# Patient Record
Sex: FEMALE | Race: WHITE | NOT HISPANIC OR LATINO | Employment: FULL TIME | URBAN - METROPOLITAN AREA
[De-identification: names, ages, dates, MRNs, and addresses within clinical notes are randomized per-mention and may not be internally consistent; named-entity substitution may affect disease eponyms.]

---

## 2023-09-20 ENCOUNTER — OFFICE VISIT (OUTPATIENT)
Dept: PRIMARY CARE | Facility: CLINIC | Age: 38
End: 2023-09-20
Payer: COMMERCIAL

## 2023-09-20 VITALS
WEIGHT: 113 LBS | TEMPERATURE: 97.8 F | SYSTOLIC BLOOD PRESSURE: 129 MMHG | HEART RATE: 91 BPM | HEIGHT: 60 IN | DIASTOLIC BLOOD PRESSURE: 79 MMHG | BODY MASS INDEX: 22.19 KG/M2 | OXYGEN SATURATION: 97 %

## 2023-09-20 DIAGNOSIS — F41.9 ANXIETY: ICD-10-CM

## 2023-09-20 DIAGNOSIS — M25.551 PAIN OF RIGHT HIP: ICD-10-CM

## 2023-09-20 DIAGNOSIS — R61 NIGHT SWEATS: ICD-10-CM

## 2023-09-20 DIAGNOSIS — Z00.00 HEALTHCARE MAINTENANCE: Primary | ICD-10-CM

## 2023-09-20 PROBLEM — M81.8 OSTEOPOROSIS, IDIOPATHIC: Status: ACTIVE | Noted: 2023-09-20

## 2023-09-20 PROBLEM — R79.89 ABNORMAL LH LEVEL: Status: RESOLVED | Noted: 2023-09-20 | Resolved: 2023-09-20

## 2023-09-20 PROBLEM — M62.40 MUSCLE CONTRACTURE: Status: RESOLVED | Noted: 2023-09-20 | Resolved: 2023-09-20

## 2023-09-20 PROBLEM — M84.352A: Status: RESOLVED | Noted: 2023-09-20 | Resolved: 2023-09-20

## 2023-09-20 PROBLEM — R79.89 ABNORMAL FSH LEVEL: Status: RESOLVED | Noted: 2023-09-20 | Resolved: 2023-09-20

## 2023-09-20 PROCEDURE — 99395 PREV VISIT EST AGE 18-39: CPT | Performed by: STUDENT IN AN ORGANIZED HEALTH CARE EDUCATION/TRAINING PROGRAM

## 2023-09-20 PROCEDURE — 90471 IMMUNIZATION ADMIN: CPT | Performed by: STUDENT IN AN ORGANIZED HEALTH CARE EDUCATION/TRAINING PROGRAM

## 2023-09-20 PROCEDURE — 90686 IIV4 VACC NO PRSV 0.5 ML IM: CPT | Performed by: STUDENT IN AN ORGANIZED HEALTH CARE EDUCATION/TRAINING PROGRAM

## 2023-09-20 PROCEDURE — 1036F TOBACCO NON-USER: CPT | Performed by: STUDENT IN AN ORGANIZED HEALTH CARE EDUCATION/TRAINING PROGRAM

## 2023-09-20 RX ORDER — PSYLLIUM HUSK 0.4 G
CAPSULE ORAL
COMMUNITY

## 2023-09-20 RX ORDER — CHOLECALCIFEROL (VITAMIN D3) 50 MCG
1 TABLET ORAL DAILY
COMMUNITY
Start: 2022-08-02

## 2023-09-20 RX ORDER — ABALOPARATIDE 2000 UG/ML
INJECTION, SOLUTION SUBCUTANEOUS
COMMUNITY
Start: 2022-06-24 | End: 2023-11-27

## 2023-09-20 NOTE — PROGRESS NOTES
Subjective   Patient ID: Maribel Amador is a 37 y.o. female who presents for No chief complaint on file..    HPI   PMhx CVA (some residual R sided weakness) presenting for CPE.     Re: Osteoporosis - see rheum notes. On Tymlos now for her history of fractures. Doing well on this medication. Having some pain in her surgically repaired R hip. More stiff than usual. No trauma recently in this area, but she wants to make sure the hardware is OK because despite coservative mgmt (PT, NSAIDs PRN) she's still having discomfort in the area.    Re: Anxiety / Night sweats - not sleeping very well lately. Having some sweats and a bit of anxiety that keeps her up and prevents her from sleeping well. She wants to go to a holistic approach and would prefer to avoid SSRI/SNRI or MAOi to help. Amenable to [facility] referral.    Re: HM - due for flu shot. Lab work due. CRS and Mammogram not yet due.     Review of Systems    Objective   /79   Pulse 91   Temp 36.6 °C (97.8 °F)   Ht 1.524 m (5')   Wt 51.3 kg (113 lb)   SpO2 97%   BMI 22.07 kg/m²     Physical Exam  Gen: well developed in NAD. AAO x3.  HEENT: NC/AT. Anicteric sclera, symmetric pupils. MMM no thrush.  Neck: Soft, supple. No LAD. No goiter.   CV: RRR nl s1s2 no m/r/g  Pulm: CTAB no w/r/r, good air exchange  GI: soft NTND BS+ no hsm  Ext: WWP no edema  Neuro: II-XII grossly intact, brisker reflexes R side compared to L.   MSK: 5/5 LLE. 4/5 RLE, no tenderness of hip on palpation. Well healed surgical scars bilaterally  Gait: normal    Lab Results   Component Value Date    WBC 6.3 05/16/2022    HGB 14.2 05/16/2022    HCT 44.4 05/16/2022     05/16/2022    CHOL 156 05/16/2022    TRIG 72 05/16/2022    HDL 65.0 05/16/2022    ALT 14 08/25/2022    AST 16 08/25/2022     12/31/2022    K 4.7 12/31/2022     12/31/2022    CREATININE 0.93 12/31/2022    BUN 17 12/31/2022    CO2 26 12/31/2022    TSH 1.12 05/16/2022     par    XR PELVIS 1-2 VIEWS  Narrative:  Interpreted By:  TANK STAFFORD MD  MRN: [id number]  Patient Name: ANNA AGUILA     STUDY:  Pelvis, single AP view.  Bilateral femora, two views each.     INDICATION:  standing  M76.899: Hip flexor tendinitis; Stress fracture of neck of  left femur; Stress fracture of neck of right femur  M84.351A: Stress  fracture of neck of right femur M84.352A: Stress fracture of neck of  left femur.     COMPARISON:  07/06/2022.     ACCESSION NUMBER(S):  [id number]; [id number]     ORDERING CLINICIAN:  SAMI CLAIRE     FINDINGS:  No acute fracture or malalignment.  Intramedullary nail fixation changes are noted of the bilateral  femora for atypical fractures.  Hardware is intact without perihardware fractures or lucencies.  Bilateral hip and knee joints are unremarkable with well preserved  joint spaces.  Soft tissues are within normal limits.     Impression: Intramedullary nail fixation of bilateral femora without hardware  complication.     XR FEMUR 2 VW BILATERAL  Narrative: Interpreted By:  TANK STAFFORD MD  MRN: [id number]  Patient Name: ANNA AGUILA     STUDY:  Pelvis, single AP view.  Bilateral femora, two views each.     INDICATION:  standing  M76.899: Hip flexor tendinitis; Stress fracture of neck of  left femur; Stress fracture of neck of right femur  M84.351A: Stress  fracture of neck of right femur M84.352A: Stress fracture of neck of  left femur.     COMPARISON:  07/06/2022.     ACCESSION NUMBER(S):  [id number]; [id number]     ORDERING CLINICIAN:  SAMI CLAIRE     FINDINGS:  No acute fracture or malalignment.  Intramedullary nail fixation changes are noted of the bilateral  femora for atypical fractures.  Hardware is intact without perihardware fractures or lucencies.  Bilateral hip and knee joints are unremarkable with well preserved  joint spaces.  Soft tissues are within normal limits.     Impression: Intramedullary nail fixation of bilateral femora without hardware  complication.            Assessment/Plan    # h/o CVA: monitor     # Osteoporosis  - continue Tymlos  - follow up rheum    # Hip pain  - x-rays reasonable given her hardware and increased pain despite conservative tx    # Anxiety, Sleep issues  - [facility] referral      # Health Maintenance  - routine blood work  - Colon Cancer Screening: Not yet indicated   - Mammogram: Not yet indicated   - DEXA: current  - Immunizations: flu shot

## 2023-09-20 NOTE — PATIENT INSTRUCTIONS
Please stop at the lab ([address]) to complete your blood and/or urine work that I've ordered for you.    I will contact you with the results at my soonest convenience. I strongly urge you to use [employer] as this is the quickest and easiest way to access your results and recieve my correspondences.     Stop by the radiology department on the first floor of the building for your x-rays    I have referred you to the [facility] for your Wellness referral. Please call them at [phone] to schedule your appointment.     You received your flu shot today!     Further recommendations will be made based on test results and how you fare clinically.

## 2023-09-27 ENCOUNTER — LAB (OUTPATIENT)
Dept: LAB | Facility: LAB | Age: 38
End: 2023-09-27
Payer: COMMERCIAL

## 2023-09-27 DIAGNOSIS — Z00.00 HEALTHCARE MAINTENANCE: ICD-10-CM

## 2023-09-27 LAB
ALANINE AMINOTRANSFERASE (SGPT) (U/L) IN SER/PLAS: 14 U/L (ref 7–45)
ALBUMIN (G/DL) IN SER/PLAS: 4.5 G/DL (ref 3.4–5)
ALKALINE PHOSPHATASE (U/L) IN SER/PLAS: 77 U/L (ref 33–110)
ANION GAP IN SER/PLAS: 17 MMOL/L (ref 10–20)
ASPARTATE AMINOTRANSFERASE (SGOT) (U/L) IN SER/PLAS: 16 U/L (ref 9–39)
BASOPHILS (10*3/UL) IN BLOOD BY AUTOMATED COUNT: 0.02 X10E9/L (ref 0–0.1)
BASOPHILS/100 LEUKOCYTES IN BLOOD BY AUTOMATED COUNT: 0.3 % (ref 0–2)
BILIRUBIN TOTAL (MG/DL) IN SER/PLAS: 0.6 MG/DL (ref 0–1.2)
CALCIDIOL (25 OH VITAMIN D3) (NG/ML) IN SER/PLAS: 35 NG/ML
CALCIUM (MG/DL) IN SER/PLAS: 10.2 MG/DL (ref 8.6–10.6)
CARBON DIOXIDE, TOTAL (MMOL/L) IN SER/PLAS: 25 MMOL/L (ref 21–32)
CHLORIDE (MMOL/L) IN SER/PLAS: 102 MMOL/L (ref 98–107)
CHOLESTEROL (MG/DL) IN SER/PLAS: 200 MG/DL (ref 0–199)
CHOLESTEROL IN HDL (MG/DL) IN SER/PLAS: 60.4 MG/DL
CHOLESTEROL/HDL RATIO: 3.3
CREATININE (MG/DL) IN SER/PLAS: 0.79 MG/DL (ref 0.5–1.05)
EOSINOPHILS (10*3/UL) IN BLOOD BY AUTOMATED COUNT: 0.2 X10E9/L (ref 0–0.7)
EOSINOPHILS/100 LEUKOCYTES IN BLOOD BY AUTOMATED COUNT: 2.8 % (ref 0–6)
ERYTHROCYTE DISTRIBUTION WIDTH (RATIO) BY AUTOMATED COUNT: 12.4 % (ref 11.5–14.5)
ERYTHROCYTE MEAN CORPUSCULAR HEMOGLOBIN CONCENTRATION (G/DL) BY AUTOMATED: 32.3 G/DL (ref 32–36)
ERYTHROCYTE MEAN CORPUSCULAR VOLUME (FL) BY AUTOMATED COUNT: 91 FL (ref 80–100)
ERYTHROCYTES (10*6/UL) IN BLOOD BY AUTOMATED COUNT: 5.19 X10E12/L (ref 4–5.2)
GFR FEMALE: >90 ML/MIN/1.73M2
GLUCOSE (MG/DL) IN SER/PLAS: 86 MG/DL (ref 74–99)
HEMATOCRIT (%) IN BLOOD BY AUTOMATED COUNT: 47.4 % (ref 36–46)
HEMOGLOBIN (G/DL) IN BLOOD: 15.3 G/DL (ref 12–16)
IMMATURE GRANULOCYTES/100 LEUKOCYTES IN BLOOD BY AUTOMATED COUNT: 0.3 % (ref 0–0.9)
LDL: 114 MG/DL (ref 0–99)
LEUKOCYTES (10*3/UL) IN BLOOD BY AUTOMATED COUNT: 7.1 X10E9/L (ref 4.4–11.3)
LYMPHOCYTES (10*3/UL) IN BLOOD BY AUTOMATED COUNT: 1.98 X10E9/L (ref 1.2–4.8)
LYMPHOCYTES/100 LEUKOCYTES IN BLOOD BY AUTOMATED COUNT: 28 % (ref 13–44)
MONOCYTES (10*3/UL) IN BLOOD BY AUTOMATED COUNT: 0.4 X10E9/L (ref 0.1–1)
MONOCYTES/100 LEUKOCYTES IN BLOOD BY AUTOMATED COUNT: 5.7 % (ref 2–10)
NEUTROPHILS (10*3/UL) IN BLOOD BY AUTOMATED COUNT: 4.44 X10E9/L (ref 1.2–7.7)
NEUTROPHILS/100 LEUKOCYTES IN BLOOD BY AUTOMATED COUNT: 62.9 % (ref 40–80)
NRBC (PER 100 WBCS) BY AUTOMATED COUNT: 0 /100 WBC (ref 0–0)
PLATELETS (10*3/UL) IN BLOOD AUTOMATED COUNT: 270 X10E9/L (ref 150–450)
POTASSIUM (MMOL/L) IN SER/PLAS: 4.1 MMOL/L (ref 3.5–5.3)
PROTEIN TOTAL: 7.5 G/DL (ref 6.4–8.2)
SODIUM (MMOL/L) IN SER/PLAS: 140 MMOL/L (ref 136–145)
TRIGLYCERIDE (MG/DL) IN SER/PLAS: 127 MG/DL (ref 0–149)
UREA NITROGEN (MG/DL) IN SER/PLAS: 15 MG/DL (ref 6–23)
VLDL: 25 MG/DL (ref 0–40)

## 2023-09-27 PROCEDURE — 36415 COLL VENOUS BLD VENIPUNCTURE: CPT

## 2023-09-27 PROCEDURE — 82306 VITAMIN D 25 HYDROXY: CPT

## 2023-09-27 PROCEDURE — 80061 LIPID PANEL: CPT

## 2023-09-27 PROCEDURE — 85025 COMPLETE CBC W/AUTO DIFF WBC: CPT

## 2023-09-27 PROCEDURE — 80053 COMPREHEN METABOLIC PANEL: CPT

## 2023-10-03 ENCOUNTER — SPECIALTY PHARMACY (OUTPATIENT)
Dept: PHARMACY | Facility: CLINIC | Age: 38
End: 2023-10-03

## 2023-10-13 ENCOUNTER — HOSPITAL ENCOUNTER (OUTPATIENT)
Dept: CARDIOLOGY | Facility: HOSPITAL | Age: 38
Discharge: HOME | End: 2023-10-13
Payer: COMMERCIAL

## 2023-10-13 DIAGNOSIS — D68.59 OTHER PRIMARY THROMBOPHILIA (MULTI): ICD-10-CM

## 2023-10-13 DIAGNOSIS — Z01.89 ENCOUNTER FOR OTHER SPECIFIED SPECIAL EXAMINATIONS: ICD-10-CM

## 2023-10-13 LAB — EJECTION FRACTION APICAL 4 CHAMBER: 58.2

## 2023-10-13 PROCEDURE — 93306 TTE W/DOPPLER COMPLETE: CPT

## 2023-10-13 PROCEDURE — 93306 TTE W/DOPPLER COMPLETE: CPT | Performed by: INTERNAL MEDICINE

## 2023-10-20 ENCOUNTER — SPECIALTY PHARMACY (OUTPATIENT)
Dept: PHARMACY | Facility: CLINIC | Age: 38
End: 2023-10-20

## 2023-10-20 DIAGNOSIS — M80.00XD OSTEOPOROSIS WITH CURRENT PATHOLOGICAL FRACTURE WITH ROUTINE HEALING, UNSPECIFIED OSTEOPOROSIS TYPE, SUBSEQUENT ENCOUNTER: Primary | ICD-10-CM

## 2023-10-23 RX ORDER — ABALOPARATIDE 2000 UG/ML
INJECTION, SOLUTION SUBCUTANEOUS
Qty: 1.56 ML | Refills: 0 | Status: SHIPPED | OUTPATIENT
Start: 2023-10-23 | End: 2023-10-30 | Stop reason: SDUPTHER

## 2023-10-25 ENCOUNTER — PHARMACY VISIT (OUTPATIENT)
Dept: PHARMACY | Facility: CLINIC | Age: 38
End: 2023-10-25
Payer: COMMERCIAL

## 2023-10-25 PROCEDURE — RXMED WILLOW AMBULATORY MEDICATION CHARGE

## 2023-10-30 ENCOUNTER — PHARMACY VISIT (OUTPATIENT)
Dept: PHARMACY | Facility: CLINIC | Age: 38
End: 2023-10-30
Payer: COMMERCIAL

## 2023-10-30 ENCOUNTER — SPECIALTY PHARMACY (OUTPATIENT)
Dept: PHARMACY | Facility: CLINIC | Age: 38
End: 2023-10-30

## 2023-10-30 DIAGNOSIS — M80.00XD OSTEOPOROSIS WITH CURRENT PATHOLOGICAL FRACTURE WITH ROUTINE HEALING, UNSPECIFIED OSTEOPOROSIS TYPE, SUBSEQUENT ENCOUNTER: ICD-10-CM

## 2023-10-30 PROCEDURE — RXMED WILLOW AMBULATORY MEDICATION CHARGE

## 2023-10-30 RX ORDER — ABALOPARATIDE 2000 UG/ML
INJECTION, SOLUTION SUBCUTANEOUS
Qty: 1.56 ML | Refills: 0 | Status: CANCELLED | OUTPATIENT
Start: 2023-10-30 | End: 2024-10-29

## 2023-10-30 RX ORDER — ABALOPARATIDE 2000 UG/ML
INJECTION, SOLUTION SUBCUTANEOUS
Qty: 1.56 ML | Refills: 0 | Status: SHIPPED | OUTPATIENT
Start: 2023-10-30 | End: 2023-11-24 | Stop reason: SDUPTHER

## 2023-10-30 NOTE — PROGRESS NOTES
[facility] Clinical Note    Maribel Amador is a 37 y.o. female, who is on the specialty pharmacy service for management of: Osteoporosis Core with status of: (Enrolled)     Maribel was contacted on 10/30/2023.    Refer to the encounter summary report for documentation details about patient counseling and education.      Medication Adherence  The importance of adherence was discussed with the patient and they were advised to take the medication as prescribed by their provider. Maribel was encouraged to call her physician's office if they have a question regarding a missed dose.        Patient advised to contact the pharmacy if there are any changes to her medication list, including prescriptions, OTC medications, herbal products, or supplements. Patient was advised of [facility]’s dispensing process, refill timeline, contact information ([phone]), and patient management follow up. Patient confirmed understanding of education conducted during assessment. All patient questions and concerns were addressed to the best of my ability. Patient was encouraged to contact the specialty pharmacy with any questions or concerns.    Confirmed follow-up outreaches are properly scheduled. Reviewed goals of therapy in the program targets.    Karthik Enriquez, DeclanD

## 2023-11-21 ENCOUNTER — SPECIALTY PHARMACY (OUTPATIENT)
Dept: PHARMACY | Facility: CLINIC | Age: 38
End: 2023-11-21

## 2023-11-21 DIAGNOSIS — M80.00XD OSTEOPOROSIS WITH CURRENT PATHOLOGICAL FRACTURE WITH ROUTINE HEALING, UNSPECIFIED OSTEOPOROSIS TYPE, SUBSEQUENT ENCOUNTER: ICD-10-CM

## 2023-11-21 PROBLEM — I69.90 HISTORY OF CEREBROVASCULAR ACCIDENT WITH RESIDUAL EFFECTS: Status: ACTIVE | Noted: 2023-11-21

## 2023-11-21 PROBLEM — D68.59 PROTEIN C DEFICIENCY (MULTI): Status: ACTIVE | Noted: 2023-11-21

## 2023-11-21 RX ORDER — ABALOPARATIDE 2000 UG/ML
INJECTION, SOLUTION SUBCUTANEOUS
Qty: 1.56 ML | Refills: 0 | Status: CANCELLED | OUTPATIENT
Start: 2023-11-21 | End: 2024-11-20

## 2023-11-24 DIAGNOSIS — M80.00XD OSTEOPOROSIS WITH CURRENT PATHOLOGICAL FRACTURE WITH ROUTINE HEALING, UNSPECIFIED OSTEOPOROSIS TYPE, SUBSEQUENT ENCOUNTER: Primary | ICD-10-CM

## 2023-11-24 RX ORDER — ABALOPARATIDE 2000 UG/ML
INJECTION, SOLUTION SUBCUTANEOUS
Qty: 1.56 ML | Refills: 0 | Status: CANCELLED | OUTPATIENT
Start: 2023-11-21 | End: 2024-11-20

## 2023-11-24 RX ORDER — ABALOPARATIDE 2000 UG/ML
80 INJECTION, SOLUTION SUBCUTANEOUS DAILY
Qty: 1.56 ML | Refills: 5 | Status: SHIPPED
Start: 2023-11-24 | End: 2023-11-27

## 2023-11-27 ENCOUNTER — SPECIALTY PHARMACY (OUTPATIENT)
Dept: PHARMACY | Facility: CLINIC | Age: 38
End: 2023-11-27

## 2023-11-27 ENCOUNTER — PHARMACY VISIT (OUTPATIENT)
Dept: PHARMACY | Facility: CLINIC | Age: 38
End: 2023-11-27
Payer: COMMERCIAL

## 2023-11-27 ENCOUNTER — TELEPHONE (OUTPATIENT)
Dept: RHEUMATOLOGY | Facility: CLINIC | Age: 38
End: 2023-11-27
Payer: COMMERCIAL

## 2023-11-27 DIAGNOSIS — M80.00XD OSTEOPOROSIS WITH CURRENT PATHOLOGICAL FRACTURE WITH ROUTINE HEALING, UNSPECIFIED OSTEOPOROSIS TYPE, SUBSEQUENT ENCOUNTER: ICD-10-CM

## 2023-11-27 PROCEDURE — RXMED WILLOW AMBULATORY MEDICATION CHARGE

## 2023-11-27 RX ORDER — ABALOPARATIDE 2000 UG/ML
80 INJECTION, SOLUTION SUBCUTANEOUS DAILY
Qty: 1.56 ML | Refills: 5 | OUTPATIENT
Start: 2023-11-24 | End: 2023-11-27

## 2023-11-27 RX ORDER — ABALOPARATIDE 2000 UG/ML
80 INJECTION, SOLUTION SUBCUTANEOUS DAILY
Qty: 1.56 ML | Refills: 5 | Status: SHIPPED | OUTPATIENT
Start: 2023-11-27 | End: 2024-05-10 | Stop reason: SDUPTHER

## 2023-12-06 ENCOUNTER — APPOINTMENT (OUTPATIENT)
Dept: HEMATOLOGY/ONCOLOGY | Facility: CLINIC | Age: 38
End: 2023-12-06
Payer: COMMERCIAL

## 2023-12-13 ENCOUNTER — OFFICE VISIT (OUTPATIENT)
Dept: HEMATOLOGY/ONCOLOGY | Facility: CLINIC | Age: 38
End: 2023-12-13
Payer: COMMERCIAL

## 2023-12-13 VITALS
OXYGEN SATURATION: 98 % | TEMPERATURE: 98.8 F | BODY MASS INDEX: 22.65 KG/M2 | HEART RATE: 83 BPM | RESPIRATION RATE: 18 BRPM | SYSTOLIC BLOOD PRESSURE: 114 MMHG | WEIGHT: 115.96 LBS | DIASTOLIC BLOOD PRESSURE: 78 MMHG

## 2023-12-13 DIAGNOSIS — I69.90 HISTORY OF CEREBROVASCULAR ACCIDENT WITH RESIDUAL EFFECTS: Primary | ICD-10-CM

## 2023-12-13 PROCEDURE — 1036F TOBACCO NON-USER: CPT | Performed by: STUDENT IN AN ORGANIZED HEALTH CARE EDUCATION/TRAINING PROGRAM

## 2023-12-13 PROCEDURE — 99215 OFFICE O/P EST HI 40 MIN: CPT | Performed by: STUDENT IN AN ORGANIZED HEALTH CARE EDUCATION/TRAINING PROGRAM

## 2023-12-13 ASSESSMENT — ENCOUNTER SYMPTOMS
OCCASIONAL FEELINGS OF UNSTEADINESS: 0
DEPRESSION: 0
LOSS OF SENSATION IN FEET: 0

## 2023-12-13 ASSESSMENT — PAIN SCALES - GENERAL: PAINLEVEL: 0-NO PAIN

## 2023-12-13 NOTE — PROGRESS NOTES
Patient ID: Maribel Amador is a 38 y.o. female.  Referring Physician: No referring provider defined for this encounter.  Primary Care Provider: Jerry Ramsay MD  Visit Type: Follow Up  Diagnosis: hypercoagulability, h/o stroke as a teenager       Subjective    HPI  38 y.o. female with a PMH significant for CVA with residual right sided numbness and expressive aphasia, protein C deficiency (level ~50%)  who is referred for eval of hypercoag state.   Pt referred by rheumatologist. Had seen  last in 2015, was apparently discharged from hematologist's care.     At age 14yrs, noticed right sided numbness and speech abnormalities but was diagnosed with a CVA event 6 months later. Had not sought care at the time of the actual event. Had ?delayed (timeline unclear as this was more than 20yrs back and pt was a teenager) work up which showed, protein C antigen of 52%, decreased protein C activity at 51%, normal protein S activity, normal blood counts and differential, negative anticardiolipin antibody, factor V Leiden is absent, per testing on file in [employer].     Currently seeing rheumatologist for osteoporosis, and is told that the heparin she received during pregnancy may have caused osteoporosis. Was last pregnant in 2012.     Had a termination of pregnancy 2/2 using an unstudied drug while she was pregnant.   Age appropriate cancer screening: up to date at time of stroke.   FMH: negative for VTE/ATE  Social history: never smoker, no excessive ETOH     Review of Systems - Oncology  10 point review of systems negative except as stated in HPI    Objective     No past surgical history on file.    Oncology History    No history exists.       Physical Exam  Vitals reviewed.   Constitutional:       General: She is not in acute distress.     Appearance: Normal appearance. She is not toxic-appearing.   HENT:      Head: Normocephalic and atraumatic.   Pulmonary:      Effort: Pulmonary effort is normal.  "  Musculoskeletal:      Comments: No pedal edema    Neurological:      General: No focal deficit present.      Mental Status: She is oriented to person, place, and time.   Psychiatric:         Mood and Affect: Mood normal.       BSA: 1.49 meters squared  /78 (BP Location: Left arm, Patient Position: Sitting, BP Cuff Size: Adult)   Pulse 83   Temp 37.1 °C (98.8 °F) (Core)   Resp 18   Wt 52.6 kg (115 lb 15.4 oz)   SpO2 98%   BMI 22.65 kg/m²     Labs:  Lab Results   Component Value Date    WBC 8.5 09/27/2023    NEUTROABS 6.13 09/27/2023    LYMPHSABS 1.83 09/27/2023    MONOSABS 0.42 09/27/2023    EOSABS 0.10 09/27/2023    BASOSABS 0.01 09/27/2023    RBC 5.16 09/27/2023    MCV 89 09/27/2023    MCHC 33.2 09/27/2023    HGB 15.3 09/27/2023    HCT 46.1 (H) 09/27/2023     09/27/2023     No results found for: \"RETICCTPCT\"   Lab Results   Component Value Date    CREATININE 0.79 09/27/2023    BUN 15 09/27/2023     09/27/2023    K 4.1 09/27/2023     09/27/2023    CO2 25 09/27/2023      Lab Results   Component Value Date    ALT 14 09/27/2023    AST 16 09/27/2023    ALKPHOS 77 09/27/2023    BILITOT 0.6 09/27/2023      Lab Results   Component Value Date    TSH 1.12 05/16/2022     Lab Results   Component Value Date    TSH 1.12 05/16/2022     No results found for: \"IRON\", \"TIBC\", \"FERRITIN\"   No results found for: \"NYBSANDQ53\"   No results found for: \"FOLATE\"  Lab Results   Component Value Date    CONRAD NEGATIVE 05/11/2021    RF <10 05/11/2021    SEDRATE 15 05/11/2021      Lab Results   Component Value Date    CRP <0.10 05/11/2021      No results found for: \"FLORIN\"  No results found for: \"LDH\"  No results found for: \"HAPTOGLOBIN\"  Lab Results   Component Value Date    SPEP NORMAL 06/23/2022     No results found for: \"IGG\", \"IGM\", \"IGA\"     No components found for: \"PT\"  aPTT   Date/Time Value Ref Range Status   09/27/2023 09:59 AM 26 (L) 27 - 38 sec Final     Comment:     Note new reference range as of " 6/20/2023 at 10:00am.       Assessment/Plan    38 y.o. female with a PMH significant for  CVA with residual right sided numbness and expressive aphasia, protein C deficiency (level ~50%) who is referred for eval of hypercoag state.     Had a CVA event with residual numbness right sided and speech deficit. Noted to have decreased protein C activity and Ag years later, had not been prescribed anticoagulation, nor restested, in fact had been discharged from hematology as she was asymptomatic and did not have recurrence of ATE. Has not had a VTE event ever but did have an ATE event as noted above. No pregnancy related issues. But has had FMH/o thrombosis in her mother in childhood. Was on ASA now, which she stopped, not on anything for thrombosis prevention at the present time. History is limited by recollection as there is no primary data on her mother's thrombosis event nor on the pt herself.   Her h/o could be consistent with mild protein C deficiency. Advised to hold off on AC until we complete testing and then discuss final recs. Testing revealed normal protein C/S Ag and activity, APS testing (including LA, aCL and B2GP) negative, PT gene mutation negative, JAK2 negative, AT Ag and activity WNL. Normal LFTs and RFTs, last lipid panel note elevation of LDL. ECHO did not show evidence of PFO nor valvular anomalies.   Her workup does not point to a risk factor for thrombosis. Negative PFO study suggests against a VTE event with clot transmitted to the arterial system. There is also no recent h/o thrombosis nor known diagnosis of thrombophilia in the family. As to the previous decreased protein C levels, falsely low values maybe seen with elevated FVIII (notably pt has a shortened PTT), she is notably APS Ab negative and FVL negative which can also falsely lower protein C levels.      Plan:  - in the current state of testing, AC would not be indicated, however ASA 81mg daily could offer some benefit with her h/o  stroke.   - statin would be indicated as well and her LDL is elevated at last check.   - will clarify with lab about nature of the protein C level activity assay as variability in testing is a possibility. Will get back to pt on this question.   - referral to neuro stroke team, to assess for vascular anomalies.     Carlos Huff MD

## 2023-12-20 ENCOUNTER — PHARMACY VISIT (OUTPATIENT)
Dept: PHARMACY | Facility: CLINIC | Age: 38
End: 2023-12-20
Payer: COMMERCIAL

## 2023-12-20 PROCEDURE — RXMED WILLOW AMBULATORY MEDICATION CHARGE

## 2023-12-28 ENCOUNTER — TELEMEDICINE CLINICAL SUPPORT (OUTPATIENT)
Dept: PHARMACY | Facility: HOSPITAL | Age: 38
End: 2023-12-28
Payer: COMMERCIAL

## 2023-12-28 NOTE — PROGRESS NOTES
[facility] Clinical Note    Maribel Amador is a 38 y.o. female, who is on the specialty pharmacy service for management of: Osteoporosis Core with status of: (Enrolled)     Maribel was contacted on 12/28/2023 at 2:48 PM for a virtual pharmacy visit with encounter number [id number] from the [id number]  [id number]  [facility] [address]  [facility] [address]  Dept: [phone]  Dept Fax: [phone]  Loc: [phone]    Maribel was offered a Telemedicine Video visit or Telephone visit.  Maribel chose a telephone only visit, which was performed.    Maribel is taking: Tymlos 80mcg daily. This patient's care will be continued with the referring prescriber Dr Martins.      General Assessment       Impression/Plan  IMPRESSION/PLAN:  Is patient high risk (potential patients:  pregnancy, geriatric, pediatric)?  no  Is laboratory follow-up needed? no  Is a clinical intervention needed? no  Next reassessment date? 3/28/24  Additional comments:     Refer to the encounter summary report for documentation details about patient counseling and education.      Medication Adherence  The importance of adherence was discussed with the patient and they were advised to take the medication as prescribed by their provider. Maribel was encouraged to call her physician's office if they have a question regarding a missed dose.     Conclusion  Rate your quality of life on scale of 1-10: 10 - Completely satisfied  Rate your satisfaction with  Specialty Pharmacy on scale of 1-10: 9      Patient advised to contact the pharmacy if there are any changes to her medication list, including prescriptions, OTC medications, herbal products, or supplements. Patient was advised of [facility]’s dispensing process, refill timeline, contact information ([phone]), and patient management follow up. Patient confirmed understanding of  education conducted during assessment. All patient questions and concerns were addressed to the best of my ability. Patient was encouraged to contact the specialty pharmacy with any questions or concerns.    Confirmed follow-up outreaches are properly scheduled. Reviewed goals of therapy in the program targets.    Otilio Singh, PharmD

## 2024-01-17 ENCOUNTER — OFFICE VISIT (OUTPATIENT)
Dept: OBSTETRICS AND GYNECOLOGY | Facility: CLINIC | Age: 39
End: 2024-01-17
Payer: COMMERCIAL

## 2024-01-17 ENCOUNTER — LAB (OUTPATIENT)
Dept: LAB | Facility: LAB | Age: 39
End: 2024-01-17
Payer: COMMERCIAL

## 2024-01-17 VITALS
SYSTOLIC BLOOD PRESSURE: 110 MMHG | BODY MASS INDEX: 22.58 KG/M2 | DIASTOLIC BLOOD PRESSURE: 60 MMHG | HEIGHT: 60 IN | WEIGHT: 115 LBS

## 2024-01-17 DIAGNOSIS — Z30.09 STERILIZATION CONSULT: ICD-10-CM

## 2024-01-17 DIAGNOSIS — R23.2 HOT FLASHES: ICD-10-CM

## 2024-01-17 DIAGNOSIS — Z01.419 WELL WOMAN EXAM WITH ROUTINE GYNECOLOGICAL EXAM: Primary | ICD-10-CM

## 2024-01-17 DIAGNOSIS — F41.9 ANXIETY: ICD-10-CM

## 2024-01-17 DIAGNOSIS — M81.8 OSTEOPOROSIS, IDIOPATHIC: ICD-10-CM

## 2024-01-17 PROBLEM — S72.90XA FRACTURE OF FEMUR (MULTI): Status: ACTIVE | Noted: 2024-01-17

## 2024-01-17 PROBLEM — M84.353A STRESS FRACTURE OF NECK OF FEMUR: Status: ACTIVE | Noted: 2023-02-15

## 2024-01-17 LAB
PROGEST SERPL-MCNC: 6 NG/ML
TSH SERPL-ACNC: 0.99 MIU/L (ref 0.44–3.98)

## 2024-01-17 PROCEDURE — RXMED WILLOW AMBULATORY MEDICATION CHARGE

## 2024-01-17 PROCEDURE — 99214 OFFICE O/P EST MOD 30 MIN: CPT | Performed by: OBSTETRICS & GYNECOLOGY

## 2024-01-17 PROCEDURE — 87800 DETECT AGNT MULT DNA DIREC: CPT

## 2024-01-17 PROCEDURE — 84443 ASSAY THYROID STIM HORMONE: CPT

## 2024-01-17 PROCEDURE — 1036F TOBACCO NON-USER: CPT | Performed by: OBSTETRICS & GYNECOLOGY

## 2024-01-17 PROCEDURE — 36415 COLL VENOUS BLD VENIPUNCTURE: CPT

## 2024-01-17 PROCEDURE — 87624 HPV HI-RISK TYP POOLED RSLT: CPT

## 2024-01-17 PROCEDURE — 99395 PREV VISIT EST AGE 18-39: CPT | Performed by: OBSTETRICS & GYNECOLOGY

## 2024-01-17 PROCEDURE — 88175 CYTOPATH C/V AUTO FLUID REDO: CPT

## 2024-01-17 PROCEDURE — 84144 ASSAY OF PROGESTERONE: CPT

## 2024-01-17 RX ORDER — ZINC GLUCONATE 100 MG
100 TABLET ORAL DAILY
COMMUNITY

## 2024-01-17 RX ORDER — HYDROXYZINE HYDROCHLORIDE 10 MG/1
10 TABLET, FILM COATED ORAL EVERY 6 HOURS PRN
Qty: 30 TABLET | Refills: 2 | Status: SHIPPED | OUTPATIENT
Start: 2024-01-17 | End: 2024-04-24

## 2024-01-17 ASSESSMENT — PAIN SCALES - GENERAL: PAINLEVEL: 0-NO PAIN

## 2024-01-17 NOTE — PROGRESS NOTES
Maribel Amador is a 38 y.o. female who is here for a routine exam. PCP = Jerry Ramsay MD    HPI:  She would like to get tubes tied.  She want to do it over the summer.  She is also experiencing a couple of problems. Every single night she is getting night sweats.  Started about at 16 or 17 years old.  Over the last couple of years- almost every single night. She used to have irregular periods when she was a teen, and they have normalized as an adult. Migraines at ovulation and prior to period.     Gyn hx:all normal  Menstrual hx: q28-32, lasting one heavy day and spotting after  Contraception: none  Sexual concerns: condoms  STI: none  Social hx: , x-ray tech  Seatbelt: always  Exercise: spinning, some weight   Sexual, physical, mental abuse:feels safe     Past med hx and past surg hx reviewed and notable for: CVA AT AGE 14 with residual right sided numbness and expressive aphasia.  Dx with osteoporosis 36- rheum, 1st fx of femurs 2021 second was 2022.  Migraine H/A- it happens a day prior to her period. Anxiety    Social History     Socioeconomic History    Marital status:      Spouse name: Not on file    Number of children: Not on file    Years of education: Not on file    Highest education level: Not on file   Occupational History    Not on file   Tobacco Use    Smoking status: Never    Smokeless tobacco: Never   Vaping Use    Vaping Use: Never used   Substance and Sexual Activity    Alcohol use: Never    Drug use: Never    Sexual activity: Yes     Birth control/protection: Condom Male   Other Topics Concern    Not on file   Social History Narrative    Not on file     Social Determinants of Health     Financial Resource Strain: Not on file   Food Insecurity: Not on file   Transportation Needs: Not on file   Physical Activity: Not on file   Stress: Not on file   Social Connections: Not on file   Intimate Partner Violence: Not on file   Housing Stability: Not on file       Objective   /60    Ht 1.524 m (5')   Wt 52.2 kg (115 lb)   LMP 12/27/2023   BMI 22.46 kg/m²      General:   Alert and oriented, in no acute distress   Neck: Supple. No visible thyromegaly.    Breast/Axilla: Normal to palpation bilaterally without masses, skin changes, or nipple discharge.    Abdomen: Soft, non-tender, without masses or organomegaly   Vulva: Normal architecture without erythema, masses, or lesions.    Vagina: Normal mucosa without lesions, masses, or atrophy. No abnormal vaginal discharge.    Cervix: Normal without masses, lesions, or signs of cervicitis.    Uterus: Normal mobile, non-enlarged uterus    Adnexa: Normal without masses or lesions   Pelvic Floor No POP noted. No high tone pelvic floor    Psych Normal affect. Normal mood.        Annual  PAP and HPV today  Mammo at 39 due to mom's breast cancer     -DEXA:  === 05/17/22 === repeating with rheum this year.    - Impression -  The BMD measured at AP Spine L1-L4 is 0.877 g/cm2 with a T-score of  -2.5.  This patient is considered osteoporotic according to World  Health Organization (WHO) criteria.  Pharmacological treatment, if  not already prescribed, should be started.  A follow up bone density  test is recommended in one year to monitor response to therapy.    With a Z-score of -2.3, this patient has very low BMD for their age  and sex.  Causes of secondary bone loss should be investigated.  .  Bone mineral density in the lumbar spine may be falsely elevated  secondary to discogenic degenerative disease and degenerative facet  sclerosis.    Follow-up DEXA and treatment is recommended as clinically indicated.  .  All images and detailed analysis are available on the  Radiology  PACS.*    *For patients with comparison exams obtained from [employer] DEXA prior  to December 2006, measurements presented in this report have been  modified to reflect more accurate trend analysis when compared to  current data obtained on the [employer] DEXA.     Orders Placed This  Encounter   Procedures    BI mammo bilateral screening tomosynthesis     Standing Status:   Future     Standing Expiration Date:   3/17/2025     Order Specific Question:   Perform a breast ultrasound if clinically indicated by Radiologist?     Answer:   Yes     Order Specific Question:   Is the patient pregnant?     Answer:   No     Order Specific Question:   Previous Mamm performed at  location?     Answer:   Yes     Order Specific Question:   Reason for exam:     Answer:   Screening     Order Specific Question:   Radiologist to Determine Optimal Study     Answer:   Yes     Order Specific Question:   Release result to MyChart     Answer:   Immediate [1]     Order Specific Question:   Is this exam part of a Research Study? If Yes, link this order to the research study     Answer:   No    Progesterone     Standing Status:   Future     Number of Occurrences:   1     Standing Expiration Date:   1/17/2025     Order Specific Question:   Release result to MyChart     Answer:   Immediate [1]    TSH with reflex to Free T4 if abnormal     Standing Status:   Future     Number of Occurrences:   1     Standing Expiration Date:   1/17/2025     Order Specific Question:   Release result to MyChart     Answer:   Immediate [1]    Follicle Stimulating Hormone     Standing Status:   Future     Standing Expiration Date:   1/17/2025     Order Specific Question:   Release result to MyChart     Answer:   Immediate [1]    Estrogens, Total     Standing Status:   Future     Standing Expiration Date:   1/17/2025     Order Specific Question:   Release result to MyChart     Answer:   Immediate [1]       Problem List Items Addressed This Visit          Ob-Gyn Problems    Sterilization consult    Overview     Discussed that salpingectomy is the recommended method of sterilization and will schedule in the spring for the summer.         Well woman exam with routine gynecological exam - Primary    Relevant Orders    THINPREP PAP TEST (>30)    BI  mammo bilateral screening tomosynthesis       Other    Anxiety    Overview     Discussed options for anxiety- we discussed SSRI vs taking a PRN medication like hydroxyzine.  Will see back in a month and see if she notices that hydroxyzine is helping and consider referral for either therapy or other options.         Relevant Medications    hydrOXYzine HCL (Atarax) 10 mg tablet    Hot flashes    Overview     Will check progestin and TSH today and will do a Day 3 FSH and Estradiol to see if ovulation is occurring.  With her history of osteoporosis and now with hot flashes need to understand if perimenopausal or menopausal.         Idiopathic osteoporosis        Thank you for coming to your annual exam.

## 2024-01-18 ENCOUNTER — PHARMACY VISIT (OUTPATIENT)
Dept: PHARMACY | Facility: CLINIC | Age: 39
End: 2024-01-18
Payer: COMMERCIAL

## 2024-01-18 LAB
C TRACH RRNA SPEC QL NAA+PROBE: NEGATIVE
N GONORRHOEA DNA SPEC QL PROBE+SIG AMP: NEGATIVE

## 2024-01-19 ENCOUNTER — SPECIALTY PHARMACY (OUTPATIENT)
Dept: PHARMACY | Facility: CLINIC | Age: 39
End: 2024-01-19

## 2024-01-19 ENCOUNTER — PHARMACY VISIT (OUTPATIENT)
Dept: PHARMACY | Facility: CLINIC | Age: 39
End: 2024-01-19
Payer: COMMERCIAL

## 2024-01-19 PROCEDURE — RXMED WILLOW AMBULATORY MEDICATION CHARGE

## 2024-01-24 ENCOUNTER — LAB (OUTPATIENT)
Dept: LAB | Facility: LAB | Age: 39
End: 2024-01-24
Payer: COMMERCIAL

## 2024-01-24 DIAGNOSIS — M81.8 OSTEOPOROSIS, IDIOPATHIC: ICD-10-CM

## 2024-01-24 LAB — FSH SERPL-ACNC: 6.5 IU/L

## 2024-01-24 PROCEDURE — 83001 ASSAY OF GONADOTROPIN (FSH): CPT

## 2024-01-24 PROCEDURE — 82672 ASSAY OF ESTROGEN: CPT

## 2024-01-24 PROCEDURE — 36415 COLL VENOUS BLD VENIPUNCTURE: CPT

## 2024-01-28 LAB — ESTROGEN SERPL-MCNC: 180 PG/ML

## 2024-01-29 LAB
CYTOLOGY CMNT CVX/VAG CYTO-IMP: NORMAL
HPV HR 12 DNA GENITAL QL NAA+PROBE: NEGATIVE
HPV HR GENOTYPES PNL CVX NAA+PROBE: NEGATIVE
HPV16 DNA SPEC QL NAA+PROBE: NEGATIVE
HPV18 DNA SPEC QL NAA+PROBE: NEGATIVE
LAB AP HPV GENOTYPE QUESTION: YES
LAB AP HPV HR: NORMAL
LAB AP PAP ADDITIONAL TESTS: NORMAL
LABORATORY COMMENT REPORT: NORMAL
LMP START DATE: NORMAL
PATH REPORT.TOTAL CANCER: NORMAL

## 2024-02-02 ENCOUNTER — OFFICE VISIT (OUTPATIENT)
Dept: NEUROLOGY | Facility: CLINIC | Age: 39
End: 2024-02-02
Payer: COMMERCIAL

## 2024-02-02 VITALS
RESPIRATION RATE: 18 BRPM | WEIGHT: 118 LBS | DIASTOLIC BLOOD PRESSURE: 70 MMHG | BODY MASS INDEX: 23.05 KG/M2 | SYSTOLIC BLOOD PRESSURE: 117 MMHG | HEART RATE: 71 BPM

## 2024-02-02 DIAGNOSIS — I69.90 HISTORY OF CEREBROVASCULAR ACCIDENT WITH RESIDUAL EFFECTS: Primary | ICD-10-CM

## 2024-02-02 PROCEDURE — 99215 OFFICE O/P EST HI 40 MIN: CPT | Mod: GC | Performed by: PSYCHIATRY & NEUROLOGY

## 2024-02-02 PROCEDURE — 1036F TOBACCO NON-USER: CPT | Performed by: PSYCHIATRY & NEUROLOGY

## 2024-02-02 PROCEDURE — 99205 OFFICE O/P NEW HI 60 MIN: CPT | Performed by: PSYCHIATRY & NEUROLOGY

## 2024-02-02 RX ORDER — ASPIRIN 81 MG/1
81 TABLET ORAL DAILY
Qty: 30 TABLET | Refills: 2 | Status: SHIPPED | OUTPATIENT
Start: 2024-02-02 | End: 2025-02-01

## 2024-02-02 ASSESSMENT — ACTIVITIES OF DAILY LIVING (ADL)
GROOMING: INDEPENDENT FACE/HAIR/TEETH.SHAVING (IMPLEMENTS PROVIDED)
BED_TO_CHAIR_AND_BACK: INDEPENDENT
TOILET_USE: INDEPENDENT (ON AND OFF, DRESSING, WIPING)
FEEDING: INDEPENDENT
TOTAL_SCORE: 100
MOBILITY_LEVEL_SURFACES: INDEPENDENT (BUT MAY USE ANY AID FOR EXAMPLE, STICK) > 50 YARDS
DRESSING: INDEPENDENT (INCLUDING BUTTONS, ZIPS, LACES,ETC.)
STAIRS: INDEPENDENT
BLADDER: CONTINENT
BOWELS: INDEPENDENT (INCLUDING BUTTONS, ZIPS, LACES, ETC.)
BATHING: INDEPENDENT (OR IN SHOWER)

## 2024-02-02 ASSESSMENT — PAIN SCALES - GENERAL: PAINLEVEL: 0-NO PAIN

## 2024-02-02 NOTE — PROGRESS NOTES
"   [facility]   Maribel Amador is a 38 y.o. year old female presenting for neurologic evaluation.   Referred by GONZÁLEZ Larkin MD  PCP Jerry Ramsay MD    2/2/24- Referral for stroke.    2000- at age 14- history of stroke diagnosed evaluating non-acute R sensory and speech deficits. No trauma at the time, varicella, headache or other illnesses. It felt like \"her heart stopped\" when going to sleep at night, then shortly after symptoms started. Had tingling on right arm, trouble with words. No weakness, no problems with vision. It took about 6 months before she went in to the doctor.     Dx with protein C deficiency, treated with lovenox then aspirin, then stopped therapy. Stopped ASA about 10 years ago. Recent hematology evaluation extensive testing negative for a prothrombotic state.     Does remember having an ultrasound of her neck, but does not remember having CTA or MRA. Not on a statin or ASA 81mg. No family history of stroke. Mother had blood clots in her legs when she was young, no known cause.     Extensive review of notes in EMR, labs, tests, Interpretation of neuroimaging  2010 MRI (headache, migraine)- DWI negative.  Remote infarct in anterior LMCA and mesial occipital LPCA territory.  9/2023- TTE EF 60-65%, normal LA negative bubble study.  See 12/2023 note re: extensive coagulation testing, normal/ negative.     , 76 year before    Relevant ROS, Problem list, Past Medical/ Surgical/ Family/ Social history- reviewed and pertinent details noted in history.     Objective     Visit Vitals  LMP 12/27/2023   OB Status Having periods   Smoking Status Never       Vitals:    02/02/24 1224   BP: 117/70   Pulse: 71   Resp: 18        Current Outpatient Medications on File Prior to Visit   Medication Sig Dispense Refill    abaloparatide (Tymlos) 80 mcg (3,120 mcg/1.56 mL) pen injector Inject 80 mg under the skin once daily. 1.56 mL 5    calcium carbonate-vitamin D3 " 1,000 mg-20 mcg (800 unit) tablet Take by mouth.      cholecalciferol (Vitamin D-3) 50 MCG (2000 UT) tablet Take 1 tablet (2,000 Units) by mouth once daily.      hydrOXYzine HCL (Atarax) 10 mg tablet Take 1 tablet (10 mg) by mouth every 6 hours if needed for anxiety. 30 tablet 2    phytonadione, vit K1, (vitamin k) 100 mcg tablet Take 1 tablet (100 mcg) by mouth once daily.      TURMERIC ORAL Take by mouth.       No current facility-administered medications on file prior to visit.      Neuro Scores/ Scales:    Constitutional: General appearance: no acute distress   Auscultation of Heart: Regular rate and rhythm, no murmurs, normal S1 and S2.   Carotid Arteries: Intact without any bruits Normal speech and language   Cranial nerve II: Visual fields full to confrontation.   Cranial nerves III, IV, and VI: Pupils round, equally reactive to light; no ptosis. EOMs intact. No nystagmus.   Cranial Nerve V: Facial sensation intact bilaterally.   Cranial nerve VII: Normal and symmetric facial strength.   Motor: Muscle bulk was normal in both upper and lower extremities. Muscle tone was normal in both upper and lower extremities. Slightly slower thumb to finger dexterity in right hand  Gait: Gait is normal without spasticity, ataxia or bradykinesia. Stance is stable with a negative Romberg.        NIHSS     NIH Stroke Scale: 1    Scores and Scales  Feeding: no difficulty feeding oneself   Dressing: independent with dressing, can use devices   Grooming: independent with grooming   Bathing: independent with bathing alone   Transfers: independent with transfers   Mobility: able to walk > 50 yards independently   Stairs: able to independently manage 1 flight of stairs   Toilet Use: able to toilet independently   Bladder: independent with bladder management    Bowel: independent with bowel management  Barthel Index Score: 100     Assessment/Plan   No diagnosis found.  Cerebrovascular diagnosis: Ischemic Stroke: Cryptogenic   "    PLAN:  38 y.o. year old female presenting for neurologic evaluation s/p CVA with unknown cause. Hematologic workup negative.     Ordered CTA to explore vascular causes  Restart ASA 81mg  Follow up after CTA to review images together    Goals for STROKE PREVENTION- recommendations to reduce the risk of vascular events and promote brain health include monitoring and management of vascular risk factors and lifestyle choices to goal values.     Cardiovascular disease- Goal is monitoring and management of conditions that increase stroke risk.   Antithrombotic \"blood thinner\" medication- Antithrombic Therapy/Blood Thinners : Recommended to prevent a stroke or other vascular event- ASA 81mg  Blood pressure- Normal BP is <120/80 mmHg.  Blood Pressure : At goal, continue current plan  Cholesterol- Ideal lipid profile is an LDL-cholesterol <70 mg/dl, total cholesterol <200 mg/dl, fasting triglycerides < 150 mg/dl and HDL-cholesterol >55 mg/dl. LDL currently 114, previous year 77. Would like to try lifestyle choices before starting statin.  Blood sugar- Blood Sugar : At goal, continue current plan  Healthy physical activity- Goal is a moderate level of exercise at least 30 minutes/day, most days of the week.   Healthy weight- Goal is an ideal weight with a waistline <40\" for men or <35\" for women, and BMI of 18.5-25.   Healthy diet- is rich in vegetables, fruits, whole grains, legumes and fish, low in salt, and avoids red meats and processed/ refined foods.   Healthy sleep- is restorative, ~7 hours/night, with identification and treatment of obstructive/ central sleep apnea that increases the risk of stroke and heart disease.   Smoking- Goal is to stop smoking any tobacco product and avoid second-hand smoke.   Alcohol- Goal is moderation; no more than 2 servings for men and 1 serving for non-pregnant women.   Drug use- Goal is avoidance of illicit drugs that can cause blood pressure spikes and damage to blood vessels. "   Stroke Warning Signs- know the symptoms of stroke and the importance of calling 911/EMS to access the quickest treatment.     I saw and evaluated the patient. I personally obtained the key and critical portions of the history and physical exam or was physically present for key and critical portions performed by the resident/fellow. I reviewed the resident/fellow's documentation and discussed the patient with the resident/fellow. I agree with the resident/fellow's medical decision making as documented in the note with the exception/addition of the following:    RUE mild sensory deficit but also has slowing of John R hand, facial asymmetry but her L NLF is flatter vs R is hypercontracted.     Extensive review of notes in EMR, labs, tests, Interpretation of neuroimaging  MRI in 2010 shows a chronic LMCA anterior division infarct and chronic LPCA mesial occipital infarct.     Assessment/Plan   1. History of cerebrovascular accident with residual effects      PLAN   Extensive search in old EMRs cannot locate the neurological notes from that time and she doesn't recall what specific information was shared with her.  Reviewing the event, awoke with some symptoms, but no clues to etiology- was not ill, febrile, no trauma, migraine or neck pain.  Attributed to protein C deficiency, which she does not have now.   No recurrence > 15yr.     Recommended to restart aspirin while we try to better explain why she had a stroke and what preventive strategy is ideal.  Get CTA to evaluate vasculature.     Radha Perry MD                          No orders of the defined types were placed in this encounter.

## 2024-02-05 PROCEDURE — RXMED WILLOW AMBULATORY MEDICATION CHARGE

## 2024-02-16 ENCOUNTER — HOSPITAL ENCOUNTER (OUTPATIENT)
Dept: RADIOLOGY | Facility: CLINIC | Age: 39
Discharge: HOME | End: 2024-02-16
Payer: COMMERCIAL

## 2024-02-16 ENCOUNTER — SPECIALTY PHARMACY (OUTPATIENT)
Dept: PHARMACY | Facility: CLINIC | Age: 39
End: 2024-02-16

## 2024-02-16 DIAGNOSIS — I69.90 HISTORY OF CEREBROVASCULAR ACCIDENT WITH RESIDUAL EFFECTS: ICD-10-CM

## 2024-02-16 PROCEDURE — 2550000001 HC RX 255 CONTRASTS

## 2024-02-16 PROCEDURE — 70498 CT ANGIOGRAPHY NECK: CPT | Performed by: RADIOLOGY

## 2024-02-16 PROCEDURE — 70498 CT ANGIOGRAPHY NECK: CPT

## 2024-02-16 PROCEDURE — 70496 CT ANGIOGRAPHY HEAD: CPT | Performed by: RADIOLOGY

## 2024-02-16 RX ADMIN — IOHEXOL 75 ML: 350 INJECTION, SOLUTION INTRAVENOUS at 13:07

## 2024-02-19 ENCOUNTER — SPECIALTY PHARMACY (OUTPATIENT)
Dept: PHARMACY | Facility: CLINIC | Age: 39
End: 2024-02-19

## 2024-02-19 PROCEDURE — RXMED WILLOW AMBULATORY MEDICATION CHARGE

## 2024-02-20 ENCOUNTER — PHARMACY VISIT (OUTPATIENT)
Dept: PHARMACY | Facility: CLINIC | Age: 39
End: 2024-02-20
Payer: COMMERCIAL

## 2024-03-04 ENCOUNTER — APPOINTMENT (OUTPATIENT)
Dept: NEUROLOGY | Facility: CLINIC | Age: 39
End: 2024-03-04
Payer: COMMERCIAL

## 2024-03-18 PROCEDURE — RXMED WILLOW AMBULATORY MEDICATION CHARGE

## 2024-03-22 ENCOUNTER — SPECIALTY PHARMACY (OUTPATIENT)
Dept: PHARMACY | Facility: CLINIC | Age: 39
End: 2024-03-22

## 2024-03-26 ENCOUNTER — PHARMACY VISIT (OUTPATIENT)
Dept: PHARMACY | Facility: CLINIC | Age: 39
End: 2024-03-26
Payer: COMMERCIAL

## 2024-04-02 ENCOUNTER — SPECIALTY PHARMACY (OUTPATIENT)
Dept: PHARMACY | Facility: CLINIC | Age: 39
End: 2024-04-02

## 2024-04-22 ENCOUNTER — OFFICE VISIT (OUTPATIENT)
Dept: NEUROLOGY | Facility: CLINIC | Age: 39
End: 2024-04-22
Payer: COMMERCIAL

## 2024-04-22 VITALS
SYSTOLIC BLOOD PRESSURE: 104 MMHG | WEIGHT: 117 LBS | BODY MASS INDEX: 22.85 KG/M2 | RESPIRATION RATE: 18 BRPM | DIASTOLIC BLOOD PRESSURE: 69 MMHG | HEART RATE: 64 BPM

## 2024-04-22 DIAGNOSIS — I69.90 HISTORY OF CEREBROVASCULAR ACCIDENT WITH RESIDUAL EFFECTS: Primary | ICD-10-CM

## 2024-04-22 PROCEDURE — RXMED WILLOW AMBULATORY MEDICATION CHARGE

## 2024-04-22 PROCEDURE — 99212 OFFICE O/P EST SF 10 MIN: CPT | Performed by: PSYCHIATRY & NEUROLOGY

## 2024-04-22 PROCEDURE — 1036F TOBACCO NON-USER: CPT | Performed by: PSYCHIATRY & NEUROLOGY

## 2024-04-22 PROCEDURE — 99212 OFFICE O/P EST SF 10 MIN: CPT | Mod: GC | Performed by: PSYCHIATRY & NEUROLOGY

## 2024-04-22 ASSESSMENT — ACTIVITIES OF DAILY LIVING (ADL)
BLADDER: CONTINENT
DRESSING: INDEPENDENT (INCLUDING BUTTONS, ZIPS, LACES,ETC.)
STAIRS: INDEPENDENT
FEEDING: INDEPENDENT
GROOMING: INDEPENDENT FACE/HAIR/TEETH.SHAVING (IMPLEMENTS PROVIDED)
BOWELS: INDEPENDENT (INCLUDING BUTTONS, ZIPS, LACES, ETC.)
BATHING: INDEPENDENT (OR IN SHOWER)
BED_TO_CHAIR_AND_BACK: INDEPENDENT
TOILET_USE: INDEPENDENT (ON AND OFF, DRESSING, WIPING)
TOTAL_SCORE: 100
MOBILITY_LEVEL_SURFACES: INDEPENDENT (BUT MAY USE ANY AID FOR EXAMPLE, STICK) > 50 YARDS

## 2024-04-22 ASSESSMENT — PAIN SCALES - GENERAL: PAINLEVEL: 0-NO PAIN

## 2024-04-22 NOTE — PROGRESS NOTES
"   [facility]   Maribel Amador is a 38 y.o. year old female presenting for neurologic evaluation.   Referred by GONZÁLEZ Larkin MD  PCP Jerry Ramsay MD    2/2/24- Referral for stroke.    2000- at age 14- history of stroke diagnosed evaluating non-acute R sensory and speech deficits. No trauma at the time, varicella, headache or other illnesses. It felt like \"her heart stopped\" when going to sleep at night, then shortly after symptoms started. Had tingling on right arm, trouble with words. No weakness, no problems with vision. It took about 6 months before she went in to the doctor.     Dx with protein C deficiency, treated with lovenox then aspirin, then stopped therapy. Stopped ASA about 10 years ago. Recent hematology evaluation extensive testing negative for a prothrombotic state.     Does remember having an ultrasound of her neck, but does not remember having CTA or MRA. Not on a statin or ASA 81mg. No family history of stroke. Mother had blood clots in her legs when she was young, no known cause.     4/22/2024: rCTA was done 2/16/2024 and was unremarkable. Discussed imaging with patient and at this point the etiology of stroke is cryptogenic.     Extensive review of notes in EMR, labs, tests, Interpretation of neuroimaging  2010 MRI (headache, migraine)- DWI negative.  Remote infarct in anterior LMCA and mesial occipital LPCA territory.  9/2023- TTE EF 60-65%, normal LA negative bubble study.  See 12/2023 note re: extensive coagulation testing, normal/ negative.     , 76 year before    Relevant ROS, Problem list, Past Medical/ Surgical/ Family/ Social history- reviewed and pertinent details noted in history.     Objective     Visit Vitals  OB Status Having periods   Smoking Status Never       There were no vitals filed for this visit.       Current Outpatient Medications on File Prior to Visit   Medication Sig Dispense Refill    abaloparatide (Tymlos) 80 mcg (3,120 " mcg/1.56 mL) pen injector Inject 80 mg under the skin once daily. 1.56 mL 5    aspirin 81 mg EC tablet Take 1 tablet (81 mg) by mouth once daily. 30 tablet 2    calcium carbonate-vitamin D3 1,000 mg-20 mcg (800 unit) tablet Take by mouth.      cholecalciferol (Vitamin D-3) 50 MCG (2000 UT) tablet Take 1 tablet (2,000 Units) by mouth once daily.      hydrOXYzine HCL (Atarax) 10 mg tablet Take 1 tablet (10 mg) by mouth every 6 hours if needed for anxiety. 30 tablet 2    phytonadione, vit K1, (vitamin k) 100 mcg tablet Take 1 tablet (100 mcg) by mouth once daily.      TURMERIC ORAL Take by mouth.       No current facility-administered medications on file prior to visit.      Neuro Scores/ Scales:    Constitutional: General appearance: no acute distress   Auscultation of Heart: Regular rate and rhythm, no murmurs, normal S1 and S2.   Carotid Arteries: Intact without any bruits Normal speech and language   Cranial nerve II: Visual fields full to confrontation.   Cranial nerves III, IV, and VI: Pupils round, equally reactive to light; no ptosis. EOMs intact. No nystagmus.   Cranial Nerve V: Facial sensation intact bilaterally.   Cranial nerve VII: Normal and symmetric facial strength.   Motor: Muscle bulk was normal in both upper and lower extremities. Muscle tone was normal in both upper and lower extremities. Slightly slower thumb to finger dexterity in right hand  Gait: Gait is normal without spasticity, ataxia or bradykinesia. Stance is stable with a negative Romberg.        NIHSS          Scores and Scales  Feeding: no difficulty feeding oneself   Dressing: independent with dressing, can use devices   Grooming: independent with grooming   Bathing: independent with bathing alone   Transfers: independent with transfers   Mobility: able to walk > 50 yards independently   Stairs: able to independently manage 1 flight of stairs   Toilet Use: able to toilet independently   Bladder: independent with bladder management   "  Bowel: independent with bowel management  Barthel Index Score: 100     Assessment/Plan   No diagnosis found.  Cerebrovascular diagnosis: Ischemic Stroke: Cryptogenic      PLAN:  38 y.o. year old female presenting for neurologic evaluation s/p CVA with unknown cause. Current hematologic workup negative.     Unrevealing stroke evaluation; at least at this point no underlying pathology found to think patient is at risk of further strokes   Continue ASA 81mg for secondary stroke prevention   No need for regular follow up, follow up as needed     Patient seen, evaluated and discussed with attending physician, Dr. Perry.    Tim Fajardo PGY5  Vascular Neurology Fellow     Goals for STROKE PREVENTION- recommendations to reduce the risk of vascular events and promote brain health include monitoring and management of vascular risk factors and lifestyle choices to goal values.     Cardiovascular disease- Goal is monitoring and management of conditions that increase stroke risk.   Antithrombotic \"blood thinner\" medication- Antithrombic Therapy/Blood Thinners : Recommended to prevent a stroke or other vascular event- ASA 81mg  Blood pressure- Normal BP is <120/80 mmHg.  Blood Pressure : At goal, continue current plan  Cholesterol- Ideal lipid profile is an LDL-cholesterol <70 mg/dl, total cholesterol <200 mg/dl, fasting triglycerides < 150 mg/dl and HDL-cholesterol >55 mg/dl. LDL currently 114, previous year 77. Would like to try lifestyle choices before starting statin.  Blood sugar- Blood Sugar : At goal, continue current plan  Healthy physical activity- Goal is a moderate level of exercise at least 30 minutes/day, most days of the week.   Healthy weight- Goal is an ideal weight with a waistline <40\" for men or <35\" for women, and BMI of 18.5-25.   Healthy diet- is rich in vegetables, fruits, whole grains, legumes and fish, low in salt, and avoids red meats and processed/ refined foods.   Healthy sleep- is restorative, ~7 " hours/night, with identification and treatment of obstructive/ central sleep apnea that increases the risk of stroke and heart disease.   Smoking- Goal is to stop smoking any tobacco product and avoid second-hand smoke.   Alcohol- Goal is moderation; no more than 2 servings for men and 1 serving for non-pregnant women.   Drug use- Goal is avoidance of illicit drugs that can cause blood pressure spikes and damage to blood vessels.   Stroke Warning Signs- know the symptoms of stroke and the importance of calling 911/EMS to access the quickest treatment.     I saw and evaluated the patient. I personally obtained the key and critical portions of the history and physical exam or was physically present for key and critical portions performed by the resident/fellow. I reviewed the resident/fellow's documentation and discussed the patient with the resident/fellow. I agree with the resident/fellow's medical decision making as documented in the note with the exception/addition of the following:    RUE mild sensory deficit but also has slowing of John R hand, facial asymmetry but her L NLF is flatter vs R is hypercontracted.     Extensive review of notes in EMR, labs, tests, Interpretation of neuroimaging  MRI in 2010 shows a chronic LMCA anterior division infarct and chronic LPCA mesial occipital infarct.     Assessment/Plan   No diagnosis found.    PLAN   Extensive search in old EMRs cannot locate the neurological notes from that time and she doesn't recall what specific information was shared with her.  Reviewing the event, awoke with some symptoms, but no clues to etiology- was not ill, febrile, no trauma, migraine or neck pain.  Attributed to protein C deficiency, which she does not have now.   No recurrence > 15yr. rCTA unremarkable.       No orders of the defined types were placed in this encounter.

## 2024-04-24 ENCOUNTER — PREP FOR PROCEDURE (OUTPATIENT)
Dept: OBSTETRICS AND GYNECOLOGY | Facility: CLINIC | Age: 39
End: 2024-04-24

## 2024-04-24 ENCOUNTER — OFFICE VISIT (OUTPATIENT)
Dept: OBSTETRICS AND GYNECOLOGY | Facility: CLINIC | Age: 39
End: 2024-04-24
Payer: COMMERCIAL

## 2024-04-24 ENCOUNTER — APPOINTMENT (OUTPATIENT)
Dept: OBSTETRICS AND GYNECOLOGY | Facility: CLINIC | Age: 39
End: 2024-04-24
Payer: COMMERCIAL

## 2024-04-24 ENCOUNTER — PHARMACY VISIT (OUTPATIENT)
Dept: PHARMACY | Facility: CLINIC | Age: 39
End: 2024-04-24
Payer: COMMERCIAL

## 2024-04-24 VITALS
HEIGHT: 60 IN | BODY MASS INDEX: 22.38 KG/M2 | WEIGHT: 114 LBS | SYSTOLIC BLOOD PRESSURE: 122 MMHG | DIASTOLIC BLOOD PRESSURE: 72 MMHG

## 2024-04-24 DIAGNOSIS — Z30.09 STERILIZATION CONSULT: Primary | ICD-10-CM

## 2024-04-24 DIAGNOSIS — G47.9 SLEEP DISTURBANCE: ICD-10-CM

## 2024-04-24 DIAGNOSIS — Z30.2 ENCOUNTER FOR FEMALE STERILIZATION PROCEDURE: Primary | ICD-10-CM

## 2024-04-24 DIAGNOSIS — F41.9 ANXIETY: ICD-10-CM

## 2024-04-24 PROCEDURE — 99214 OFFICE O/P EST MOD 30 MIN: CPT | Performed by: OBSTETRICS & GYNECOLOGY

## 2024-04-24 PROCEDURE — 1036F TOBACCO NON-USER: CPT | Performed by: OBSTETRICS & GYNECOLOGY

## 2024-04-24 RX ORDER — CELECOXIB 400 MG/1
400 CAPSULE ORAL ONCE
OUTPATIENT
Start: 2024-04-24 | End: 2024-04-24

## 2024-04-24 RX ORDER — GABAPENTIN 600 MG/1
600 TABLET ORAL ONCE
OUTPATIENT
Start: 2024-04-24 | End: 2024-04-24

## 2024-04-24 RX ORDER — ACETAMINOPHEN 325 MG/1
975 TABLET ORAL ONCE
OUTPATIENT
Start: 2024-04-24 | End: 2024-04-24

## 2024-04-24 ASSESSMENT — PAIN SCALES - GENERAL: PAINLEVEL: 0-NO PAIN
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
## 2024-04-29 ENCOUNTER — TELEPHONE (OUTPATIENT)
Dept: OBSTETRICS AND GYNECOLOGY | Facility: CLINIC | Age: 39
End: 2024-04-29
Payer: COMMERCIAL

## 2024-04-29 NOTE — TELEPHONE ENCOUNTER
----- Message from Davida Coley sent at 4/29/2024  9:22 AM EDT -----  Regarding: CALLUM Winn has a procedure scheduled for [facility] 8-28-24 with attached pt  CALLUM Winn has a procedure scheduled for [facility] 8-28-24 with attached pt    DR Winn, IF this patient requires PAT or Labs you can order them at this point. Thank you, Davida    4/29/24 1535 BTL is scheduled 8/28/24 @ [facility]. Labs to be done 8/26/24 AM. Will call pt to review pre-op instructions.     4/30/24 [id number] [facility].

## 2024-05-10 ENCOUNTER — TELEPHONE (OUTPATIENT)
Dept: OBSTETRICS AND GYNECOLOGY | Facility: CLINIC | Age: 39
End: 2024-05-10
Payer: COMMERCIAL

## 2024-05-10 DIAGNOSIS — M80.00XD OSTEOPOROSIS WITH CURRENT PATHOLOGICAL FRACTURE WITH ROUTINE HEALING, UNSPECIFIED OSTEOPOROSIS TYPE, SUBSEQUENT ENCOUNTER: ICD-10-CM

## 2024-05-10 NOTE — TELEPHONE ENCOUNTER
Pt CB and confirms receiving pre-op instructions email. NPO/ERAS,  needed and labs to be done 2-3d prior (8/26/24). She understands that OR dept. Will call her the day before surgery with her arrival time. She will CB prn.

## 2024-05-13 PROCEDURE — RXMED WILLOW AMBULATORY MEDICATION CHARGE

## 2024-05-13 RX ORDER — ABALOPARATIDE 2000 UG/ML
80 INJECTION, SOLUTION SUBCUTANEOUS DAILY
Qty: 1.56 ML | Refills: 1 | Status: SHIPPED | OUTPATIENT
Start: 2024-05-13 | End: 2024-11-09

## 2024-05-17 ENCOUNTER — APPOINTMENT (OUTPATIENT)
Dept: NEUROLOGY | Facility: CLINIC | Age: 39
End: 2024-05-17
Payer: COMMERCIAL

## 2024-05-20 ENCOUNTER — TELEPHONE (OUTPATIENT)
Dept: OBSTETRICS AND GYNECOLOGY | Facility: CLINIC | Age: 39
End: 2024-05-20
Payer: COMMERCIAL

## 2024-05-20 ENCOUNTER — PHARMACY VISIT (OUTPATIENT)
Dept: PHARMACY | Facility: CLINIC | Age: 39
End: 2024-05-20
Payer: COMMERCIAL

## 2024-05-20 NOTE — TELEPHONE ENCOUNTER
Message received from zeynep.    Laparoscopic salpingectomy is now to be performed at the [facility] on wed. 11/13/24.  Office will contact pt closer to OR date to ensure she receives surgery details.  Reminder placed in system for nurse to contact pt 10/1/24

## 2024-05-20 NOTE — TELEPHONE ENCOUNTER
----- Message from Davida Coley sent at 5/20/2024  1:01 PM EDT -----  Regarding: CALLUM Winn has a procedure moved from 8-28-24 [facility] to 11-13-24 [place]  CALLUM Winn has a procedure moved from 8-28-24 [facility] to 11-13-24 [place]. The patient is limited on her time allowed off, so this is the date most convenient for her.    DR Winn, please order Labs/PAT if required. Thank you, Davida

## 2024-05-22 ENCOUNTER — ALLIED HEALTH (OUTPATIENT)
Dept: INTEGRATIVE MEDICINE | Facility: CLINIC | Age: 39
End: 2024-05-22
Payer: COMMERCIAL

## 2024-05-22 VITALS
OXYGEN SATURATION: 98 % | HEIGHT: 59 IN | DIASTOLIC BLOOD PRESSURE: 75 MMHG | SYSTOLIC BLOOD PRESSURE: 123 MMHG | BODY MASS INDEX: 23.39 KG/M2 | WEIGHT: 116 LBS | HEART RATE: 85 BPM

## 2024-05-22 DIAGNOSIS — M81.8 IDIOPATHIC OSTEOPOROSIS: ICD-10-CM

## 2024-05-22 DIAGNOSIS — I69.30 HISTORY OF CEREBROVASCULAR ACCIDENT (CVA) WITH RESIDUAL DEFICIT: ICD-10-CM

## 2024-05-22 DIAGNOSIS — F41.9 ANXIETY: Primary | ICD-10-CM

## 2024-05-22 DIAGNOSIS — R53.82 CHRONIC FATIGUE: ICD-10-CM

## 2024-05-22 PROCEDURE — 99417 PROLNG OP E/M EACH 15 MIN: CPT | Performed by: INTERNAL MEDICINE

## 2024-05-22 PROCEDURE — 99215 OFFICE O/P EST HI 40 MIN: CPT | Performed by: INTERNAL MEDICINE

## 2024-05-22 RX ORDER — HYDROXYZINE HYDROCHLORIDE 25 MG/1
TABLET, FILM COATED ORAL
COMMUNITY
End: 2024-06-05 | Stop reason: WASHOUT

## 2024-05-22 ASSESSMENT — ENCOUNTER SYMPTOMS
CONSTIPATION: 0
FATIGUE: 0
ABDOMINAL DISTENTION: 0
UNEXPECTED WEIGHT CHANGE: 0
BACK PAIN: 0
DIZZINESS: 1
NERVOUS/ANXIOUS: 1
SLEEP DISTURBANCE: 1
NUMBNESS: 1
ABDOMINAL PAIN: 0
FREQUENCY: 0
DIARRHEA: 0
ARTHRALGIAS: 1
COUGH: 0

## 2024-05-22 NOTE — ASSESSMENT & PLAN NOTE
Suggest screen for celiac because possible cause of early osteoporosis.  B12 deficiency also in theory can cause hip fracture.   Encouraged that she restart strength training at least twice per week and keep vitamin d between 40-80 ng/ml

## 2024-05-22 NOTE — PROGRESS NOTES
"Integrative Medicine Visit:     Subjective   Patient ID: Maribel Amador is a 38 y.o. female who presents for Anxiety      Anxiety  Symptoms include dizziness and nervous/anxious behavior. Patient reports no suicidal ideas.         Anxiety:  used to run because this helped her anxiety.  She has fractured both of her hips.  She is not allowed to run anymore. She started biking on spin bike which helps a bit. She does not want to go on medication if she does not have to.  She is not particularly complaining of depression. Feels anxious.   She had a stroke when she was 14 years old.  Could not speak shortly afterwards. Still could not figure out why she had the stroke. Went to a number of specialists and had several blood tests. Takes aspirin for stroke prevention. Tried yoga for stress but that did not help. Used to cook a lot. Anxiety began about age 14.  No other trauma. At home when she had the stroke. Never been in counseling after the stroke.        CONCERNS:  wants to improve her anxiety without medication. Does not feel good on medicine.     PMH:  stroke age 14- had some speech difficulty which has improved.  Some memory issues  Osteoporosis-daignosed at age 36.    Broke a hip May 2021. Broke her hip first when running. She was running about 9-15 miles per week.   Then she broke her left hip in January 2022.  Had a dexa scan at that time and found to have osteoporosis. She broke her hip when running again.   Lab Results   Component Value Date    VITD25 35 09/27/2023     No results found for: \"[id number]\"   Fam:  mom had breast cancer twice   Dad has diabetes.     SOC:  xray tech.  Has a boyfriend.  Her 11 year son lives with his father.     NUTRITION: 1-2 cups of coffee. Egg sandiwch with glass of milk and a banana  Dinner: chicken, sweet potatotes, avocado; smoothie- milk, peaches, eric seeds ice, whey protein  Lunch: salad- cabbage, kale, brussel sprouts with poppy seed dressing, kiwi with dark " "chocolate.    Smoking:  non-smoker    Alcohol use:  none    Exercise:   does spin bike regularly 30 minutes x 3time per week.   Used to lift weights. Hard to get to the gym. Does do some planks. Walks in the morning at least five days per week for 2.5-4 miles in the morning    SLEEP: wakes up shaking in the middle of the night- most recent time was February 2024.  Used to wake in the middle of the night. No difficluty falling asleep. Not sure when it started. Still wakes up around 4:30 am and tries to go to bed around 9 pm. Does not snore.     STRESS MANAGEMENT: reads a book   SUPPORT: boyfriend.     Review of Systems   Constitutional:  Negative for fatigue and unexpected weight change.   HENT:  Negative for congestion.    Respiratory:  Negative for cough.    Gastrointestinal:  Negative for abdominal distention, abdominal pain, constipation and diarrhea.   Genitourinary:  Negative for frequency and menstrual problem.        Had difficulty getting pregnant. Almost gave up. Took several years to get pregnant. No miscarriages   Musculoskeletal:  Positive for arthralgias (residual hip pain). Negative for back pain.   Neurological:  Positive for dizziness and numbness (right numbness down right leg sometimes).        Short term memory issues- has trouble with words sometimes.  Can't remember where she put something sometimes.    Psychiatric/Behavioral:  Positive for sleep disturbance (wake in middle of the night with sweats). Negative for suicidal ideas. The patient is nervous/anxious.             Pain:    Objective   /75 (BP Location: Left arm, Patient Position: Sitting, BP Cuff Size: Adult)   Pulse 85   Ht 1.499 m (4' 11\")   Wt 52.6 kg (116 lb)   LMP 04/17/2024   SpO2 98%   BMI 23.43 kg/m²       Physical Exam  HENT:      Head: Normocephalic and atraumatic.      Mouth/Throat:      Mouth: Mucous membranes are moist.   Cardiovascular:      Rate and Rhythm: Normal rate.   Pulmonary:      Effort: Pulmonary " effort is normal. No respiratory distress.   Musculoskeletal:         General: No swelling or deformity.      Cervical back: Normal range of motion.   Skin:     General: Skin is dry.      Findings: No rash.   Neurological:      General: No focal deficit present.      Mental Status: She is alert and oriented to person, place, and time.   Psychiatric:      Comments: Normal affect                      Assessment/Plan     Problem List Items Addressed This Visit             ICD-10-CM    Idiopathic osteoporosis M81.8     Suggest screen for celiac because possible cause of early osteoporosis.  B12 deficiency also in theory can cause hip fracture.   Encouraged that she restart strength training at least twice per week and keep vitamin d between 40-80 ng/ml         Relevant Orders    CBC and Auto Differential    Vitamin D 25-Hydroxy,Total (for eval of Vitamin D levels)    Celiac Panel    Anxiety - Primary F41.9     Offered therapy referral which she is not interested in at this time but could be helpful given hx of trauma- stroke at age 14, her son living with his dad, etc.   Pt agreeable to try acupuncture  Discussed increase exercise.          Relevant Orders    Referral to [facility]    History of cerebrovascular accident (CVA) with residual deficit I69.30     Screen for b12 deficiency and folate def as theorectical cause for stroke  Unclear cause because most recent extensive work up not revealing. Had slightly increase hematocrit... repeat.          Relevant Orders    Vitamin B12    Homocysteine, serum    Folate     Other Visit Diagnoses         Codes    Chronic fatigue     R53.82    Relevant Orders    CBC and Auto Differential    Iron and TIBC    Ferritin          See patient instructions for additional details.       Recommend Follow up in : 2-3 months to discuss diet    Silvana Andrade MD PhD    Time Spent  Prep time on day of patient encounter: 5 minutes  Time spent directly with patient, family or  caregiver: 50 minutes  Additional Time Spent on Patient Care Activities: 0 minutes  Documentation Time: 5 minutes  Other Time Spent: 0 minutes  Total: 60 minutes

## 2024-05-22 NOTE — ASSESSMENT & PLAN NOTE
Screen for b12 deficiency and folate def as theorectical cause for stroke  Unclear cause because most recent extensive work up not revealing. Had slightly increase hematocrit... repeat.

## 2024-05-22 NOTE — ASSESSMENT & PLAN NOTE
Offered therapy referral which she is not interested in at this time but could be helpful given hx of trauma- stroke at age 14, her son living with his dad, etc.   Pt agreeable to try acupuncture  Discussed increase exercise.

## 2024-05-22 NOTE — PATIENT INSTRUCTIONS
Let's recheck your vitamin d to make sure it is in the optimal range.   Screen for celiac given hx of infertility  Screen for b12 deficiency given anxiety, insomnia and hx of stroke.  Restart weight training given your osteoporosis. This is critical! Would start with twice weekly for about 30 minutes.   Try acupuncture for six visits to see if it helps. Aim to have a visit every 1-2 weeks.   If acupuncture, does not help, would consider psychology referral for counseling.  Follow 2-3 months with me.  Silvana Andrade MD PhD

## 2024-05-23 ENCOUNTER — LAB (OUTPATIENT)
Dept: LAB | Facility: LAB | Age: 39
End: 2024-05-23
Payer: COMMERCIAL

## 2024-05-23 DIAGNOSIS — I69.30 HISTORY OF CEREBROVASCULAR ACCIDENT (CVA) WITH RESIDUAL DEFICIT: ICD-10-CM

## 2024-05-23 DIAGNOSIS — M81.8 IDIOPATHIC OSTEOPOROSIS: ICD-10-CM

## 2024-05-23 DIAGNOSIS — R53.82 CHRONIC FATIGUE: ICD-10-CM

## 2024-05-23 LAB
25(OH)D3 SERPL-MCNC: 29 NG/ML (ref 30–100)
BASOPHILS # BLD AUTO: 0.04 X10*3/UL (ref 0–0.1)
BASOPHILS NFR BLD AUTO: 0.6 %
EOSINOPHIL # BLD AUTO: 0.74 X10*3/UL (ref 0–0.7)
EOSINOPHIL NFR BLD AUTO: 11.2 %
ERYTHROCYTE [DISTWIDTH] IN BLOOD BY AUTOMATED COUNT: 12.9 % (ref 11.5–14.5)
FERRITIN SERPL-MCNC: 31 NG/ML (ref 8–150)
FOLATE SERPL-MCNC: 13.5 NG/ML
GLIADIN PEPTIDE IGA SER IA-ACNC: <1 U/ML
HCT VFR BLD AUTO: 44.4 % (ref 36–46)
HGB BLD-MCNC: 14.7 G/DL (ref 12–16)
IMM GRANULOCYTES # BLD AUTO: 0.01 X10*3/UL (ref 0–0.7)
IMM GRANULOCYTES NFR BLD AUTO: 0.2 % (ref 0–0.9)
IRON SATN MFR SERPL: 26 % (ref 25–45)
IRON SERPL-MCNC: 122 UG/DL (ref 35–150)
LYMPHOCYTES # BLD AUTO: 2 X10*3/UL (ref 1.2–4.8)
LYMPHOCYTES NFR BLD AUTO: 30.4 %
MCH RBC QN AUTO: 30.1 PG (ref 26–34)
MCHC RBC AUTO-ENTMCNC: 33.1 G/DL (ref 32–36)
MCV RBC AUTO: 91 FL (ref 80–100)
MONOCYTES # BLD AUTO: 0.41 X10*3/UL (ref 0.1–1)
MONOCYTES NFR BLD AUTO: 6.2 %
NEUTROPHILS # BLD AUTO: 3.38 X10*3/UL (ref 1.2–7.7)
NEUTROPHILS NFR BLD AUTO: 51.4 %
NRBC BLD-RTO: 0 /100 WBCS (ref 0–0)
PLATELET # BLD AUTO: 265 X10*3/UL (ref 150–450)
RBC # BLD AUTO: 4.88 X10*6/UL (ref 4–5.2)
TIBC SERPL-MCNC: 466 UG/DL (ref 240–445)
TTG IGA SER IA-ACNC: <1 U/ML
UIBC SERPL-MCNC: 344 UG/DL (ref 110–370)
VIT B12 SERPL-MCNC: 536 PG/ML (ref 211–911)
WBC # BLD AUTO: 6.6 X10*3/UL (ref 4.4–11.3)

## 2024-05-23 PROCEDURE — 83516 IMMUNOASSAY NONANTIBODY: CPT

## 2024-05-23 PROCEDURE — 83090 ASSAY OF HOMOCYSTEINE: CPT

## 2024-05-23 PROCEDURE — 85025 COMPLETE CBC W/AUTO DIFF WBC: CPT

## 2024-05-23 PROCEDURE — 83550 IRON BINDING TEST: CPT

## 2024-05-23 PROCEDURE — 82746 ASSAY OF FOLIC ACID SERUM: CPT

## 2024-05-23 PROCEDURE — 82306 VITAMIN D 25 HYDROXY: CPT

## 2024-05-23 PROCEDURE — 82607 VITAMIN B-12: CPT

## 2024-05-23 PROCEDURE — 36415 COLL VENOUS BLD VENIPUNCTURE: CPT

## 2024-05-23 PROCEDURE — 82728 ASSAY OF FERRITIN: CPT

## 2024-05-23 PROCEDURE — 83540 ASSAY OF IRON: CPT

## 2024-05-24 LAB — HCYS SERPL-SCNC: 14.34 UMOL/L (ref 5–13.9)

## 2024-05-25 LAB
GLIADIN PEPTIDE IGG SER IA-ACNC: <0.56 FLU (ref 0–4.99)
TTG IGG SER IA-ACNC: <0.82 FLU (ref 0–4.99)

## 2024-06-05 ENCOUNTER — OFFICE VISIT (OUTPATIENT)
Dept: RHEUMATOLOGY | Facility: CLINIC | Age: 39
End: 2024-06-05
Payer: COMMERCIAL

## 2024-06-05 VITALS
TEMPERATURE: 97.7 F | DIASTOLIC BLOOD PRESSURE: 75 MMHG | SYSTOLIC BLOOD PRESSURE: 114 MMHG | HEIGHT: 59 IN | BODY MASS INDEX: 23.18 KG/M2 | HEART RATE: 66 BPM | WEIGHT: 115 LBS

## 2024-06-05 DIAGNOSIS — M81.8 OTHER OSTEOPOROSIS WITHOUT CURRENT PATHOLOGICAL FRACTURE: Primary | ICD-10-CM

## 2024-06-05 PROCEDURE — 99214 OFFICE O/P EST MOD 30 MIN: CPT | Performed by: INTERNAL MEDICINE

## 2024-06-05 PROCEDURE — 1036F TOBACCO NON-USER: CPT | Performed by: INTERNAL MEDICINE

## 2024-06-05 RX ORDER — ERGOCALCIFEROL 1.25 MG/1
50000 CAPSULE ORAL
Qty: 8 CAPSULE | Refills: 1 | Status: SHIPPED | OUTPATIENT
Start: 2024-06-09

## 2024-06-05 ASSESSMENT — PAIN SCALES - GENERAL: PAINLEVEL: 0-NO PAIN

## 2024-06-05 NOTE — PROGRESS NOTES
Recall  Ms. Amador is a 36-year-old  female with a diagnosis of osteoporosis, recent bilateral femoral fractures s/p fixation, CVA at the age of 14 with subsequent diagnosis of protein C deficiency, right upper extremity DVT, femoral acetabular impingement and eosinophilia who presents for follow-up. The patient has developed bilateral stress fractures of the femoral neck s/p fixation by orthopedic surgery. DEXA scan was done in May 2022 and showed osteoporosis.   -DEXA in May 2022 showed T-score 2.5 of L1-L4 (Z-score of -1.9, L. forearm radius -2.3 (Z-score of -2.3)     Tymlos started in 7/22     She is RF and antI-CCP antibody negative. CRP was normal. CONRAD and anti-cardiolipin antibodies negative    Chief Complaint: Management of OP        HPI: At today's visit, the patient reports feeling fine. Has intermittent pain in knuckles and feet. She has no new fractures. She will complete her Tymlos this month.    She has her periods regularly. She is planning Tubal ligation.      Review of Systems  Constitutional: No fatigue  Skin: No rash but Raynaud's  Head: No headache, oral ulcers or hair loss  Neck: No difficulty swallowing or choking  Eyes: Has dry eyes  Mouth: No dry mouth or oral ulcers  Pulmonary: No wheezing, pleurisy or SOB  Cardiovascular: No chest pain or palpitations or dizziness  Gastrointestinal: No abdominal pain, nausea or blood in stool  Musculoskeletal: As H/P        Active Problems  Problems    · Abnormal FSH level (259.9) (E34.9)   · Abnormal LH level (259.9) (E34.9)   · Acute hip pain (719.45) (M25.559)   · Acute lumbar radiculopathy (724.4) (M54.16)   · Arthritis (716.90) (M19.90)   · Bronchitis, acute (466.0) (J20.9)   · Encounter for hearing examination without abnormal findings (V72.19) (Z01.10)   · Encounter for routine gynecological examination (V72.31) (Z01.419)   · Added by Problem List Migration; 2013-7-21; Moved to Suppressed Nov 29 2013 9:11PM   · Eye irritation (379.99)  (H57.89)   · Eye problems (V41.1) (H57.9)   · Finger injury, initial encounter (959.5) (S69.90XA)   · Hand arthritis (716.94) (M19.049)   · High risk medication use (V58.69) (Z79.899)   · Hip flexor tendinitis (727.09) (M76.899)   · Hypercoagulation syndrome (289.81) (D68.59)   · Impacted cerumen of both ears (380.4) (H61.23)   · Infected cat bite (879.9,E906.3) (W55.01XA)   · Left hip pain (719.45) (M25.552)   · Muscle contracture (728.85) (M62.40)   · Neck pain, chronic (723.1,338.29) (M54.2,G89.29)   · Onychomycosis (110.1) (B35.1)   · Orthopedic aftercare (V54.9) (Z47.89)   · Osteopenia (733.90) (M85.80)   · Osteoporosis, idiopathic (733.02) (M81.8)   · Pain in femur (733.90) (M89.8X5)   · Right hip pain (719.45) (M25.551)   · Right shoulder pain (719.41) (M25.511)   · Stress fracture of neck of left femur (733.96) (M84.352A)   · Stress fracture of neck of right femur (733.96) (M84.351A)   · Tinnitus of left ear (388.30) (H93.12)   · TMJ crepitus (524.64) (M26.69)     Past Medical History  Problems    · History of CVA (cerebral infarction) (434.91) (I63.9)   · History of Right hip pain (719.45) (M25.551)   · Resolved Date: 23 May 2021     Family History  Mother    · Family history of Carcinoma of breast, unspecified laterality   · menopausal   · Family history of essential hypertension (V17.49) (Z82.49)  Father    · Family history of diabetes mellitus (V18.0) (Z83.3)  Maternal Aunt    · Family history of Carcinoma of breast, unspecified laterality   · menopausal     Social History  Problems    · Does not use illicit drugs (V49.89) (Z78.9)   · Denied: History of domestic violence   · Never smoker   · Occasional alcohol use   · Occasionally uses seat belt     Allergies  NoKnown    · No Known Allergies   Recorded By: Chel, April; 6/26/2017 6:16:33 PM     Current Meds     Current Outpatient Medications   Medication Sig Dispense Refill    abaloparatide (Tymlos) 80 mcg (3,120 mcg/1.56 mL) pen injector Inject 80 mg  "under the skin once daily. 1.56 mL 1    aspirin 81 mg EC tablet Take 1 tablet (81 mg) by mouth once daily. 30 tablet 2    calcium carbonate-vitamin D3 1,000 mg-20 mcg (800 unit) tablet Take by mouth.      cholecalciferol (Vitamin D-3) 50 MCG (2000 UT) tablet Take 1 tablet (2,000 Units) by mouth once daily.      cyanocobalamin, vitamin B-12, (VITAMIN B-12 ORAL) Take by mouth.      ferrous fumarate-vitamin C (Lucia-Sequeles 65-25) Take 1 tablet by mouth 3 times daily (morning, midday, late afternoon). Do not crush, chew, or split.      phytonadione, vit K1, (vitamin k) 100 mcg tablet Take 1 tablet (100 mcg) by mouth once daily.      TURMERIC ORAL Take by mouth.      hydrOXYzine HCL (Atarax) 10 mg tablet Take 1 tablet (10 mg) by mouth every 6 hours if needed for anxiety. 30 tablet 2    hydrOXYzine HCL (Atarax) 25 mg tablet Take by mouth.       No current facility-administered medications for this visit.          Vitals  Blood pressure 114/75, pulse 66, temperature 36.5 °C (97.7 °F), height 1.499 m (4' 11\"), weight 52.2 kg (115 lb).  Physical Exam  General - NAD, sitting up in chair, well-groomed, pleasant, AAOx3  Head: Normocephalic, atraumatic  Eyes - PERRLA, EOMI. No conjunctiva injection.   Mouth/ENT - Moist oral and nasal mucosa.  Cardiovascular - Normal S1, S2. Regular rate and rhythm. No murmurs or rubs.  Lungs - Symmetric chest expansion. Clear to auscultation bilaterally.   Skin - No rashes or ulcers. Skin warm and dry. No erythema on bilateral cheeks.  Extremities - No edema, cyanosis ,or clubbing  Musculoskeletal -  .EXAMJOINTDETAILED,  Shoulders: Full ROM, without pain, no swelling, warmth or tenderness.  Elbows: Full ROM, without pain, no swelling, warmth or tenderness.  Wrists: Full ROM, without pain, no swelling, warmth or tenderness.  MCP: No swelling, warmth or tenderness. Metacarpal squeeze negative  PIP: No swelling, warmth or tenderness.  DIP: No swelling, warmth or tenderness.  Hands : 5/5.    "     Assessment/plan:    38 yr old WF with Osteoporosis, most likely related to her heparin use in pregnancy.   Schedule DXA. Markers of bone turnover. She does not want to have any more kids. In that case would use Fosamax.   Vitamin D deficiency: Start ergocalciferol 50,000 units weekly x 8 weeks.     Reviewed and approved by NIMESH PARK on 6/5/24 at 8:55 AM.

## 2024-06-06 PROCEDURE — RXMED WILLOW AMBULATORY MEDICATION CHARGE

## 2024-06-12 ENCOUNTER — SPECIALTY PHARMACY (OUTPATIENT)
Dept: PHARMACY | Facility: CLINIC | Age: 39
End: 2024-06-12

## 2024-06-13 ENCOUNTER — PHARMACY VISIT (OUTPATIENT)
Dept: PHARMACY | Facility: CLINIC | Age: 39
End: 2024-06-13
Payer: COMMERCIAL

## 2024-06-13 ENCOUNTER — SPECIALTY PHARMACY (OUTPATIENT)
Dept: PHARMACY | Facility: CLINIC | Age: 39
End: 2024-06-13

## 2024-06-28 ENCOUNTER — HOSPITAL ENCOUNTER (OUTPATIENT)
Dept: RADIOLOGY | Facility: CLINIC | Age: 39
Discharge: HOME | End: 2024-06-28
Payer: COMMERCIAL

## 2024-06-28 DIAGNOSIS — M81.8 OTHER OSTEOPOROSIS WITHOUT CURRENT PATHOLOGICAL FRACTURE: ICD-10-CM

## 2024-06-28 PROCEDURE — 77080 DXA BONE DENSITY AXIAL: CPT

## 2024-07-03 ENCOUNTER — APPOINTMENT (OUTPATIENT)
Dept: INTEGRATIVE MEDICINE | Facility: CLINIC | Age: 39
End: 2024-07-03
Payer: COMMERCIAL

## 2024-07-03 DIAGNOSIS — F41.9 ANXIETY: Primary | ICD-10-CM

## 2024-07-03 PROCEDURE — 97811 ACUP 1/> W/O ESTIM EA ADD 15: CPT | Performed by: NATUROPATH

## 2024-07-03 PROCEDURE — 97810 ACUP 1/> WO ESTIM 1ST 15 MIN: CPT | Performed by: NATUROPATH

## 2024-07-03 PROCEDURE — 99202 OFFICE O/P NEW SF 15 MIN: CPT | Performed by: NATUROPATH

## 2024-07-03 ASSESSMENT — ENCOUNTER SYMPTOMS
SHORTNESS OF BREATH: 0
PALPITATIONS: 0
NAUSEA: 0
VOMITING: 0
CHILLS: 0
DIFFICULTY URINATING: 0
HEADACHES: 1
WHEEZING: 0
TROUBLE SWALLOWING: 0
RHINORRHEA: 0
ARTHRALGIAS: 1
FEVER: 0
COUGH: 0
CONSTIPATION: 0

## 2024-07-03 NOTE — PROGRESS NOTES
Acupuncture Visit:     Subjective   Patient ID: Maribel Amador is a 38 y.o. female who presents for No chief complaint on file.  Anxiety-  random can wake middle of night  May happen during the day  Can go away quickly or hang for a week  Can feel heart racing, feels sweaty, no nausea, some HA  No diarrhea.   Deep breathing, biking, walking, reading,   Cooking  Tried ashwagandha with no benefit  Was Rx hydroxyzine, but takes rarely PRN      Sleep- tried to relax 8-10p, not always falling asleep easily. Wakes around 4-430, sometimes falls back asleep.   No meds for sleep    Energy- no issues    Hx of stroke at 14,   Broke both hips from running, dx with osteoporosis. , some pain from surgeries from screws. Occ numbness in right leg to foot post surgery, comes and goes.     Diet:   B: oatmeal, eggs, pb toast, banana  eggs avocado, milk,   L: salad, chickpeas, avocoado,   D; salmon, chicken  Sn: smoothie, yogurt, berries  Dr: water, coffee, latte, tea    BM: daily, well formed.                 Session Information  Is this acupuncture treatment being billed to the patient's insurance company: Yes  This is visit number: 1  The patient has a total number of visits of: 20  Initial Acupuncture Treatment date: 07/03/24  Name of Insurance Company:   Visit Type: New patient  Medical History Reviewed: I have reviewed pertinent medical history in EHR, and no contraindications are present to provide treatment         Review of Systems   Constitutional:  Negative for chills and fever.   HENT:  Positive for tinnitus (has seen EENT, left is worse). Negative for congestion, ear pain, rhinorrhea and trouble swallowing.    Eyes:  Negative for visual disturbance.   Respiratory:  Negative for cough, shortness of breath and wheezing.    Cardiovascular:  Negative for chest pain and palpitations.   Gastrointestinal:  Negative for constipation, nausea and vomiting.   Genitourinary:  Negative for difficulty urinating and menstrual  problem.   Musculoskeletal:  Positive for arthralgias (elbows and hands,).   Skin:  Negative for rash.   Neurological:  Positive for headaches (occ with cycle.).            Provider reviewed plan for the acupuncture session, precautions and contraindications. Patient/guardian/hospital staff has given consent to treat with full understanding of what to expect during the session. Before acupuncture began, provider explained to the patient to communicate at any time if the procedure was causing discomfort past their tolerance level. Patient agreed to advise acupuncturist. The acupuncturist counseled the patient on the risks of acupuncture treatment including pain, infection, bleeding, and no relief of pain. The patient was positioned comfortably. There was no evidence of infection at the site of needle insertions.    Objective   Physical Exam                             Assessment/Plan   Diagnoses and all orders for this visit:  Anxiety (Primary)      Supplement recommendations:  Gina WS 1265 (60 Softgels) ([employer]): Please take  1 gel, twice / day  ongoing (with a full glass of water. If you get lavender burps and they are bothersome, you can but the whole package in the freezer and take the capsules from there. )

## 2024-07-09 ENCOUNTER — SPECIALTY PHARMACY (OUTPATIENT)
Dept: PHARMACY | Facility: CLINIC | Age: 39
End: 2024-07-09

## 2024-07-09 NOTE — PROGRESS NOTES
[facility] Clinical Note    Maribel Amador is a 38 y.o. female, who is on the specialty pharmacy service of: [facility].  Maribel Amador is taking: Tymlos. Therapy is now completed as of end of June 2024     Maribel was contacted on 7/9/2024.    Refer to the encounter summary report for documentation details about patient counseling and education.      Impression/Plan  Is patient high risk? (potential patients:  pregnancy, geriatric, pediatric)   no  Is laboratory follow-up needed? Per MD  Is a clinical intervention needed?  no  Next assessment date?  None - therapy completed  Additional comments:    Medication Adherence  The importance of adherence was discussed with the patient and they were advised to take the medication as prescribed by their provider. Maribel was encouraged to call her physician's office if they have a question regarding a missed dose.        Patient advised to contact the pharmacy if there are any changes to her medication list, including prescriptions, OTC medications, herbal products, or supplements. Patient was advised of [facility]’s dispensing process, refill timeline, contact information ([phone]), and patient management follow up. Patient confirmed understanding of education conducted during assessment. All patient questions and concerns were addressed to the best of my ability. Patient was encouraged to contact the specialty pharmacy with any questions or concerns.    Confirmed follow-up outreaches are properly scheduled. Reviewed goals of therapy in the program targets.    Aron Chacon, PharmD

## 2024-07-12 ENCOUNTER — SPECIALTY PHARMACY (OUTPATIENT)
Dept: PHARMACY | Facility: CLINIC | Age: 39
End: 2024-07-12

## 2024-07-16 ENCOUNTER — SPECIALTY PHARMACY (OUTPATIENT)
Dept: PHARMACY | Facility: CLINIC | Age: 39
End: 2024-07-16

## 2024-07-18 ENCOUNTER — LAB (OUTPATIENT)
Dept: LAB | Facility: LAB | Age: 39
End: 2024-07-18
Payer: COMMERCIAL

## 2024-07-18 DIAGNOSIS — M81.8 OTHER OSTEOPOROSIS WITHOUT CURRENT PATHOLOGICAL FRACTURE: ICD-10-CM

## 2024-07-18 LAB
25(OH)D3 SERPL-MCNC: 83 NG/ML (ref 30–100)
ALBUMIN SERPL BCP-MCNC: 4.2 G/DL (ref 3.4–5)
ALP SERPL-CCNC: 75 U/L (ref 33–110)
ALT SERPL W P-5'-P-CCNC: 31 U/L (ref 7–45)
ANION GAP SERPL CALC-SCNC: 14 MMOL/L (ref 10–20)
AST SERPL W P-5'-P-CCNC: 23 U/L (ref 9–39)
BILIRUB SERPL-MCNC: 0.4 MG/DL (ref 0–1.2)
BUN SERPL-MCNC: 17 MG/DL (ref 6–23)
CALCIUM SERPL-MCNC: 9.4 MG/DL (ref 8.6–10.6)
CHLORIDE SERPL-SCNC: 101 MMOL/L (ref 98–107)
CO2 SERPL-SCNC: 27 MMOL/L (ref 21–32)
CREAT SERPL-MCNC: 0.71 MG/DL (ref 0.5–1.05)
EGFRCR SERPLBLD CKD-EPI 2021: >90 ML/MIN/1.73M*2
GLUCOSE SERPL-MCNC: 79 MG/DL (ref 74–99)
POTASSIUM SERPL-SCNC: 4.3 MMOL/L (ref 3.5–5.3)
PROT SERPL-MCNC: 6.8 G/DL (ref 6.4–8.2)
SODIUM SERPL-SCNC: 138 MMOL/L (ref 136–145)

## 2024-07-18 PROCEDURE — 84078 ASSAY ALKALINE PHOSPHATASE: CPT

## 2024-07-18 PROCEDURE — 80053 COMPREHEN METABOLIC PANEL: CPT

## 2024-07-18 PROCEDURE — 36415 COLL VENOUS BLD VENIPUNCTURE: CPT

## 2024-07-18 PROCEDURE — 82306 VITAMIN D 25 HYDROXY: CPT

## 2024-07-18 PROCEDURE — 82523 COLLAGEN CROSSLINKS: CPT

## 2024-07-19 LAB — ALP BONE SERPL-MCNC: 13.9 UG/L

## 2024-07-20 ENCOUNTER — TELEPHONE (OUTPATIENT)
Dept: RHEUMATOLOGY | Facility: CLINIC | Age: 39
End: 2024-07-20
Payer: COMMERCIAL

## 2024-07-20 DIAGNOSIS — M81.8 OTHER OSTEOPOROSIS WITHOUT CURRENT PATHOLOGICAL FRACTURE: Primary | ICD-10-CM

## 2024-07-20 LAB — COLLAGEN CTX SERPL-MCNC: 257 PG/ML

## 2024-07-20 PROCEDURE — RXMED WILLOW AMBULATORY MEDICATION CHARGE

## 2024-07-20 RX ORDER — ALENDRONATE SODIUM 70 MG/1
70 TABLET ORAL
Qty: 12 TABLET | Refills: 3 | Status: SHIPPED | OUTPATIENT
Start: 2024-07-20 | End: 2025-07-20

## 2024-07-23 ENCOUNTER — PHARMACY VISIT (OUTPATIENT)
Dept: PHARMACY | Facility: CLINIC | Age: 39
End: 2024-07-23
Payer: COMMERCIAL

## 2024-07-31 ENCOUNTER — TELEPHONE (OUTPATIENT)
Dept: RHEUMATOLOGY | Facility: CLINIC | Age: 39
End: 2024-07-31
Payer: COMMERCIAL

## 2024-07-31 NOTE — TELEPHONE ENCOUNTER
----- Message from Anil Delacruz sent at 7/31/2024  5:38 PM EDT -----  She can take Motrin 600 mg up to three times a day after meals.    The pain should resolve within a day or two if it is from one dose of fosamax.  ----- Message -----  From: Amber Valente RN  Sent: 7/31/2024   4:39 PM EDT  To: Anil Delacruz MD    Patient has very significant generalized pains in upper torso up to shoulders and feels like musculoskeletal pains since this has a long half-life, do have other suggestions medications wise to help with pain? She is using as needed Motrin and hot packs and stretching and it's barely touching the pain. Please advise further or prescribe something for short term?  ----- Message -----  From: Anil Delacruz MD  Sent: 7/31/2024   4:01 PM EDT  To: Amber Valente RN    That decision should come from Dr Martins as she is her primary rheumatologist  She will be back next week.

## 2024-08-06 ENCOUNTER — APPOINTMENT (OUTPATIENT)
Dept: INTEGRATIVE MEDICINE | Facility: CLINIC | Age: 39
End: 2024-08-06
Payer: COMMERCIAL

## 2024-08-06 DIAGNOSIS — F41.9 ANXIETY: Primary | ICD-10-CM

## 2024-08-06 DIAGNOSIS — M81.8 IDIOPATHIC OSTEOPOROSIS: ICD-10-CM

## 2024-08-06 DIAGNOSIS — I69.30 HISTORY OF CEREBROVASCULAR ACCIDENT (CVA) WITH RESIDUAL DEFICIT: ICD-10-CM

## 2024-08-06 PROCEDURE — 97810 ACUP 1/> WO ESTIM 1ST 15 MIN: CPT | Performed by: NATUROPATH

## 2024-08-06 PROCEDURE — 97811 ACUP 1/> W/O ESTIM EA ADD 15: CPT | Performed by: NATUROPATH

## 2024-08-06 ASSESSMENT — ENCOUNTER SYMPTOMS
CHILLS: 0
PALPITATIONS: 0
VOMITING: 0
COUGH: 0
ARTHRALGIAS: 1
TROUBLE SWALLOWING: 0
WHEEZING: 0
HEADACHES: 1
FEVER: 0
RHINORRHEA: 0
SHORTNESS OF BREATH: 0
DIFFICULTY URINATING: 0
CONSTIPATION: 0
NAUSEA: 0

## 2024-08-06 NOTE — PROGRESS NOTES
Acupuncture Visit:     Subjective   Patient ID: Maribel Amador is a 38 y.o. female who presents for No chief complaint on file.  Feels more relaxed overall  Had one episode of more anxiety, was able to clear quickly  Started on Lavela, no issues  Sleep- A  lot better, still waking occ in middle of night, able to get back to sleep;     Energy- good.     BM good.     Started fosamax, had significant bone josefina muscle pain, had to stop. Has FU with PCP for dosing changes            Initial:  Anxiety-  random can wake middle of night  May happen during the day  Can go away quickly or hang for a week  Can feel heart racing, feels sweaty, no nausea, some HA  No diarrhea.   Deep breathing, biking, walking, reading,   Cooking  Tried ashwagandha with no benefit  Was Rx hydroxyzine, but takes rarely PRN      Sleep- tried to relax 8-10p, not always falling asleep easily. Wakes around 4-430, sometimes falls back asleep.   No meds for sleep    Energy- no issues    Hx of stroke at 14,   Broke both hips from running, dx with osteoporosis. , some pain from surgeries from screws. Occ numbness in right leg to foot post surgery, comes and goes.     Diet:   B: oatmeal, eggs, pb toast, banana  eggs avocado, milk,   L: salad, chickpeas, avocoado,   D; salmon, chicken  Sn: smoothie, yogurt, berries  Dr: water, coffee, latte, tea    BM: daily, well formed.                           Review of Systems   Constitutional:  Negative for chills and fever.   HENT:  Positive for tinnitus (has seen EENT, left is worse). Negative for congestion, ear pain, rhinorrhea and trouble swallowing.    Eyes:  Negative for visual disturbance.   Respiratory:  Negative for cough, shortness of breath and wheezing.    Cardiovascular:  Negative for chest pain and palpitations.   Gastrointestinal:  Negative for constipation, nausea and vomiting.   Genitourinary:  Negative for difficulty urinating and menstrual problem.   Musculoskeletal:  Positive for arthralgias  (elbows and hands,).   Skin:  Negative for rash.   Neurological:  Positive for headaches (occ with cycle.).            Provider reviewed plan for the acupuncture session, precautions and contraindications. Patient/guardian/hospital staff has given consent to treat with full understanding of what to expect during the session. Before acupuncture began, provider explained to the patient to communicate at any time if the procedure was causing discomfort past their tolerance level. Patient agreed to advise acupuncturist. The acupuncturist counseled the patient on the risks of acupuncture treatment including pain, infection, bleeding, and no relief of pain. The patient was positioned comfortably. There was no evidence of infection at the site of needle insertions.    Objective   Physical Exam         Row Name 07/03/24 0819   Acupuncture Treatment   Body Points - Bilateral Jina bkaer may, yin tong, Li4, HT7, SP6, KI6, LR3   Auricular Points Yes   Auricular Points - Bilateral Baker men, PT 0   Other Techniques Utilized TDP Lamp   TDP Lamp Descripton feet   Needle Count In 19   Needle Count Out 19   Needle Retention Time (min) 25 minutes   Total Face to Face Time (min) 45 minutes                         Assessment/Plan   Diagnoses and all orders for this visit:  Anxiety (Primary)  Idiopathic osteoporosis  History of cerebrovascular accident (CVA) with residual deficit        Supplement recommendations:  Lavela WS 1265 (60 Softgels) ([employer]): Please take  1 gel, twice / day  ongoing (with a full glass of water. If you get lavender burps and they are bothersome, you can but the whole package in the freezer and take the capsules from there. )

## 2024-08-13 DIAGNOSIS — M81.8 IDIOPATHIC OSTEOPOROSIS: Primary | ICD-10-CM

## 2024-08-13 RX ORDER — IBANDRONATE SODIUM 150 MG/1
150 TABLET, FILM COATED ORAL
Qty: 1 TABLET | Refills: 11 | Status: SHIPPED | OUTPATIENT
Start: 2024-08-13 | End: 2025-08-13

## 2024-08-14 PROCEDURE — RXMED WILLOW AMBULATORY MEDICATION CHARGE

## 2024-08-16 ENCOUNTER — SPECIALTY PHARMACY (OUTPATIENT)
Dept: PHARMACY | Facility: CLINIC | Age: 39
End: 2024-08-16

## 2024-08-19 ENCOUNTER — APPOINTMENT (OUTPATIENT)
Dept: SLEEP MEDICINE | Facility: CLINIC | Age: 39
End: 2024-08-19
Payer: COMMERCIAL

## 2024-08-19 VITALS
DIASTOLIC BLOOD PRESSURE: 78 MMHG | SYSTOLIC BLOOD PRESSURE: 117 MMHG | HEIGHT: 60 IN | HEART RATE: 78 BPM | BODY MASS INDEX: 22.26 KG/M2 | WEIGHT: 113.4 LBS | TEMPERATURE: 96.3 F | OXYGEN SATURATION: 98 %

## 2024-08-19 DIAGNOSIS — G47.30 SLEEP APNEA, UNSPECIFIED TYPE: Primary | ICD-10-CM

## 2024-08-19 DIAGNOSIS — G47.9 SLEEP DISTURBANCE: ICD-10-CM

## 2024-08-19 PROCEDURE — 3008F BODY MASS INDEX DOCD: CPT | Performed by: PHYSICIAN ASSISTANT

## 2024-08-19 PROCEDURE — 99213 OFFICE O/P EST LOW 20 MIN: CPT | Performed by: PHYSICIAN ASSISTANT

## 2024-08-19 PROCEDURE — 1036F TOBACCO NON-USER: CPT | Performed by: PHYSICIAN ASSISTANT

## 2024-08-19 NOTE — PROGRESS NOTES
Patient: Maribel Amador    [id number]  : 1985 -- AGE 38 y.o.    Provider: Silvana Contreras PA-C     Location [facility]   Service Date: 2024              [facility]  New Visit Note      HISTORY OF PRESENT ILLNESS     The patient's referring provider is: Bazella, Corinne A, MD; Jerry Ramsay MD    HISTORY OF PRESENT ILLNESS   Maribel Amador is a 38 y.o. female with h/o CVA who presents to a [facility] for a sleep medicine evaluation with concerns of Establish Care.     Past Sleep History  Patient has not had a sleep study in the past.        Current History    On today's visit, the patient reports she is here for evaluation of several years intermittent episodes of disrupted sleep. States several times per month she may have a night where she wakes up 1-3 times per night. Unsure what wakes her up. Often associated with a night sweat and sometimes palpitations. When episodes happen, she often has a hard time getting back to sleep. Does report history of snoring, more so when she sleeps on her back. Denies gasping/choking for air or witnessed apneas. Denies morning headache or dry mouth.    Did have a stroke ~4 years ago that left her with some right hand numbness and reports short term memory issues. No prior sleep studies.     Does try to turn off electronics in the hour leading up to bed, switches to reading.     Denies symptoms of RLS, parasomnias or other concerns.   Denies excessive daytime sleepiness.    Sleep schedule:  *works as a [employer] at    In bed: 8-9 P  Subjective sleep latency:  varies, can be quick or 1+ hours; most nights about 30 minutes   Awakenings during night:  0-3; majority of the time she does sleep through the night  Length of awakenings:  vaqries; can be prolonged   Final awakening time:  6-7A  Naps: none     Overall estimate of total sleep time: 7-8 hours   Weekends/Days off: same      Preferred sleeping position: SLEEP POSITION: supine and sidelying      ESS:  0  VALENTINA:  7  FOSQ: 36      REVIEW OF SYSTEMS     REVIEW OF SYSTEMS  See HPI; all other ROS were reviewed and negative for compliant        ALLERGIES AND MEDICATIONS     ALLERGIES  No Known Allergies    MEDICATIONS  Current Outpatient Medications   Medication Sig Dispense Refill    aspirin 81 mg EC tablet Take 1 tablet (81 mg) by mouth once daily. 30 tablet 2    calcium carbonate-vitamin D3 1,000 mg-20 mcg (800 unit) tablet Take by mouth.      cyanocobalamin, vitamin B-12, (VITAMIN B-12 ORAL) Take by mouth.      ferrous fumarate-vitamin C (Lucia-Sequeles 65-25) Take 1 tablet by mouth 3 times daily (morning, midday, late afternoon). Do not crush, chew, or split.      phytonadione, vit K1, (vitamin k) 100 mcg tablet Take 1 tablet (100 mcg) by mouth once daily.      TURMERIC ORAL Take by mouth.      alendronate (Fosamax) 70 mg tablet Take 1 tablet (70 mg) by mouth every 7 days. Take on an empty stomach. Do not lie down or eat for 1/2 hour after taking. (Patient not taking: Reported on 8/19/2024) 12 tablet 3    hydrOXYzine HCL (Atarax) 10 mg tablet Take 1 tablet (10 mg) by mouth every 6 hours if needed for anxiety. 30 tablet 2    ibandronate (Boniva) 150 mg tablet Take 1 tablet (150 mg) by mouth every 30 (thirty) days. Take on an empty stomach. Do not lie down or eat for 1/2 hour after taking. 1 tablet 11     No current facility-administered medications for this visit.         PAST HISTORY     PAST MEDICAL HISTORY  She  has a past medical history of Abnormal FSH level (09/20/2023), Abnormal LH level (09/20/2023), Cerebral infarction, unspecified (Multi) (03/24/2015), Muscle contracture (09/20/2023), Pain in right hip (04/24/2015), and Stress fracture of neck of left femur (09/20/2023).    PAST SURGICAL HISTORY:  No past surgical history on file.    FAMILY HISTORY  Family History   Problem Relation Name Age of Onset    Bilateral breast cancer  "Mother  51        twice    Peripheral vascular disease Mother          ulcers on her legs    Diabetes Father      Other (chronic hypertenstion) Father      Hyperlipidemia Father         She does not have a family history of sleep disorder.    SOCIAL HISTORY  She  reports that she has never smoked. She has never used smokeless tobacco. She reports that she does not drink alcohol and does not use drugs. She    Caffeine consumption: Yes, 1-2 cups/day  Alcohol consumption: No  Marijuana: No      PHYSICAL EXAM     VITAL SIGNS: /78 (BP Location: Right arm, Patient Position: Sitting, BP Cuff Size: Adult)   Pulse 78   Temp 35.7 °C (96.3 °F) (Temporal)   Ht 1.518 m (4' 11.75\")   Wt 51.4 kg (113 lb 6.4 oz)   SpO2 98%   BMI 22.33 kg/m²      CURRENT WEIGHT:   Vitals:    08/19/24 1256   Weight: 51.4 kg (113 lb 6.4 oz)     Body mass index is 22.33 kg/m².  PREVIOUS WEIGHTS:  Wt Readings from Last 3 Encounters:   08/19/24 51.4 kg (113 lb 6.4 oz)   06/05/24 52.2 kg (115 lb)   05/22/24 52.6 kg (116 lb)       Constitutional: Alert and oriented, cooperative, no acute distress  Head: Normocephalic, atraumatic   Cranial Features: No abnormal craniofacial features     Upper Airway Examination:  Mallampati Class: 3  Tonsil Grade: 1+  Tongue Scalloping: present  Uvula: midline    Neck: Supple. Trachea midline.  Pulmonary: Non-labored breathing, speaks in full sentences.   Cardiac: regular rate   Extremities: No clubbing, no edema  Neuromuscular: Cranial nerves grossly intact, no focal deficits      RESULTS/DATA     Bicarbonate (mmol/L)   Date Value   07/18/2024 27   09/27/2023 25   12/31/2022 26   08/25/2022 30     Iron (ug/dL)   Date Value   05/23/2024 122     % Saturation (%)   Date Value   05/23/2024 26     TIBC (ug/dL)   Date Value   05/23/2024 466 (H)     Ferritin (ng/mL)   Date Value   05/23/2024 31             ASSESSMENT/PLAN     Ms. Amador is a 38 y.o. female and She was referred to the [facility] " Medicine Clinic for evaluation of possible sleep disordered breathing    Problem List, Orders, Assessment, Recommendations:  Problem List Items Addressed This Visit             ICD-10-CM    Sleep apnea - Primary G47.30     SLEEP APNEA - SUSPECTED  -in lab study to due premenopausal female/more accurate results   -avoid supine sleep  -avoid drowsy driving            Relevant Orders    In-Center Sleep Study (Sleep Provider Only)     Other Visit Diagnoses         Codes    Sleep disturbance     G47.9            Disposition    Return to clinic in 3-4 months  -will call with results if way ahead of appt

## 2024-08-19 NOTE — ASSESSMENT & PLAN NOTE
SLEEP APNEA - SUSPECTED  -in lab study to due premenopausal female/more accurate results   -avoid supine sleep  -avoid drowsy driving

## 2024-08-20 ENCOUNTER — APPOINTMENT (OUTPATIENT)
Dept: INTEGRATIVE MEDICINE | Facility: CLINIC | Age: 39
End: 2024-08-20
Payer: COMMERCIAL

## 2024-08-20 DIAGNOSIS — F41.9 ANXIETY: Primary | ICD-10-CM

## 2024-08-20 DIAGNOSIS — M81.8 IDIOPATHIC OSTEOPOROSIS: ICD-10-CM

## 2024-08-20 PROCEDURE — 97811 ACUP 1/> W/O ESTIM EA ADD 15: CPT | Performed by: NATUROPATH

## 2024-08-20 PROCEDURE — 97810 ACUP 1/> WO ESTIM 1ST 15 MIN: CPT | Performed by: NATUROPATH

## 2024-08-20 ASSESSMENT — ENCOUNTER SYMPTOMS
TROUBLE SWALLOWING: 0
WHEEZING: 0
ARTHRALGIAS: 1
SHORTNESS OF BREATH: 0
HEADACHES: 1
FEVER: 0
CONSTIPATION: 0
PALPITATIONS: 0
CHILLS: 0
RHINORRHEA: 0
NAUSEA: 0
DIFFICULTY URINATING: 0
VOMITING: 0
COUGH: 0

## 2024-08-20 NOTE — PROGRESS NOTES
Acupuncture Visit:     Subjective   Patient ID: Maribel Amador is a 38 y.o. female who presents for No chief complaint on file.  Feeling well  4 days of sleep was off, more anxiety since  Has reduced back to normal recently.   No issues with lavela.     Energy good  BM normal            Feels more relaxed overall  Had one episode of more anxiety, was able to clear quickly  Started on Lavela, no issues  Sleep- A  lot better, still waking occ in middle of night, able to get back to sleep;     Energy- good.     BM good.     Started fosamax, had significant bone josefina muscle pain, had to stop. Has FU with PCP for dosing changes            Initial:  Anxiety-  random can wake middle of night  May happen during the day  Can go away quickly or hang for a week  Can feel heart racing, feels sweaty, no nausea, some HA  No diarrhea.   Deep breathing, biking, walking, reading,   Cooking  Tried ashwagandha with no benefit  Was Rx hydroxyzine, but takes rarely PRN      Sleep- tried to relax 8-10p, not always falling asleep easily. Wakes around 4-430, sometimes falls back asleep.   No meds for sleep    Energy- no issues    Hx of stroke at 14,   Broke both hips from running, dx with osteoporosis. , some pain from surgeries from screws. Occ numbness in right leg to foot post surgery, comes and goes.     Diet:   B: oatmeal, eggs, pb toast, banana  eggs avocado, milk,   L: salad, chickpeas, avocoado,   D; salmon, chicken  Sn: smoothie, yogurt, berries  Dr: water, coffee, latte, tea    BM: daily, well formed.                 Session Information  Is this acupuncture treatment being billed to the patient's insurance company: Yes  This is visit number: 3  The patient has a total number of visits of: 20  Initial Acupuncture Treatment date: 07/03/24  Name of Insurance Company:   Visit Type: Follow-up visit  Medical History Reviewed: I have reviewed pertinent medical history in EHR, and no contraindications are present to provide  treatment         Review of Systems   Constitutional:  Negative for chills and fever.   HENT:  Positive for tinnitus (has seen EENT, left is worse). Negative for congestion, ear pain, rhinorrhea and trouble swallowing.    Eyes:  Negative for visual disturbance.   Respiratory:  Negative for cough, shortness of breath and wheezing.    Cardiovascular:  Negative for chest pain and palpitations.   Gastrointestinal:  Negative for constipation, nausea and vomiting.   Genitourinary:  Negative for difficulty urinating and menstrual problem.   Musculoskeletal:  Positive for arthralgias (elbows and hands,).   Skin:  Negative for rash.   Neurological:  Positive for headaches (occ with cycle.).            Provider reviewed plan for the acupuncture session, precautions and contraindications. Patient/guardian/hospital staff has given consent to treat with full understanding of what to expect during the session. Before acupuncture began, provider explained to the patient to communicate at any time if the procedure was causing discomfort past their tolerance level. Patient agreed to advise acupuncturist. The acupuncturist counseled the patient on the risks of acupuncture treatment including pain, infection, bleeding, and no relief of pain. The patient was positioned comfortably. There was no evidence of infection at the site of needle insertions.    Objective   Physical Exam         Row Name 07/03/24 0819   Acupuncture Treatment   Body Points - Bilateral Jina baker may, yin tong, Li4, HT7, SP6, KI6, LR3   Auricular Points Yes   Auricular Points - Bilateral Baker men, PT 0   Other Techniques Utilized TDP Lamp   TDP Lamp Descripton feet   Needle Count In 19   Needle Count Out 19   Needle Retention Time (min) 25 minutes   Total Face to Face Time (min) 45 minutes                         Assessment/Plan   Diagnoses and all orders for this visit:  Anxiety (Primary)  Idiopathic osteoporosis          Supplement recommendations:  Gina FALCON  1265 (60 Softgels) ([employer]): Please take  1 gel, twice / day  ongoing (with a full glass of water. If you get lavender burps and they are bothersome, you can but the whole package in the freezer and take the capsules from there. )

## 2024-08-21 ENCOUNTER — SPECIALTY PHARMACY (OUTPATIENT)
Dept: PHARMACY | Facility: CLINIC | Age: 39
End: 2024-08-21

## 2024-08-21 ENCOUNTER — PHARMACY VISIT (OUTPATIENT)
Dept: PHARMACY | Facility: CLINIC | Age: 39
End: 2024-08-21
Payer: COMMERCIAL

## 2024-08-22 ENCOUNTER — APPOINTMENT (OUTPATIENT)
Dept: PRIMARY CARE | Facility: CLINIC | Age: 39
End: 2024-08-22
Payer: COMMERCIAL

## 2024-08-22 VITALS
OXYGEN SATURATION: 98 % | BODY MASS INDEX: 22.38 KG/M2 | WEIGHT: 114 LBS | HEIGHT: 60 IN | TEMPERATURE: 97 F | HEART RATE: 77 BPM | DIASTOLIC BLOOD PRESSURE: 71 MMHG | SYSTOLIC BLOOD PRESSURE: 114 MMHG

## 2024-08-22 DIAGNOSIS — Z00.00 HEALTHCARE MAINTENANCE: Primary | ICD-10-CM

## 2024-08-22 DIAGNOSIS — F41.9 ANXIETY: ICD-10-CM

## 2024-08-22 DIAGNOSIS — E78.2 MODERATE MIXED HYPERLIPIDEMIA NOT REQUIRING STATIN THERAPY: ICD-10-CM

## 2024-08-22 PROCEDURE — 99395 PREV VISIT EST AGE 18-39: CPT | Performed by: STUDENT IN AN ORGANIZED HEALTH CARE EDUCATION/TRAINING PROGRAM

## 2024-08-22 PROCEDURE — 3008F BODY MASS INDEX DOCD: CPT | Performed by: STUDENT IN AN ORGANIZED HEALTH CARE EDUCATION/TRAINING PROGRAM

## 2024-08-22 PROCEDURE — 1036F TOBACCO NON-USER: CPT | Performed by: STUDENT IN AN ORGANIZED HEALTH CARE EDUCATION/TRAINING PROGRAM

## 2024-08-22 ASSESSMENT — PAIN SCALES - GENERAL: PAINLEVEL: 0-NO PAIN

## 2024-08-22 NOTE — PATIENT INSTRUCTIONS
Please stop at any  lab ([address] if you choose to use my building) to complete your blood and/or urine work that I've ordered for you.    I will contact you with the results at my soonest convenience. I strongly urge you to use [employer] as this is the quickest and easiest way to access your results and receive my correspondences.     Follow up with your specialists as previously scheduled.     See me yearly for a complete physical exam, and sooner as needed for sick visits

## 2024-08-22 NOTE — PROGRESS NOTES
"Subjective   Patient ID: Maribel Amador is a 38 y.o. female who presents for No chief complaint on file..    HPI   Doing OK since last in. Many appointments with specialists.    Re: remote CVA - see neuro and hematology notes. Mild R sided weakness. On ASA 81. Workup for hypercoagulable state was negative.  Stable.    Re: osteopenia/osteoporosis - see rheum notes. Completed Tymlos. Starting ibandronate. DEXA is current.    Re: sleep/anxiety - see integrative medicine note. Has upcoming sleep study.    Re: HM - CRS not yet indicated. Mamm due at age 39. Has upcoming follow up with GYN.     PMHx, FHx, Social Hx, Surg Hx personally reviewed at this appointment. No pertinent findings and/or changes from prior (if applicable).    ROS: Denies wt gain/loss f/c HA LoC CP SOB NVDC. See HPI above, and scanned sheet (if applicable). All other systems are reviewed and are without complaint.   Review of Systems    Objective   /71   Pulse 77   Temp 36.1 °C (97 °F)   Ht 1.518 m (4' 11.75\")   Wt 51.7 kg (114 lb)   SpO2 98%   BMI 22.45 kg/m²     Physical Exam  Gen: well developed in NAD. AAO x3.  HEENT: NC/AT. Anicteric sclera, symmetric pupils. MMM no thrush.  Neck: Soft, supple. No LAD. No goiter.   CV: RRR nl s1s2 no m/r/g  Pulm: CTAB no w/r/r, good air exchange  GI: soft NTND BS+ no hsm  Ext: WWP no edema  Neuro: II-XII grossly intact, mildly brisker reflexes R side compared to L.   MSK: 5/5 LLE. 4/5 RLE, no tenderness of hip on palpation. Well healed surgical scars bilaterally  Gait: normal    Lab Results   Component Value Date    WBC 6.6 05/23/2024    HGB 14.7 05/23/2024    HCT 44.4 05/23/2024     05/23/2024    CHOL 200 (H) 09/27/2023    TRIG 127 09/27/2023    HDL 60.4 09/27/2023    ALT 31 07/18/2024    AST 23 07/18/2024     07/18/2024    K 4.3 07/18/2024     07/18/2024    CREATININE 0.71 07/18/2024    BUN 17 07/18/2024    CO2 27 07/18/2024    TSH 0.99 01/17/2024    INR 1.1 09/27/2023 "     par    XR DEXA bone density  Narrative: Interpreted By:  Josette Sams,   STUDY:  DEXA BONE DENSITY6/28/2024 3:30 pm      INDICATION:  Signs/Symptoms:OP.  The patient is a 39 y/o  year old F.      COMPARISON:  05/17/2022      ACCESSION NUMBER(S):  [id number]      ORDERING CLINICIAN:  NIMESH PARK      TECHNIQUE:  DEXA BONE DENSITY      FINDINGS:  SPINE L1-L4  Bone Mineral Density: 1.062  T-Score -1.0  Z-Score -1.0  Bone Mineral Density change vs baseline:  0.185  Bone Mineral Density change vs previous: 0.185          LEFT FOREARM  Total  Bone Mineral Density: 0.553  T-Score -2.1  Z-Score -2.1  UD  Bone Mineral Density: 0.370  T-Score -2.2 Z-Score -2.2  MID  Bone Mineral Density: Not reported  T-Score Not reported Z-Score Not reported  1/3  Bone Mineral Density: 0.726  T-Score -1.8   Z-Score -1.8  Bone Mineral Density change vs baseline:  0.047  Bone Mineral Density change vs previous:  0.047              World Health Organization (WHO) criteria for post-menopausal,   Women:  Normal:         T-score at or above -1 SD  Osteopenia:   T-score between -1 and -2.5 SD  Osteoporosis: T-score at or below -2.5 SD          10-year Fracture Risk:  Major Osteoporotic Fracture  Not reported  Hip Fracture                        Not reported      Note:  If no FRAX score is reported, it is because:  Some T-score for Spine Total or Hip Total or Femoral Neck at or below  -2.5      This exam was performed at [facility] on a GE Lunar Prodigy Advance Dexa Unit.          Impression: DEXA:  According to World Health Organization criteria,  classification is low bone mass (osteopenia)      Followup recommended in two years or sooner as clinically warranted.      All images and detailed analysis are available on the  Radiology  PACS.      MACRO:  None      Signed by: Josette Sams 7/1/2024 3:23 PM  Dictation workstation:   [id number]      Current Outpatient Medications   Medication  Instructions    alendronate (FOSAMAX) 70 mg, oral, Every 7 days, Take on an empty stomach. Do not lie down or eat for 1/2 hour after taking.    aspirin 81 mg, oral, Daily    calcium carbonate-vitamin D3 1,000 mg-20 mcg (800 unit) tablet oral    cyanocobalamin, vitamin B-12, (VITAMIN B-12 ORAL) oral    ferrous fumarate-vitamin C (Lucia-Sequeles 65-25) 1 tablet, oral, 3 times daily (morning, midday, late afternoon), Do not crush, chew, or split.    hydrOXYzine HCL (ATARAX) 10 mg, oral, Every 6 hours PRN    ibandronate (BONIVA) 150 mg, oral, Every 30 days, Take on an empty stomach. Do not lie down or eat for 1/2 hour after taking.    TURMERIC ORAL oral    vitamin k 100 mcg, oral, Daily        Assessment/Plan   # h/o CVA: monitor     # h/o osteoporosis, now osteopenic  - continue Tymlos  - follow up rheum     # Hip pain  - x-rays reasonable given her hardware and increased pain despite conservative tx     # Anxiety, Sleep issues  - follow up [facility]      # Health Maintenance  - routine blood work  - Colon Cancer Screening: Not yet indicated   - Mammogram: age 39  - DEXA: current  - Immunizations: flu shot and COVID booster this fall

## 2024-08-23 ENCOUNTER — LAB (OUTPATIENT)
Dept: LAB | Facility: LAB | Age: 39
End: 2024-08-23
Payer: COMMERCIAL

## 2024-08-23 DIAGNOSIS — Z00.00 HEALTHCARE MAINTENANCE: ICD-10-CM

## 2024-08-23 DIAGNOSIS — E78.2 MODERATE MIXED HYPERLIPIDEMIA NOT REQUIRING STATIN THERAPY: ICD-10-CM

## 2024-08-23 LAB
CHOLEST SERPL-MCNC: 197 MG/DL (ref 0–199)
CHOLESTEROL/HDL RATIO: 3.4
HCV AB SER QL: NONREACTIVE
HDLC SERPL-MCNC: 58.2 MG/DL
HIV 1+2 AB+HIV1 P24 AG SERPL QL IA: NONREACTIVE
LDLC SERPL CALC-MCNC: 117 MG/DL
NON HDL CHOLESTEROL: 139 MG/DL (ref 0–149)
TRIGL SERPL-MCNC: 111 MG/DL (ref 0–149)
VLDL: 22 MG/DL (ref 0–40)

## 2024-08-23 PROCEDURE — 86803 HEPATITIS C AB TEST: CPT

## 2024-08-23 PROCEDURE — 80061 LIPID PANEL: CPT

## 2024-08-23 PROCEDURE — 87389 HIV-1 AG W/HIV-1&-2 AB AG IA: CPT

## 2024-08-23 PROCEDURE — 36415 COLL VENOUS BLD VENIPUNCTURE: CPT

## 2024-08-27 ENCOUNTER — APPOINTMENT (OUTPATIENT)
Dept: INTEGRATIVE MEDICINE | Facility: CLINIC | Age: 39
End: 2024-08-27
Payer: COMMERCIAL

## 2024-08-27 DIAGNOSIS — M81.8 IDIOPATHIC OSTEOPOROSIS: ICD-10-CM

## 2024-08-27 DIAGNOSIS — F41.9 ANXIETY: Primary | ICD-10-CM

## 2024-08-27 PROCEDURE — 97811 ACUP 1/> W/O ESTIM EA ADD 15: CPT | Performed by: NATUROPATH

## 2024-08-27 PROCEDURE — 97810 ACUP 1/> WO ESTIM 1ST 15 MIN: CPT | Performed by: NATUROPATH

## 2024-08-27 ASSESSMENT — ENCOUNTER SYMPTOMS
PALPITATIONS: 0
WHEEZING: 0
CONSTIPATION: 0
COUGH: 0
FEVER: 0
CHILLS: 0
DIFFICULTY URINATING: 0
TROUBLE SWALLOWING: 0
RHINORRHEA: 0
VOMITING: 0
HEADACHES: 1
SHORTNESS OF BREATH: 0
ARTHRALGIAS: 1
NAUSEA: 0

## 2024-08-27 NOTE — PROGRESS NOTES
Acupuncture Visit:     Subjective   Patient ID: Maribel Amador is a 38 y.o. female who presents for No chief complaint on file.  Sleep has been more consistety  Anxiety ahs been stable.   Started on Boniva today, had bad SE with previous meds  Energy good        Previous  Feeling well  4 days of sleep was off, more anxiety since  Has reduced back to normal recently.   No issues with lavela.     Energy good  BM normal            Feels more relaxed overall  Had one episode of more anxiety, was able to clear quickly  Started on Lavela, no issues  Sleep- A  lot better, still waking occ in middle of night, able to get back to sleep;     Energy- good.     BM good.     Started fosamax, had significant bone josefina muscle pain, had to stop. Has FU with PCP for dosing changes            Initial:  Anxiety-  random can wake middle of night  May happen during the day  Can go away quickly or hang for a week  Can feel heart racing, feels sweaty, no nausea, some HA  No diarrhea.   Deep breathing, biking, walking, reading,   Cooking  Tried ashwagandha with no benefit  Was Rx hydroxyzine, but takes rarely PRN      Sleep- tried to relax 8-10p, not always falling asleep easily. Wakes around 4-430, sometimes falls back asleep.   No meds for sleep    Energy- no issues    Hx of stroke at 14,   Broke both hips from running, dx with osteoporosis. , some pain from surgeries from screws. Occ numbness in right leg to foot post surgery, comes and goes.     Diet:   B: oatmeal, eggs, pb toast, banana  eggs avocado, milk,   L: salad, chickpeas, avocoado,   D; salmon, chicken  Sn: smoothie, yogurt, berries  Dr: water, coffee, latte, tea    BM: daily, well formed.                 Session Information  Is this acupuncture treatment being billed to the patient's insurance company: Yes  This is visit number: 4  The patient has a total number of visits of: 20  Initial Acupuncture Treatment date: 07/03/24  Name of Insurance Company:   Visit Type:  Follow-up visit  Medical History Reviewed: I have reviewed pertinent medical history in EHR, and no contraindications are present to provide treatment         Review of Systems   Constitutional:  Negative for chills and fever.   HENT:  Positive for tinnitus (has seen EENT, left is worse). Negative for congestion, ear pain, rhinorrhea and trouble swallowing.    Eyes:  Negative for visual disturbance.   Respiratory:  Negative for cough, shortness of breath and wheezing.    Cardiovascular:  Negative for chest pain and palpitations.   Gastrointestinal:  Negative for constipation, nausea and vomiting.   Genitourinary:  Negative for difficulty urinating and menstrual problem.   Musculoskeletal:  Positive for arthralgias (elbows and hands,).   Skin:  Negative for rash.   Neurological:  Positive for headaches (occ with cycle.).            Provider reviewed plan for the acupuncture session, precautions and contraindications. Patient/guardian/hospital staff has given consent to treat with full understanding of what to expect during the session. Before acupuncture began, provider explained to the patient to communicate at any time if the procedure was causing discomfort past their tolerance level. Patient agreed to advise acupuncturist. The acupuncturist counseled the patient on the risks of acupuncture treatment including pain, infection, bleeding, and no relief of pain. The patient was positioned comfortably. There was no evidence of infection at the site of needle insertions.    Objective   Physical Exam         Row Name 07/03/24 0819   Acupuncture Treatment   Body Points - Bilateral Jina baker may, yin tong, Li4, HT7, SP6, KI6, LR3   Auricular Points Yes   Auricular Points - Bilateral Baker men, PT 0   Other Techniques Utilized TDP Lamp   TDP Lamp Descripton feet   Needle Count In 19   Needle Count Out 19   Needle Retention Time (min) 25 minutes   Total Face to Face Time (min) 45 minutes                          Assessment/Plan   Diagnoses and all orders for this visit:  Anxiety (Primary)  Idiopathic osteoporosis            Supplement recommendations:  Lavela WS 1265 (60 Softgels) ([employer]): Please take  1 gel, twice / day  ongoing (with a full glass of water. If you get lavender burps and they are bothersome, you can but the whole package in the freezer and take the capsules from there. )

## 2024-08-28 ENCOUNTER — TELEPHONE (OUTPATIENT)
Dept: RHEUMATOLOGY | Facility: CLINIC | Age: 39
End: 2024-08-28
Payer: COMMERCIAL

## 2024-08-28 NOTE — TELEPHONE ENCOUNTER
Patient called today and is having side effects from taking the Boniva yesterday. She states that she is having really bad severe spine pain. Please call her to discuss at [phone].

## 2024-08-28 NOTE — TELEPHONE ENCOUNTER
Return call placed to patient to further discuss pain experienced. Pt states that her pain is going from bad to worse in comparison to when she was on Fosamax. Pt states that the pain comes and goes and is centralized to spine area. She states that she cannot bend down to touch toes at all. She states she has tried Motrin this morning and will take second dose this afternoon, at the direction of previous provider, but has not had any relief as of yet. She is also using Icy Hot to the area, alternating with heating pad. This nurse to forward to provider for additional instruction and [place] back to patient.

## 2024-09-03 ENCOUNTER — APPOINTMENT (OUTPATIENT)
Dept: INTEGRATIVE MEDICINE | Facility: CLINIC | Age: 39
End: 2024-09-03
Payer: COMMERCIAL

## 2024-09-03 VITALS
DIASTOLIC BLOOD PRESSURE: 64 MMHG | BODY MASS INDEX: 22.38 KG/M2 | SYSTOLIC BLOOD PRESSURE: 97 MMHG | OXYGEN SATURATION: 99 % | HEART RATE: 82 BPM | WEIGHT: 111 LBS | HEIGHT: 59 IN

## 2024-09-03 DIAGNOSIS — E53.8 VITAMIN B12 DEFICIENCY: ICD-10-CM

## 2024-09-03 DIAGNOSIS — M81.0 OSTEOPOROSIS, UNSPECIFIED OSTEOPOROSIS TYPE, UNSPECIFIED PATHOLOGICAL FRACTURE PRESENCE: Primary | ICD-10-CM

## 2024-09-03 DIAGNOSIS — M54.9 UPPER BACK PAIN ON RIGHT SIDE: ICD-10-CM

## 2024-09-03 DIAGNOSIS — M84.353S STRESS FRACTURE OF NECK OF FEMUR, SEQUELA: ICD-10-CM

## 2024-09-03 PROBLEM — E55.9 VITAMIN D DEFICIENCY: Status: ACTIVE | Noted: 2024-09-03

## 2024-09-03 PROCEDURE — 99215 OFFICE O/P EST HI 40 MIN: CPT | Performed by: INTERNAL MEDICINE

## 2024-09-03 ASSESSMENT — ENCOUNTER SYMPTOMS
NUMBNESS: 1
ARTHRALGIAS: 1
ABDOMINAL PAIN: 0
DIARRHEA: 0
SLEEP DISTURBANCE: 1
NERVOUS/ANXIOUS: 1
FATIGUE: 0
CONSTIPATION: 0
COUGH: 0
BACK PAIN: 0
UNEXPECTED WEIGHT CHANGE: 0
FREQUENCY: 0
ABDOMINAL DISTENTION: 0
DIZZINESS: 1

## 2024-09-03 NOTE — PROGRESS NOTES
"Integrative Medicine Follow-up Visit :     Subjective   Patient ID: Maribel Amador is a 38 y.o. female who presents for Follow-up       HPI  Osteoporosis: Had boniva and fosamax caused severe pain so she had to stop.   She is doing the acupuncture and this has helped her anxiety.      Taking the b12. No side effects.   Sleep has been ok except for the last week after taking boniva. Pain located only in right upper back near shoulder. Has been trying stretching and this reduces the pain somewhat. Happened after both fosamax and boniva.        Pain:3/10 right upper back. Improved with stretching here in the office.     Review of Systems   Constitutional:  Negative for fatigue and unexpected weight change.   HENT:  Negative for congestion.    Respiratory:  Negative for cough.    Gastrointestinal:  Negative for abdominal distention, abdominal pain, constipation and diarrhea.   Genitourinary:  Negative for frequency and menstrual problem.        Had difficulty getting pregnant. Almost gave up. Took several years to get pregnant. No miscarriages   Musculoskeletal:  Positive for arthralgias (residual hip pain). Negative for back pain.   Neurological:  Positive for dizziness (not as bad) and numbness (right numbness down right leg sometimes).        Short term memory issues- has trouble with words sometimes.  Can't remember where she put something sometimes.    Psychiatric/Behavioral:  Positive for sleep disturbance (wake in middle of the night with sweats). Negative for suicidal ideas. The patient is nervous/anxious.                   Objective   BP 97/64 (BP Location: Left arm, Patient Position: Sitting, BP Cuff Size: Adult)   Pulse 82   Ht 1.499 m (4' 11\")   Wt 50.3 kg (111 lb)   SpO2 99%   BMI 22.42 kg/m²     Physical Exam  HENT:      Head: Normocephalic and atraumatic.      Mouth/Throat:      Mouth: Mucous membranes are moist.   Cardiovascular:      Rate and Rhythm: Normal rate.   Pulmonary:      Effort: " Pulmonary effort is normal. No respiratory distress.   Musculoskeletal:         General: Tenderness (right upper back pain under shoulder, imporved with stretching) present. No swelling or deformity.      Cervical back: Normal range of motion.   Skin:     General: Skin is dry.      Findings: No rash.   Neurological:      General: No focal deficit present.      Mental Status: She is alert and oriented to person, place, and time.   Psychiatric:      Comments: Normal affect                      Assessment/Plan     Problem List Items Addressed This Visit             ICD-10-CM    Stress fracture of neck of femur M84.353A    Relevant Orders    Referral to Physical Therapy    Osteoporosis - Primary M81.0     Suggest alkaline diet with plenty of greens, vegetables, fruits, whole grains, beans.   Refer to PT for strength and balance training  Keep vitamin D level between 40-80 ng/ml.   Consume adequate protein- favor plant based because less acidic           Relevant Orders    Referral to Physical Therapy    Vitamin B12 deficiency E53.8     Had elevated homocysteine and hx of stroke. Suggest must keep b12 above 700 pg/ml to see if homocysteine can normalize.          Relevant Orders    Homocysteine, serum    Vitamin B12    Upper back pain on right side M54.9     Try stretches we reviewed in office. Discuss pain with acupuncturist to see if acupuncture could help.               Recommend Follow up in : 3 months    Silvana Andrade MD PhD    Time Spent  Prep time on day of patient encounter: 5 minutes  Time spent directly with patient, family or caregiver: 35 minutes  Additional Time Spent on Patient Care Activities: 0 minutes  Documentation Time: 5 minutes  Other Time Spent: 0 minutes  Total: 45 minutes

## 2024-09-03 NOTE — TELEPHONE ENCOUNTER
Call placed to patient after discussing with provider, Dr. Martins. Unable to establish direct contact and voicemail left. This nurse communicated that she is to discontinue Boniva and make a sooner  appointment with Dr. Martins. She was also instructed to  drink lots of  fluids, and to take an extra calcium and vitamin D 1000 units per day. She could take OTC NSAIDS for pain. Pt was left direct number for this nurse to call and schedule. Will place secondary call tomorrow should this nurse not get scheduled by end of day this shift.

## 2024-09-03 NOTE — ASSESSMENT & PLAN NOTE
Continue acupuncture  Increase physical activity  Increase folate in the diet.   Continue to work on sleep.

## 2024-09-03 NOTE — ASSESSMENT & PLAN NOTE
Try stretches we reviewed in office. Discuss pain with acupuncturist to see if acupuncture could help.

## 2024-09-03 NOTE — ASSESSMENT & PLAN NOTE
Suggest alkaline diet with plenty of greens, vegetables, fruits, whole grains, beans.   Refer to PT for strength and balance training  Keep vitamin D level between 40-80 ng/ml.   Consume adequate protein- favor plant based because less acidic

## 2024-09-03 NOTE — PATIENT INSTRUCTIONS
Eat foods high in folate such as beans, lentils, nuts, seeds, dark green vegetables, orange fruits and vegetables  See Physical therapy for osteoporosis.  Continue vitamin b12 but can take in the morning.   Consider switching your milk to non gmo soy milk.   Continue to take the vitamin D 2000 International Units by mouth daily.   Almonds help prevent break down of bone. Suggest 2-3 servings a week.   Keep eating the prunes.   Limit saturated fat in cheese, lunch meat, breakfast meat  Aim to get 30 grams of fiber in your diet daily.   Stop the b12 for three days before the blood test.   Try the shoulder stretch 3-4 times per day for ten reps at a time.   Plant You by Jessy Pizano is a cookbook with recipes that are very alkaline. She also has a website you can look at and try her recipes before buying the book.     Follow up in December 2024  Silvana Andrade MD PhD

## 2024-09-03 NOTE — ASSESSMENT & PLAN NOTE
Had elevated homocysteine and hx of stroke. Suggest must keep b12 above 700 pg/ml to see if homocysteine can normalize.

## 2024-09-06 ENCOUNTER — APPOINTMENT (OUTPATIENT)
Dept: INTEGRATIVE MEDICINE | Facility: CLINIC | Age: 39
End: 2024-09-06
Payer: COMMERCIAL

## 2024-09-06 DIAGNOSIS — M81.0 OSTEOPOROSIS, UNSPECIFIED OSTEOPOROSIS TYPE, UNSPECIFIED PATHOLOGICAL FRACTURE PRESENCE: ICD-10-CM

## 2024-09-06 DIAGNOSIS — F41.9 ANXIETY: Primary | ICD-10-CM

## 2024-09-06 ASSESSMENT — ENCOUNTER SYMPTOMS
TROUBLE SWALLOWING: 0
RHINORRHEA: 0
HEADACHES: 1
NAUSEA: 0
SHORTNESS OF BREATH: 0
ARTHRALGIAS: 1
CHILLS: 0
VOMITING: 0
FEVER: 0
CONSTIPATION: 0
WHEEZING: 0
COUGH: 0
PALPITATIONS: 0
DIFFICULTY URINATING: 0

## 2024-09-06 NOTE — PROGRESS NOTES
Acupuncture Visit:     Subjective   Patient ID: Mraibel Amador is a 38 y.o. female who presents for No chief complaint on file.  Had same SE with boniva, pain in neck mid back  Elbows, shoulder  Pain reduced since stopping  Anxiety still improved  Sleep- hard due to SE, pain when laying        Sleep has been more consistety  Anxiety ahs been stable.   Started on Boniva today, had bad SE with previous meds  Energy good        Previous  Feeling well  4 days of sleep was off, more anxiety since  Has reduced back to normal recently.   No issues with lavela.     Energy good  BM normal            Feels more relaxed overall  Had one episode of more anxiety, was able to clear quickly  Started on Lavela, no issues  Sleep- A  lot better, still waking occ in middle of night, able to get back to sleep;     Energy- good.     BM good.     Started fosamax, had significant bone josefina muscle pain, had to stop. Has FU with PCP for dosing changes            Initial:  Anxiety-  random can wake middle of night  May happen during the day  Can go away quickly or hang for a week  Can feel heart racing, feels sweaty, no nausea, some HA  No diarrhea.   Deep breathing, biking, walking, reading,   Cooking  Tried ashwagandha with no benefit  Was Rx hydroxyzine, but takes rarely PRN      Sleep- tried to relax 8-10p, not always falling asleep easily. Wakes around 4-430, sometimes falls back asleep.   No meds for sleep    Energy- no issues    Hx of stroke at 14,   Broke both hips from running, dx with osteoporosis. , some pain from surgeries from screws. Occ numbness in right leg to foot post surgery, comes and goes.     Diet:   B: oatmeal, eggs, pb toast, banana  eggs avocado, milk,   L: salad, chickpeas, avocoado,   D; salmon, chicken  Sn: smoothie, yogurt, berries  Dr: water, coffee, latte, tea    BM: daily, well formed.                 Session Information  Is this acupuncture treatment being billed to the patient's insurance company:  Yes  This is visit number: 5  The patient has a total number of visits of: 20  Initial Acupuncture Treatment date: 07/03/24  Name of Insurance Company:   Visit Type: Follow-up visit  Medical History Reviewed: I have reviewed pertinent medical history in EHR, and no contraindications are present to provide treatment         Review of Systems   Constitutional:  Negative for chills and fever.   HENT:  Positive for tinnitus (has seen EENT, left is worse). Negative for congestion, ear pain, rhinorrhea and trouble swallowing.    Eyes:  Negative for visual disturbance.   Respiratory:  Negative for cough, shortness of breath and wheezing.    Cardiovascular:  Negative for chest pain and palpitations.   Gastrointestinal:  Negative for constipation, nausea and vomiting.   Genitourinary:  Negative for difficulty urinating and menstrual problem.   Musculoskeletal:  Positive for arthralgias (elbows and hands,).   Skin:  Negative for rash.   Neurological:  Positive for headaches (occ with cycle.).            Provider reviewed plan for the acupuncture session, precautions and contraindications. Patient/guardian/hospital staff has given consent to treat with full understanding of what to expect during the session. Before acupuncture began, provider explained to the patient to communicate at any time if the procedure was causing discomfort past their tolerance level. Patient agreed to advise acupuncturist. The acupuncturist counseled the patient on the risks of acupuncture treatment including pain, infection, bleeding, and no relief of pain. The patient was positioned comfortably. There was no evidence of infection at the site of needle insertions.    Objective   Physical Exam         Row Name 07/03/24 0819   Acupuncture Treatment   Body Points - Bilateral Jina baker may, yin tong, Li4, HT7, SP6, KI6, LR3   Auricular Points Yes   Auricular Points - Bilateral BFA 1-3   Other Techniques Utilized TDP Lamp   TDP Lamp Descripton feet    Needle Count In 20   Needle Count Out 20   Needle Retention Time (min) 25 minutes   Total Face to Face Time (min) 25 minutes                         Assessment/Plan   Diagnoses and all orders for this visit:  Anxiety (Primary)  Osteoporosis, unspecified osteoporosis type, unspecified pathological fracture presence              Supplement recommendations:  Gina WS 1265 (60 Softgels) ([employer]): Please take  1 gel, twice / day  ongoing (with a full glass of water. If you get lavender burps and they are bothersome, you can but the whole package in the freezer and take the capsules from there. )

## 2024-09-11 ENCOUNTER — APPOINTMENT (OUTPATIENT)
Dept: RHEUMATOLOGY | Facility: CLINIC | Age: 39
End: 2024-09-11
Payer: COMMERCIAL

## 2024-09-11 VITALS
WEIGHT: 115 LBS | BODY MASS INDEX: 23.18 KG/M2 | OXYGEN SATURATION: 98 % | SYSTOLIC BLOOD PRESSURE: 100 MMHG | TEMPERATURE: 98 F | HEIGHT: 59 IN | HEART RATE: 67 BPM | DIASTOLIC BLOOD PRESSURE: 62 MMHG

## 2024-09-11 DIAGNOSIS — M81.8 OTHER OSTEOPOROSIS, UNSPECIFIED PATHOLOGICAL FRACTURE PRESENCE: Primary | ICD-10-CM

## 2024-09-11 PROCEDURE — 1036F TOBACCO NON-USER: CPT | Performed by: INTERNAL MEDICINE

## 2024-09-11 PROCEDURE — 99213 OFFICE O/P EST LOW 20 MIN: CPT | Performed by: INTERNAL MEDICINE

## 2024-09-11 PROCEDURE — 3008F BODY MASS INDEX DOCD: CPT | Performed by: INTERNAL MEDICINE

## 2024-09-11 NOTE — PROGRESS NOTES
Recall  Ms. Amador is a 36-year-old  female with a diagnosis of osteoporosis, recent bilateral femoral fractures s/p fixation, CVA at the age of 14 with subsequent diagnosis of protein C deficiency, right upper extremity DVT, femoral acetabular impingement and eosinophilia who presents for follow-up. The patient has developed bilateral stress fractures of the femoral neck s/p fixation by orthopedic surgery. DEXA scan was done in May 2022 and showed osteoporosis.   -DEXA in May 2022 showed T-score 2.5 of L1-L4 (Z-score of -1.9, L. forearm radius -2.3 (Z-score of -2.3)     Tymlos started in 7/22 and 5/204     She is RF and antI-CCP antibody negative. CRP was normal. CONRAD and anti-cardiolipin antibodies negative    Chief Complaint: Follow up of OP      HPI: At today's visit, the patient reports starting Fosamax after completing Tymlos but noticed bone pain and discontinued it. She switched to Boniva and noticed similar pain. The symptoms subsided after she quit the medication.   Her repeat DXA is showing lowest T-score -1.8 with BMD of 0.726 int he distal 3rd of radius.         Review of Systems  Constitutional: No fatigue  Skin: No rash but Raynaud's  Head: No headache, oral ulcers or hair loss  Neck: No difficulty swallowing or choking  Eyes: Has dry eyes  Mouth: No dry mouth or oral ulcers  Pulmonary: No wheezing, pleurisy or SOB  Cardiovascular: No chest pain or palpitations or dizziness  Gastrointestinal: No abdominal pain, nausea or blood in stool  Musculoskeletal: As H/P        Active Problems  Problems    · Abnormal FSH level (259.9) (E34.9)   · Abnormal LH level (259.9) (E34.9)   · Acute hip pain (719.45) (M25.559)   · Acute lumbar radiculopathy (724.4) (M54.16)   · Arthritis (716.90) (M19.90)   · Bronchitis, acute (466.0) (J20.9)   · Encounter for hearing examination without abnormal findings (V72.19) (Z01.10)   · Encounter for routine gynecological examination (V72.31) (Z01.419)   · Added by  Problem List Migration; 2013-7-21; Moved to [facility] Nov 29 2013 9:11PM   · Eye irritation (379.99) (H57.89)   · Eye problems (V41.1) (H57.9)   · Finger injury, initial encounter (959.5) (S69.90XA)   · Hand arthritis (716.94) (M19.049)   · High risk medication use (V58.69) (Z79.899)   · Hip flexor tendinitis (727.09) (M76.899)   · Hypercoagulation syndrome (289.81) (D68.59)   · Impacted cerumen of both ears (380.4) (H61.23)   · Infected cat bite (879.9,E906.3) (W55.01XA)   · Left hip pain (719.45) (M25.552)   · Muscle contracture (728.85) (M62.40)   · Neck pain, chronic (723.1,338.29) (M54.2,G89.29)   · Onychomycosis (110.1) (B35.1)   · Orthopedic aftercare (V54.9) (Z47.89)   · Osteopenia (733.90) (M85.80)   · Osteoporosis, idiopathic (733.02) (M81.8)   · Pain in femur (733.90) (M89.8X5)   · Right hip pain (719.45) (M25.551)   · Right shoulder pain (719.41) (M25.511)   · Stress fracture of neck of left femur (733.96) (M84.352A)   · Stress fracture of neck of right femur (733.96) (M84.351A)   · Tinnitus of left ear (388.30) (H93.12)   · TMJ crepitus (524.64) (M26.69)     Past Medical History  Problems    · History of CVA (cerebral infarction) (434.91) (I63.9)   · History of Right hip pain (719.45) (M25.551)   · Resolved Date: 23 May 2021     Family History  Mother    · Family history of Carcinoma of breast, unspecified laterality   · menopausal   · Family history of essential hypertension (V17.49) (Z82.49)  Father    · Family history of diabetes mellitus (V18.0) (Z83.3)  Maternal Aunt    · Family history of Carcinoma of breast, unspecified laterality   · menopausal     Social History  Problems    · Does not use illicit drugs (V49.89) (Z78.9)   · Denied: History of domestic violence   · Never smoker   · Occasional alcohol use   · Occasionally uses seat belt     Allergies  NoKnown    · No Known Allergies   Recorded By: Chel, April; 6/26/2017 6:16:33 PM     Current Meds     Current Outpatient Medications  "  Medication Sig Dispense Refill    aspirin 81 mg EC tablet Take 1 tablet (81 mg) by mouth once daily. 30 tablet 2    calcium carbonate-vitamin D3 1,000 mg-20 mcg (800 unit) tablet Take by mouth.      cyanocobalamin, vitamin B-12, (VITAMIN B-12 ORAL) Take by mouth.      ferrous fumarate-vitamin C (Lucia-Sequeles 65-25) Take 1 tablet by mouth 3 times daily (morning, midday, late afternoon). Do not crush, chew, or split.      phytonadione, vit K1, (vitamin k) 100 mcg tablet Take 1 tablet (100 mcg) by mouth once daily.      TURMERIC ORAL Take by mouth.      hydrOXYzine HCL (Atarax) 10 mg tablet Take 1 tablet (10 mg) by mouth every 6 hours if needed for anxiety. 30 tablet 2     No current facility-administered medications for this visit.          Vitals  Blood pressure 100/62, pulse 67, temperature 36.7 °C (98 °F), temperature source Temporal, height 1.499 m (4' 11\"), weight 52.2 kg (115 lb), SpO2 98%.  Physical Exam  General - NAD, sitting up in chair, well-groomed, pleasant, AAOx3  Head: Normocephalic, atraumatic  Eyes - PERRLA, EOMI. No conjunctiva injection.   Mouth/ENT - Moist oral and nasal mucosa.  Skin - No rashes or ulcers. Skin warm and dry. No erythema on bilateral cheeks.  Extremities - No edema, cyanosis ,or clubbing  Musculoskeletal -  EXAMJOINTDETAILED,  Back: Full range of motion         ORDERING CLINICIAN:  NIMESH PARK      TECHNIQUE:  DEXA BONE DENSITY      FINDINGS:  SPINE L1-L4  Bone Mineral Density: 1.062  T-Score -1.0  Z-Score -1.0  Bone Mineral Density change vs baseline:  0.185  Bone Mineral Density change vs previous: 0.185          LEFT FOREARM  Total  Bone Mineral Density: 0.553  T-Score -2.1  Z-Score -2.1  UD  Bone Mineral Density: 0.370  T-Score -2.2 Z-Score -2.2  MID  Bone Mineral Density: Not reported  T-Score Not reported Z-Score Not reported  1/3  Bone Mineral Density: 0.726  T-Score -1.8   Z-Score -1.8  Bone Mineral Density change vs baseline:  0.047  Bone Mineral Density change vs " previous:  0.047      Assessment/plan:    38 yr old WF with Osteoporosis, most likely related to her heparin use in pregnancy.   Will repeat DXA in 2 years. Hold off treatment for now and if BMD decreases will use Prolia. Continue calcium and Vitamin D 1000 international unit  per day.     Reviewed and approved by NIMESH PARK on 9/11/24 at 8:53 AM.

## 2024-09-27 ENCOUNTER — APPOINTMENT (OUTPATIENT)
Dept: INTEGRATIVE MEDICINE | Facility: CLINIC | Age: 39
End: 2024-09-27
Payer: COMMERCIAL

## 2024-09-27 DIAGNOSIS — F41.9 ANXIETY: Primary | ICD-10-CM

## 2024-09-27 DIAGNOSIS — M81.0 OSTEOPOROSIS, UNSPECIFIED OSTEOPOROSIS TYPE, UNSPECIFIED PATHOLOGICAL FRACTURE PRESENCE: ICD-10-CM

## 2024-09-27 ASSESSMENT — ENCOUNTER SYMPTOMS
COUGH: 0
PALPITATIONS: 0
TROUBLE SWALLOWING: 0
HEADACHES: 1
SHORTNESS OF BREATH: 0
RHINORRHEA: 0
CONSTIPATION: 0
DIFFICULTY URINATING: 0
CHILLS: 0
WHEEZING: 0
FEVER: 0
NAUSEA: 0
VOMITING: 0
ARTHRALGIAS: 1

## 2024-09-27 NOTE — PROGRESS NOTES
Acupuncture Visit:     Subjective   Patient ID: Maribel Amador is a 38 y.o. female who presents for No chief complaint on file.  Bone pain is improving from medication, but still present  Worse in AM  Arms and shoulders  Anxiety- improved   Sleep- improved, better since less pain.       Previous  Had same SE with boniva, pain in neck mid back  Elbows, shoulder  Pain reduced since stopping  Anxiety still improved  Sleep- hard due to SE, pain when laying        Sleep has been more consistety  Anxiety ahs been stable.   Started on Boniva today, had bad SE with previous meds  Energy good        Previous  Feeling well  4 days of sleep was off, more anxiety since  Has reduced back to normal recently.   No issues with lavela.     Energy good  BM normal            Feels more relaxed overall  Had one episode of more anxiety, was able to clear quickly  Started on Lavela, no issues  Sleep- A  lot better, still waking occ in middle of night, able to get back to sleep;     Energy- good.     BM good.     Started fosamax, had significant bone josefina muscle pain, had to stop. Has FU with PCP for dosing changes            Initial:  Anxiety-  random can wake middle of night  May happen during the day  Can go away quickly or hang for a week  Can feel heart racing, feels sweaty, no nausea, some HA  No diarrhea.   Deep breathing, biking, walking, reading,   Cooking  Tried ashwagandha with no benefit  Was Rx hydroxyzine, but takes rarely PRN      Sleep- tried to relax 8-10p, not always falling asleep easily. Wakes around 4-430, sometimes falls back asleep.   No meds for sleep    Energy- no issues    Hx of stroke at 14,   Broke both hips from running, dx with osteoporosis. , some pain from surgeries from screws. Occ numbness in right leg to foot post surgery, comes and goes.     Diet:   B: oatmeal, eggs, pb toast, banana  eggs avocado, milk,   L: salad, chickpeas, avocoado,   D; salmon, chicken  Sn: smoothie, yogurt, berries  Dr: water,  coffee, latte, tea    BM: daily, well formed.                 Session Information  Is this acupuncture treatment being billed to the patient's insurance company: Yes  This is visit number: 6  The patient has a total number of visits of: 20  Initial Acupuncture Treatment date: 07/03/24  Name of Insurance Company: [employer]  Visit Type: Follow-up visit  Medical History Reviewed: I have reviewed pertinent medical history in EHR, and no contraindications are present to provide treatment         Review of Systems   Constitutional:  Negative for chills and fever.   HENT:  Positive for tinnitus (has seen EENT, left is worse). Negative for congestion, ear pain, rhinorrhea and trouble swallowing.    Eyes:  Negative for visual disturbance.   Respiratory:  Negative for cough, shortness of breath and wheezing.    Cardiovascular:  Negative for chest pain and palpitations.   Gastrointestinal:  Negative for constipation, nausea and vomiting.   Genitourinary:  Negative for difficulty urinating and menstrual problem.   Musculoskeletal:  Positive for arthralgias (elbows and hands,).   Skin:  Negative for rash.   Neurological:  Positive for headaches (occ with cycle.).            Provider reviewed plan for the acupuncture session, precautions and contraindications. Patient/guardian/hospital staff has given consent to treat with full understanding of what to expect during the session. Before acupuncture began, provider explained to the patient to communicate at any time if the procedure was causing discomfort past their tolerance level. Patient agreed to advise acupuncturist. The acupuncturist counseled the patient on the risks of acupuncture treatment including pain, infection, bleeding, and no relief of pain. The patient was positioned comfortably. There was no evidence of infection at the site of needle insertions.    Objective   Physical Exam         Row Name 07/03/24 0819   Acupuncture Treatment   Body Points - Bilateral Jina olvera,  yin tong, Li4, HT7, SP6, KI6, LR3   Auricular Points Yes   Auricular Points - Bilateral BFA 1-3   Other Techniques Utilized TDP Lamp   TDP Lamp Descripton feet   Needle Count In 20   Needle Count Out 20   Needle Retention Time (min) 25 minutes   Total Face to Face Time (min) 25 minutes                         Assessment/Plan   Diagnoses and all orders for this visit:  Anxiety (Primary)  Osteoporosis, unspecified osteoporosis type, unspecified pathological fracture presence                Supplement recommendations:  Lavela WS 1265 (60 Softgels) ([employer]): Please take  1 gel, twice / day  ongoing (with a full glass of water. If you get lavender burps and they are bothersome, you can but the whole package in the freezer and take the capsules from there. )

## 2024-10-02 ENCOUNTER — TELEPHONE (OUTPATIENT)
Dept: OBSTETRICS AND GYNECOLOGY | Facility: CLINIC | Age: 39
End: 2024-10-02
Payer: COMMERCIAL

## 2024-10-02 DIAGNOSIS — Z01.818 PRE-OP TESTING: ICD-10-CM

## 2024-10-02 NOTE — TELEPHONE ENCOUNTER
"----- Message from Davida GARCIA sent at 10/2/2024  2:06 PM EDT -----  Regarding: CALLUM Spicer has a procedure at [facility] 11-15-24 with attached patient  FYI DR Spicer has a procedure at [facility] 11-15-24 with attached patient    Letters stating \"Getting Ready For Surgery\", \"Gynecologic Surgery: What to expect\" and the lab locations have been emailed to the patient today.     [place], Pre-OP and or labs can now be ordered if you havent already done so, Thank you, Davida    10/2/24 1420 Pt is scheduled to have a Salpingectomy Laparoscopy. Have pre-op labs been ordered yet? Will call pt to review instructions.     10/3/24 LVMTCB. Orders for CBC and T&S to be done 11/12/24 placed in chart.     10/8/24 0940 Pt is aware of New OR date of 11/15/24 and to have labs completed 11/12/24. She understands pre-op inst NPO/ERAS,  needed and that OR will call her with an arrival time. Advised to stop ASA 7 days prior to procedure. Pt will send FMLA forms to office.   "

## 2024-10-11 ENCOUNTER — APPOINTMENT (OUTPATIENT)
Dept: INTEGRATIVE MEDICINE | Facility: CLINIC | Age: 39
End: 2024-10-11
Payer: COMMERCIAL

## 2024-10-11 DIAGNOSIS — F41.9 ANXIETY: Primary | ICD-10-CM

## 2024-10-11 DIAGNOSIS — M81.0 OSTEOPOROSIS, UNSPECIFIED OSTEOPOROSIS TYPE, UNSPECIFIED PATHOLOGICAL FRACTURE PRESENCE: ICD-10-CM

## 2024-10-11 PROCEDURE — 97811 ACUP 1/> W/O ESTIM EA ADD 15: CPT | Performed by: NATUROPATH

## 2024-10-11 PROCEDURE — 97810 ACUP 1/> WO ESTIM 1ST 15 MIN: CPT | Performed by: NATUROPATH

## 2024-10-11 ASSESSMENT — ENCOUNTER SYMPTOMS
CHILLS: 0
HEADACHES: 1
VOMITING: 0
CONSTIPATION: 0
COUGH: 0
NAUSEA: 0
SHORTNESS OF BREATH: 0
TROUBLE SWALLOWING: 0
WHEEZING: 0
FEVER: 0
PALPITATIONS: 0
RHINORRHEA: 0
ARTHRALGIAS: 1
DIFFICULTY URINATING: 0

## 2024-10-11 NOTE — PROGRESS NOTES
Acupuncture Visit:     Subjective   Patient ID: Maribel Amador is a 38 y.o. female who presents for No chief complaint on file.  Bone pain has cleared  Anxiety- low   Sleep good, off night last night  BM-    Previous  Bone pain is improving from medication, but still present  Worse in AM  Arms and shoulders  Anxiety- improved   Sleep- improved, better since less pain.       Previous  Had same SE with boniva, pain in neck mid back  Elbows, shoulder  Pain reduced since stopping  Anxiety still improved  Sleep- hard due to SE, pain when laying        Sleep has been more consistety  Anxiety ahs been stable.   Started on Boniva today, had bad SE with previous meds  Energy good        Previous  Feeling well  4 days of sleep was off, more anxiety since  Has reduced back to normal recently.   No issues with lavela.     Energy good  BM normal            Feels more relaxed overall  Had one episode of more anxiety, was able to clear quickly  Started on Lavela, no issues  Sleep- A  lot better, still waking occ in middle of night, able to get back to sleep;     Energy- good.     BM good.     Started fosamax, had significant bone josefina muscle pain, had to stop. Has FU with PCP for dosing changes            Initial:  Anxiety-  random can wake middle of night  May happen during the day  Can go away quickly or hang for a week  Can feel heart racing, feels sweaty, no nausea, some HA  No diarrhea.   Deep breathing, biking, walking, reading,   Cooking  Tried ashwagandha with no benefit  Was Rx hydroxyzine, but takes rarely PRN      Sleep- tried to relax 8-10p, not always falling asleep easily. Wakes around 4-430, sometimes falls back asleep.   No meds for sleep    Energy- no issues    Hx of stroke at 14,   Broke both hips from running, dx with osteoporosis. , some pain from surgeries from screws. Occ numbness in right leg to foot post surgery, comes and goes.     Diet:   B: oatmeal, eggs, pb toast, banana  eggs avocado, milk,   L:  pierre manzano avocoado,   WATSON; salmon, chicken  Sn: smoothie, yogurt, berries  Dr: water, coffee, latte, tea    BM: daily, well formed.                 Session Information  Is this acupuncture treatment being billed to the patient's insurance company: Yes  This is visit number: 7  The patient has a total number of visits of: 20  Initial Acupuncture Treatment date: 07/03/24  Name of Insurance Company:   Visit Type: Follow-up visit  Medical History Reviewed: I have reviewed pertinent medical history in EHR, and no contraindications are present to provide treatment         Review of Systems   Constitutional:  Negative for chills and fever.   HENT:  Positive for tinnitus (has seen EENT, left is worse). Negative for congestion, ear pain, rhinorrhea and trouble swallowing.    Eyes:  Negative for visual disturbance.   Respiratory:  Negative for cough, shortness of breath and wheezing.    Cardiovascular:  Negative for chest pain and palpitations.   Gastrointestinal:  Negative for constipation, nausea and vomiting.   Genitourinary:  Negative for difficulty urinating and menstrual problem.   Musculoskeletal:  Positive for arthralgias (elbows and hands,).   Skin:  Negative for rash.   Neurological:  Positive for headaches (occ with cycle.).            Provider reviewed plan for the acupuncture session, precautions and contraindications. Patient/guardian/hospital staff has given consent to treat with full understanding of what to expect during the session. Before acupuncture began, provider explained to the patient to communicate at any time if the procedure was causing discomfort past their tolerance level. Patient agreed to advise acupuncturist. The acupuncturist counseled the patient on the risks of acupuncture treatment including pain, infection, bleeding, and no relief of pain. The patient was positioned comfortably. There was no evidence of infection at the site of needle insertions.    Objective   Physical Exam          Row Name 07/03/24 0819   Acupuncture Treatment   Body Points - Bilateral Jina olivia olvera, yin tong, Li4, HT7, SP6, KI6, LR3   Auricular Points Yes   Auricular Points - Bilateral BFA 1-3   Other Techniques Utilized TDP Lamp   TDP Lamp Descripton feet   Needle Count In 20   Needle Count Out 20   Needle Retention Time (min) 25 minutes   Total Face to Face Time (min) 25 minutes                         Assessment/Plan   Diagnoses and all orders for this visit:  Anxiety (Primary)  Osteoporosis, unspecified osteoporosis type, unspecified pathological fracture presence                  Supplement recommendations:  Lavela WS 1265 (60 Softgels) ([employer]): Please take  1 gel, twice / day  ongoing (with a full glass of water. If you get lavender burps and they are bothersome, you can but the whole package in the freezer and take the capsules from there. )

## 2024-10-18 ENCOUNTER — APPOINTMENT (OUTPATIENT)
Dept: INTEGRATIVE MEDICINE | Facility: CLINIC | Age: 39
End: 2024-10-18
Payer: COMMERCIAL

## 2024-10-18 DIAGNOSIS — F41.9 ANXIETY: Primary | ICD-10-CM

## 2024-10-18 DIAGNOSIS — M81.0 OSTEOPOROSIS, UNSPECIFIED OSTEOPOROSIS TYPE, UNSPECIFIED PATHOLOGICAL FRACTURE PRESENCE: ICD-10-CM

## 2024-10-18 PROCEDURE — 97810 ACUP 1/> WO ESTIM 1ST 15 MIN: CPT | Performed by: NATUROPATH

## 2024-10-18 PROCEDURE — 97811 ACUP 1/> W/O ESTIM EA ADD 15: CPT | Performed by: NATUROPATH

## 2024-10-18 ASSESSMENT — ENCOUNTER SYMPTOMS
ARTHRALGIAS: 1
RHINORRHEA: 0
DIFFICULTY URINATING: 0
WHEEZING: 0
COUGH: 0
FEVER: 0
TROUBLE SWALLOWING: 0
VOMITING: 0
PALPITATIONS: 0
HEADACHES: 1
NAUSEA: 0
CHILLS: 0
CONSTIPATION: 0
SHORTNESS OF BREATH: 0

## 2024-10-18 NOTE — PROGRESS NOTES
Acupuncture Visit:     Subjective   Patient ID: Maribel Amador is a 38 y.o. female who presents for No chief complaint on file.  Sleep off the past few nights. Hard time staying asleep  Bone pain gone  Anxiety- OK  BM- normal.       Previous  Bone pain has cleared  Anxiety- low   Sleep good, off night last night  BM-    Previous  Bone pain is improving from medication, but still present  Worse in AM  Arms and shoulders  Anxiety- improved   Sleep- improved, better since less pain.       Previous  Had same SE with boniva, pain in neck mid back  Elbows, shoulder  Pain reduced since stopping  Anxiety still improved  Sleep- hard due to SE, pain when laying        Sleep has been more consistety  Anxiety ahs been stable.   Started on Boniva today, had bad SE with previous meds  Energy good        Previous  Feeling well  4 days of sleep was off, more anxiety since  Has reduced back to normal recently.   No issues with lavela.     Energy good  BM normal            Feels more relaxed overall  Had one episode of more anxiety, was able to clear quickly  Started on Lavela, no issues  Sleep- A  lot better, still waking occ in middle of night, able to get back to sleep;     Energy- good.     BM good.     Started fosamax, had significant bone josefina muscle pain, had to stop. Has FU with PCP for dosing changes            Initial:  Anxiety-  random can wake middle of night  May happen during the day  Can go away quickly or hang for a week  Can feel heart racing, feels sweaty, no nausea, some HA  No diarrhea.   Deep breathing, biking, walking, reading,   Cooking  Tried ashwagandha with no benefit  Was Rx hydroxyzine, but takes rarely PRN      Sleep- tried to relax 8-10p, not always falling asleep easily. Wakes around 4-430, sometimes falls back asleep.   No meds for sleep    Energy- no issues    Hx of stroke at 14,   Broke both hips from running, dx with osteoporosis. , some pain from surgeries from screws. Occ numbness in right leg  to foot post surgery, comes and goes.     Diet:   B: oatmeal, eggs, pb toast, banana  eggs avocado, milk,   L: salad, chickpeas, avocoado,   D; salmon, chicken  Sn: smoothie, yogurt, berries  Dr: water, coffee, latte, tea    BM: daily, well formed.                 Session Information  Is this acupuncture treatment being billed to the patient's insurance company: Yes  This is visit number: 8  The patient has a total number of visits of: 20  Initial Acupuncture Treatment date: 07/03/24  Name of Insurance Company:   Visit Type: Follow-up visit  Medical History Reviewed: I have reviewed pertinent medical history in EHR, and no contraindications are present to provide treatment         Review of Systems   Constitutional:  Negative for chills and fever.   HENT:  Positive for tinnitus (has seen EENT, left is worse). Negative for congestion, ear pain, rhinorrhea and trouble swallowing.    Eyes:  Negative for visual disturbance.   Respiratory:  Negative for cough, shortness of breath and wheezing.    Cardiovascular:  Negative for chest pain and palpitations.   Gastrointestinal:  Negative for constipation, nausea and vomiting.   Genitourinary:  Negative for difficulty urinating and menstrual problem.   Musculoskeletal:  Positive for arthralgias (elbows and hands,).   Skin:  Negative for rash.   Neurological:  Positive for headaches (occ with cycle.).            Provider reviewed plan for the acupuncture session, precautions and contraindications. Patient/guardian/hospital staff has given consent to treat with full understanding of what to expect during the session. Before acupuncture began, provider explained to the patient to communicate at any time if the procedure was causing discomfort past their tolerance level. Patient agreed to advise acupuncturist. The acupuncturist counseled the patient on the risks of acupuncture treatment including pain, infection, bleeding, and no relief of pain. The patient was positioned  comfortably. There was no evidence of infection at the site of needle insertions.    Objective   Physical Exam         Row Name 07/03/24 0819   Acupuncture Treatment   Body Points - Bilateral Jina baker may, yin tong, Li4, HT7, SP6, KI6, LR3   Auricular Points Yes   Auricular Points - Bilateral BFA 1-3   Other Techniques Utilized TDP Lamp   TDP Lamp Descripton feet   Needle Count In 20   Needle Count Out 20   Needle Retention Time (min) 25 minutes   Total Face to Face Time (min) 25 minutes                         Assessment/Plan   Diagnoses and all orders for this visit:  Anxiety (Primary)  Osteoporosis, unspecified osteoporosis type, unspecified pathological fracture presence                    Supplement recommendations:  Lavela WS 1265 (60 Softgels) ([employer]): Please take  1 gel, twice / day  ongoing (with a full glass of water. If you get lavender burps and they are bothersome, you can but the whole package in the freezer and take the capsules from there. )

## 2024-11-08 ENCOUNTER — APPOINTMENT (OUTPATIENT)
Dept: INTEGRATIVE MEDICINE | Facility: CLINIC | Age: 39
End: 2024-11-08
Payer: COMMERCIAL

## 2024-11-08 DIAGNOSIS — M81.0 OSTEOPOROSIS, UNSPECIFIED OSTEOPOROSIS TYPE, UNSPECIFIED PATHOLOGICAL FRACTURE PRESENCE: ICD-10-CM

## 2024-11-08 DIAGNOSIS — F41.9 ANXIETY: Primary | ICD-10-CM

## 2024-11-08 PROCEDURE — 97810 ACUP 1/> WO ESTIM 1ST 15 MIN: CPT | Performed by: NATUROPATH

## 2024-11-08 PROCEDURE — 97811 ACUP 1/> W/O ESTIM EA ADD 15: CPT | Performed by: NATUROPATH

## 2024-11-08 ASSESSMENT — ENCOUNTER SYMPTOMS
CONSTIPATION: 0
SHORTNESS OF BREATH: 0
COUGH: 0
RHINORRHEA: 0
TROUBLE SWALLOWING: 0
VOMITING: 0
FEVER: 0
NAUSEA: 0
HEADACHES: 1
PALPITATIONS: 0
CHILLS: 0
DIFFICULTY URINATING: 0
ARTHRALGIAS: 1
WHEEZING: 0

## 2024-11-08 NOTE — PROGRESS NOTES
Acupuncture Visit:     Subjective   Patient ID: Maribel Amador is a 38 y.o. female who presents for No chief complaint on file.  Sleep back to normal, staying asleep   No pain today.   Anxiety- much better,  no little attacks.   BM normal        Sleep off the past few nights. Hard time staying asleep  Bone pain gone  Anxiety- OK  BM- normal.       Previous  Bone pain has cleared  Anxiety- low   Sleep good, off night last night  BM-    Previous  Bone pain is improving from medication, but still present  Worse in AM  Arms and shoulders  Anxiety- improved   Sleep- improved, better since less pain.       Previous  Had same SE with boniva, pain in neck mid back  Elbows, shoulder  Pain reduced since stopping  Anxiety still improved  Sleep- hard due to SE, pain when laying        Sleep has been more consistety  Anxiety ahs been stable.   Started on Boniva today, had bad SE with previous meds  Energy good        Previous  Feeling well  4 days of sleep was off, more anxiety since  Has reduced back to normal recently.   No issues with lavela.     Energy good  BM normal            Feels more relaxed overall  Had one episode of more anxiety, was able to clear quickly  Started on Lavela, no issues  Sleep- A  lot better, still waking occ in middle of night, able to get back to sleep;     Energy- good.     BM good.     Started fosamax, had significant bone josefina muscle pain, had to stop. Has FU with PCP for dosing changes            Initial:  Anxiety-  random can wake middle of night  May happen during the day  Can go away quickly or hang for a week  Can feel heart racing, feels sweaty, no nausea, some HA  No diarrhea.   Deep breathing, biking, walking, reading,   Cooking  Tried ashwagandha with no benefit  Was Rx hydroxyzine, but takes rarely PRN      Sleep- tried to relax 8-10p, not always falling asleep easily. Wakes around 4-430, sometimes falls back asleep.   No meds for sleep    Energy- no issues    Hx of stroke at 14,    Broke both hips from running, dx with osteoporosis. , some pain from surgeries from screws. Occ numbness in right leg to foot post surgery, comes and goes.     Diet:   B: oatmeal, eggs, pb toast, banana  eggs avocado, milk,   L: salad, chickpeas, avocoado,   D; salmon, chicken  Sn: smoothie, yogurt, berries  Dr: water, coffee, latte, tea    BM: daily, well formed.                 Session Information  Is this acupuncture treatment being billed to the patient's insurance company: Yes  This is visit number: 9  The patient has a total number of visits of: 20  Initial Acupuncture Treatment date: 07/03/24  Name of Insurance Company:   Visit Type: Follow-up visit  Medical History Reviewed: I have reviewed pertinent medical history in EHR, and no contraindications are present to provide treatment         Review of Systems   Constitutional:  Negative for chills and fever.   HENT:  Positive for tinnitus (has seen EENT, left is worse). Negative for congestion, ear pain, rhinorrhea and trouble swallowing.    Eyes:  Negative for visual disturbance.   Respiratory:  Negative for cough, shortness of breath and wheezing.    Cardiovascular:  Negative for chest pain and palpitations.   Gastrointestinal:  Negative for constipation, nausea and vomiting.   Genitourinary:  Negative for difficulty urinating and menstrual problem.   Musculoskeletal:  Positive for arthralgias (elbows and hands,).   Skin:  Negative for rash.   Neurological:  Positive for headaches (occ with cycle.).            Provider reviewed plan for the acupuncture session, precautions and contraindications. Patient/guardian/hospital staff has given consent to treat with full understanding of what to expect during the session. Before acupuncture began, provider explained to the patient to communicate at any time if the procedure was causing discomfort past their tolerance level. Patient agreed to advise acupuncturist. The acupuncturist counseled the patient on the  risks of acupuncture treatment including pain, infection, bleeding, and no relief of pain. The patient was positioned comfortably. There was no evidence of infection at the site of needle insertions.    Objective   Physical Exam         Row Name 07/03/24 0819   Acupuncture Treatment   Body Points - Bilateral Jina baker may, yin tong, Li4, HT7, SP6, KI6, LR3   Auricular Points Yes   Auricular Points - Bilateral BFA 1-3   Other Techniques Utilized TDP Lamp   TDP Lamp Descripton feet   Needle Count In 20   Needle Count Out 20   Needle Retention Time (min) 25 minutes   Total Face to Face Time (min) 25 minutes                         Assessment/Plan   Diagnoses and all orders for this visit:  Anxiety (Primary)  Osteoporosis, unspecified osteoporosis type, unspecified pathological fracture presence                      Supplement recommendations:  Lavela WS 1265 (60 Softgels) ([employer]): Please take  1 gel, twice / day  ongoing (with a full glass of water. If you get lavender burps and they are bothersome, you can but the whole package in the freezer and take the capsules from there. )

## 2024-11-12 ENCOUNTER — LAB (OUTPATIENT)
Dept: LAB | Facility: LAB | Age: 39
End: 2024-11-12
Payer: COMMERCIAL

## 2024-11-12 DIAGNOSIS — Z01.818 PRE-OP TESTING: ICD-10-CM

## 2024-11-12 LAB
ABO GROUP (TYPE) IN BLOOD: NORMAL
ANTIBODY SCREEN: NORMAL
ERYTHROCYTE [DISTWIDTH] IN BLOOD BY AUTOMATED COUNT: 11.9 % (ref 11.5–14.5)
HCT VFR BLD AUTO: 47.1 % (ref 36–46)
HGB BLD-MCNC: 15 G/DL (ref 12–16)
MCH RBC QN AUTO: 29.9 PG (ref 26–34)
MCHC RBC AUTO-ENTMCNC: 31.8 G/DL (ref 32–36)
MCV RBC AUTO: 94 FL (ref 80–100)
NRBC BLD-RTO: 0 /100 WBCS (ref 0–0)
PLATELET # BLD AUTO: 184 X10*3/UL (ref 150–450)
RBC # BLD AUTO: 5.02 X10*6/UL (ref 4–5.2)
RH FACTOR (ANTIGEN D): NORMAL
WBC # BLD AUTO: 7.1 X10*3/UL (ref 4.4–11.3)

## 2024-11-12 PROCEDURE — 86901 BLOOD TYPING SEROLOGIC RH(D): CPT

## 2024-11-12 PROCEDURE — 86850 RBC ANTIBODY SCREEN: CPT

## 2024-11-12 PROCEDURE — 86900 BLOOD TYPING SEROLOGIC ABO: CPT

## 2024-11-12 PROCEDURE — 36415 COLL VENOUS BLD VENIPUNCTURE: CPT

## 2024-11-12 PROCEDURE — 85027 COMPLETE CBC AUTOMATED: CPT

## 2024-11-12 NOTE — HOSPITAL COURSE
[ ] needs consent    Maribel Amador is a 38 y.o. with desire for permanent sterilization presenting for bilateral laparoscopic salpingectomy.    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 Osteoporosis, migraines, anxiety, protein C deficiency  PSH: bilateral femoral fixation surgeries  Imaging: none    Lab Results   Component Value Date    WBC 7.1 11/12/2024    HGB 15.0 11/12/2024    HCT 47.1 (H) 11/12/2024    MCV 94 11/12/2024     11/12/2024       Lab Results   Component Value Date    GLUCOSE 79 07/18/2024    CALCIUM 9.4 07/18/2024     07/18/2024    K 4.3 07/18/2024    CO2 27 07/18/2024     07/18/2024    BUN 17 07/18/2024    CREATININE 0.71 07/18/2024

## 2024-11-14 ENCOUNTER — ANESTHESIA EVENT (OUTPATIENT)
Dept: OPERATING ROOM | Facility: HOSPITAL | Age: 39
End: 2024-11-14
Payer: COMMERCIAL

## 2024-11-15 ENCOUNTER — HOSPITAL ENCOUNTER (OUTPATIENT)
Facility: HOSPITAL | Age: 39
Setting detail: OUTPATIENT SURGERY
Discharge: HOME | End: 2024-11-15
Attending: STUDENT IN AN ORGANIZED HEALTH CARE EDUCATION/TRAINING PROGRAM | Admitting: STUDENT IN AN ORGANIZED HEALTH CARE EDUCATION/TRAINING PROGRAM
Payer: COMMERCIAL

## 2024-11-15 ENCOUNTER — TELEPHONE (OUTPATIENT)
Dept: OBSTETRICS AND GYNECOLOGY | Facility: CLINIC | Age: 39
End: 2024-11-15

## 2024-11-15 ENCOUNTER — ANESTHESIA (OUTPATIENT)
Dept: OPERATING ROOM | Facility: HOSPITAL | Age: 39
End: 2024-11-15
Payer: COMMERCIAL

## 2024-11-15 VITALS
TEMPERATURE: 96.8 F | OXYGEN SATURATION: 97 % | HEART RATE: 73 BPM | RESPIRATION RATE: 15 BRPM | WEIGHT: 114.2 LBS | SYSTOLIC BLOOD PRESSURE: 103 MMHG | BODY MASS INDEX: 23.07 KG/M2 | DIASTOLIC BLOOD PRESSURE: 57 MMHG

## 2024-11-15 DIAGNOSIS — Z30.2 ENCOUNTER FOR FEMALE STERILIZATION PROCEDURE: Primary | ICD-10-CM

## 2024-11-15 DIAGNOSIS — G89.18 POSTOPERATIVE PAIN: ICD-10-CM

## 2024-11-15 LAB — PREGNANCY TEST URINE, POC: NEGATIVE

## 2024-11-15 PROCEDURE — 3600000003 HC OR TIME - INITIAL BASE CHARGE - PROCEDURE LEVEL THREE: Performed by: STUDENT IN AN ORGANIZED HEALTH CARE EDUCATION/TRAINING PROGRAM

## 2024-11-15 PROCEDURE — 2500000001 HC RX 250 WO HCPCS SELF ADMINISTERED DRUGS (ALT 637 FOR MEDICARE OP): Performed by: OBSTETRICS & GYNECOLOGY

## 2024-11-15 PROCEDURE — 7100000001 HC RECOVERY ROOM TIME - INITIAL BASE CHARGE: Performed by: STUDENT IN AN ORGANIZED HEALTH CARE EDUCATION/TRAINING PROGRAM

## 2024-11-15 PROCEDURE — 58661 LAPAROSCOPY REMOVE ADNEXA: CPT | Performed by: STUDENT IN AN ORGANIZED HEALTH CARE EDUCATION/TRAINING PROGRAM

## 2024-11-15 PROCEDURE — 2500000004 HC RX 250 GENERAL PHARMACY W/ HCPCS (ALT 636 FOR OP/ED): Performed by: STUDENT IN AN ORGANIZED HEALTH CARE EDUCATION/TRAINING PROGRAM

## 2024-11-15 PROCEDURE — 7100000010 HC PHASE TWO TIME - EACH INCREMENTAL 1 MINUTE: Performed by: STUDENT IN AN ORGANIZED HEALTH CARE EDUCATION/TRAINING PROGRAM

## 2024-11-15 PROCEDURE — 3700000002 HC GENERAL ANESTHESIA TIME - EACH INCREMENTAL 1 MINUTE: Performed by: STUDENT IN AN ORGANIZED HEALTH CARE EDUCATION/TRAINING PROGRAM

## 2024-11-15 PROCEDURE — 2500000004 HC RX 250 GENERAL PHARMACY W/ HCPCS (ALT 636 FOR OP/ED)

## 2024-11-15 PROCEDURE — 81025 URINE PREGNANCY TEST: CPT | Performed by: OBSTETRICS & GYNECOLOGY

## 2024-11-15 PROCEDURE — 58662 LAPAROSCOPY EXCISE LESIONS: CPT | Performed by: STUDENT IN AN ORGANIZED HEALTH CARE EDUCATION/TRAINING PROGRAM

## 2024-11-15 PROCEDURE — 2500000005 HC RX 250 GENERAL PHARMACY W/O HCPCS: Performed by: STUDENT IN AN ORGANIZED HEALTH CARE EDUCATION/TRAINING PROGRAM

## 2024-11-15 PROCEDURE — 2500000001 HC RX 250 WO HCPCS SELF ADMINISTERED DRUGS (ALT 637 FOR MEDICARE OP): Performed by: STUDENT IN AN ORGANIZED HEALTH CARE EDUCATION/TRAINING PROGRAM

## 2024-11-15 PROCEDURE — 2720000007 HC OR 272 NO HCPCS: Performed by: STUDENT IN AN ORGANIZED HEALTH CARE EDUCATION/TRAINING PROGRAM

## 2024-11-15 PROCEDURE — 3700000001 HC GENERAL ANESTHESIA TIME - INITIAL BASE CHARGE: Performed by: STUDENT IN AN ORGANIZED HEALTH CARE EDUCATION/TRAINING PROGRAM

## 2024-11-15 PROCEDURE — 3600000008 HC OR TIME - EACH INCREMENTAL 1 MINUTE - PROCEDURE LEVEL THREE: Performed by: STUDENT IN AN ORGANIZED HEALTH CARE EDUCATION/TRAINING PROGRAM

## 2024-11-15 PROCEDURE — 7100000009 HC PHASE TWO TIME - INITIAL BASE CHARGE: Performed by: STUDENT IN AN ORGANIZED HEALTH CARE EDUCATION/TRAINING PROGRAM

## 2024-11-15 PROCEDURE — 7100000002 HC RECOVERY ROOM TIME - EACH INCREMENTAL 1 MINUTE: Performed by: STUDENT IN AN ORGANIZED HEALTH CARE EDUCATION/TRAINING PROGRAM

## 2024-11-15 RX ORDER — ACETAMINOPHEN 325 MG/1
975 TABLET ORAL EVERY 6 HOURS PRN
Qty: 50 TABLET | Refills: 0 | Status: SHIPPED | OUTPATIENT
Start: 2024-11-15

## 2024-11-15 RX ORDER — FENTANYL CITRATE 50 UG/ML
25 INJECTION, SOLUTION INTRAMUSCULAR; INTRAVENOUS EVERY 5 MIN PRN
Status: DISCONTINUED | OUTPATIENT
Start: 2024-11-15 | End: 2024-11-21 | Stop reason: HOSPADM

## 2024-11-15 RX ORDER — BUPIVACAINE HYDROCHLORIDE 5 MG/ML
INJECTION, SOLUTION PERINEURAL AS NEEDED
Status: DISCONTINUED | OUTPATIENT
Start: 2024-11-15 | End: 2024-11-15 | Stop reason: HOSPADM

## 2024-11-15 RX ORDER — LABETALOL HYDROCHLORIDE 5 MG/ML
5 INJECTION, SOLUTION INTRAVENOUS ONCE AS NEEDED
Status: DISCONTINUED | OUTPATIENT
Start: 2024-11-15 | End: 2024-11-21 | Stop reason: HOSPADM

## 2024-11-15 RX ORDER — IBUPROFEN 600 MG/1
600 TABLET ORAL EVERY 6 HOURS PRN
Qty: 50 TABLET | Refills: 0 | Status: SHIPPED | OUTPATIENT
Start: 2024-11-15

## 2024-11-15 RX ORDER — ALBUTEROL SULFATE 0.83 MG/ML
2.5 SOLUTION RESPIRATORY (INHALATION) ONCE AS NEEDED
Status: DISCONTINUED | OUTPATIENT
Start: 2024-11-15 | End: 2024-11-21 | Stop reason: HOSPADM

## 2024-11-15 RX ORDER — CELECOXIB 200 MG/1
400 CAPSULE ORAL ONCE
Status: COMPLETED | OUTPATIENT
Start: 2024-11-15 | End: 2024-11-15

## 2024-11-15 RX ORDER — PROPOFOL 10 MG/ML
INJECTION, EMULSION INTRAVENOUS AS NEEDED
Status: DISCONTINUED | OUTPATIENT
Start: 2024-11-15 | End: 2024-11-15

## 2024-11-15 RX ORDER — ONDANSETRON HYDROCHLORIDE 2 MG/ML
INJECTION, SOLUTION INTRAVENOUS AS NEEDED
Status: DISCONTINUED | OUTPATIENT
Start: 2024-11-15 | End: 2024-11-15

## 2024-11-15 RX ORDER — FENTANYL CITRATE 50 UG/ML
INJECTION, SOLUTION INTRAMUSCULAR; INTRAVENOUS AS NEEDED
Status: DISCONTINUED | OUTPATIENT
Start: 2024-11-15 | End: 2024-11-15

## 2024-11-15 RX ORDER — MEPERIDINE HYDROCHLORIDE 25 MG/ML
12.5 INJECTION INTRAMUSCULAR; INTRAVENOUS; SUBCUTANEOUS EVERY 10 MIN PRN
Status: DISCONTINUED | OUTPATIENT
Start: 2024-11-15 | End: 2024-11-21 | Stop reason: HOSPADM

## 2024-11-15 RX ORDER — MIDAZOLAM HYDROCHLORIDE 1 MG/ML
INJECTION INTRAMUSCULAR; INTRAVENOUS AS NEEDED
Status: DISCONTINUED | OUTPATIENT
Start: 2024-11-15 | End: 2024-11-15

## 2024-11-15 RX ORDER — SODIUM CHLORIDE, SODIUM LACTATE, POTASSIUM CHLORIDE, CALCIUM CHLORIDE 600; 310; 30; 20 MG/100ML; MG/100ML; MG/100ML; MG/100ML
100 INJECTION, SOLUTION INTRAVENOUS CONTINUOUS
Status: DISCONTINUED | OUTPATIENT
Start: 2024-11-15 | End: 2024-11-15 | Stop reason: HOSPADM

## 2024-11-15 RX ORDER — LIDOCAINE HYDROCHLORIDE 10 MG/ML
0.1 INJECTION, SOLUTION EPIDURAL; INFILTRATION; INTRACAUDAL; PERINEURAL ONCE
Status: DISCONTINUED | OUTPATIENT
Start: 2024-11-15 | End: 2024-11-21 | Stop reason: HOSPADM

## 2024-11-15 RX ORDER — GABAPENTIN 600 MG/1
600 TABLET ORAL ONCE
Status: COMPLETED | OUTPATIENT
Start: 2024-11-15 | End: 2024-11-15

## 2024-11-15 RX ORDER — MIDAZOLAM HYDROCHLORIDE 1 MG/ML
1 INJECTION INTRAMUSCULAR; INTRAVENOUS ONCE AS NEEDED
Status: DISCONTINUED | OUTPATIENT
Start: 2024-11-15 | End: 2024-11-21 | Stop reason: HOSPADM

## 2024-11-15 RX ORDER — ACETAMINOPHEN 325 MG/1
975 TABLET ORAL ONCE
Status: COMPLETED | OUTPATIENT
Start: 2024-11-15 | End: 2024-11-15

## 2024-11-15 RX ORDER — POLYETHYLENE GLYCOL 3350 17 G/17G
17 POWDER, FOR SOLUTION ORAL DAILY PRN
Qty: 10 PACKET | Refills: 0 | Status: SHIPPED | OUTPATIENT
Start: 2024-11-15

## 2024-11-15 RX ORDER — SODIUM CHLORIDE 0.9 G/100ML
IRRIGANT IRRIGATION AS NEEDED
Status: DISCONTINUED | OUTPATIENT
Start: 2024-11-15 | End: 2024-11-15 | Stop reason: HOSPADM

## 2024-11-15 RX ORDER — ONDANSETRON 4 MG/1
4 TABLET, FILM COATED ORAL EVERY 6 HOURS PRN
Qty: 20 TABLET | Refills: 0 | Status: SHIPPED | OUTPATIENT
Start: 2024-11-15

## 2024-11-15 RX ORDER — ONDANSETRON HYDROCHLORIDE 2 MG/ML
4 INJECTION, SOLUTION INTRAVENOUS ONCE AS NEEDED
Status: DISCONTINUED | OUTPATIENT
Start: 2024-11-15 | End: 2024-11-21 | Stop reason: HOSPADM

## 2024-11-15 RX ORDER — OXYCODONE HYDROCHLORIDE 5 MG/1
5 TABLET ORAL EVERY 6 HOURS PRN
Qty: 5 TABLET | Refills: 0 | Status: SHIPPED | OUTPATIENT
Start: 2024-11-15

## 2024-11-15 RX ORDER — ROCURONIUM BROMIDE 10 MG/ML
INJECTION, SOLUTION INTRAVENOUS AS NEEDED
Status: DISCONTINUED | OUTPATIENT
Start: 2024-11-15 | End: 2024-11-15

## 2024-11-15 RX ORDER — OXYCODONE HYDROCHLORIDE 5 MG/1
5 TABLET ORAL EVERY 4 HOURS PRN
Status: DISCONTINUED | OUTPATIENT
Start: 2024-11-15 | End: 2024-11-21 | Stop reason: HOSPADM

## 2024-11-15 RX ORDER — LIDOCAINE HYDROCHLORIDE 20 MG/ML
INJECTION, SOLUTION INFILTRATION; PERINEURAL AS NEEDED
Status: DISCONTINUED | OUTPATIENT
Start: 2024-11-15 | End: 2024-11-15

## 2024-11-15 SDOH — HEALTH STABILITY: MENTAL HEALTH: CURRENT SMOKER: 0

## 2024-11-15 ASSESSMENT — PAIN SCALES - GENERAL
PAINLEVEL_OUTOF10: 2
PAINLEVEL_OUTOF10: 0 - NO PAIN
PAINLEVEL_OUTOF10: 0 - NO PAIN
PAINLEVEL_OUTOF10: 1
PAINLEVEL_OUTOF10: 2

## 2024-11-15 ASSESSMENT — COLUMBIA-SUICIDE SEVERITY RATING SCALE - C-SSRS
1. IN THE PAST MONTH, HAVE YOU WISHED YOU WERE DEAD OR WISHED YOU COULD GO TO SLEEP AND NOT WAKE UP?: NO
6. HAVE YOU EVER DONE ANYTHING, STARTED TO DO ANYTHING, OR PREPARED TO DO ANYTHING TO END YOUR LIFE?: NO
2. HAVE YOU ACTUALLY HAD ANY THOUGHTS OF KILLING YOURSELF?: NO

## 2024-11-15 ASSESSMENT — PAIN - FUNCTIONAL ASSESSMENT
PAIN_FUNCTIONAL_ASSESSMENT: 0-10

## 2024-11-15 NOTE — TELEPHONE ENCOUNTER
----- Message from Jackie Garces sent at 11/15/2024 11:47 AM EST -----  Regarding: Post Op Visit  Hello,    Would you be able to schedule a 4 week post op visit for a laparoscopic bilateral salpingectomy for Dr. Spicer for this patient? Thank you.    Best,    Jackie Garces MD  PGY-1, Obstetrics and Gynecology    11/15/24 Pt is scheduled 12/2/24

## 2024-11-15 NOTE — ANESTHESIA PREPROCEDURE EVALUATION
Patient: Maribel Amador    Procedure Information       Anesthesia Start Date/Time: 11/15/24 1035    Procedure: Salpingectomy Laparoscopy (Bilateral)    Location: [facility] OR 02 / Virtual [facility] OR    Surgeons: Brent Spicer MD          Vitals:    11/15/24 0945   BP: 110/56   Pulse: 83   Resp: 18   Temp: 36.8 °C (98.2 °F)   SpO2: 98%       No past surgical history on file.  Past Medical History:   Diagnosis Date    Abnormal FSH level 09/20/2023    Abnormal LH level 09/20/2023    Cerebral infarction, unspecified (Multi) 03/24/2015    CVA (cerebral infarction)    Muscle contracture 09/20/2023    Pain in right hip 04/24/2015    Right hip pain    Stress fracture of neck of left femur 09/20/2023     No current facility-administered medications for this encounter.    Facility-Administered Medications Ordered in Other Encounters:     lidocaine (Xylocaine) 20 mg/mL (2 %) injection, , miscellaneous, PRN, Barrington Diaz MD, 60 mg at 11/15/24 1041    propofol (Diprivan) injection, , intravenous, PRN, Barrington Diaz MD, 150 mg at 11/15/24 1041    rocuronium (ZeMuron) injection, , intravenous, PRN, Barrington Diaz MD, 50 mg at 11/15/24 1041  Prior to Admission medications    Medication Sig Start Date End Date Taking? Authorizing Provider   aspirin 81 mg EC tablet Take 1 tablet (81 mg) by mouth once daily. 2/2/24 2/1/25 Yes Mariza Jimenez, DO   cyanocobalamin, vitamin B-12, (VITAMIN B-12 ORAL) Take by mouth.   Yes Historical Provider, MD   ferrous fumarate-vitamin C (Lucia-Sequeles 65-25) Take 1 tablet by mouth 3 times daily (morning, midday, late afternoon). Do not crush, chew, or split.   Yes Historical Provider, MD   phytonadione, vit K1, (vitamin k) 100 mcg tablet Take 1 tablet (100 mcg) by mouth once daily.   Yes Historical Provider, MD   TURMERIC ORAL Take by mouth.   Yes Historical Provider, MD   hydrOXYzine HCL (Atarax) 10 mg tablet Take 1 tablet (10 mg) by mouth every 6 hours if needed for  anxiety. 1/17/24 4/24/24  Corinne A Bazella, MD   abaloparatide (Tymlos) 80 mcg (3,120 mcg/1.56 mL) pen injector Inject 80 mg under the skin once daily. 5/13/24 7/9/24  Karen Martins MD     No Known Allergies  Social History     Tobacco Use    Smoking status: Never    Smokeless tobacco: Never   Substance Use Topics    Alcohol use: Never         Chemistry    Lab Results   Component Value Date/Time     07/18/2024 0651    K 4.3 07/18/2024 0651     07/18/2024 0651    CO2 27 07/18/2024 0651    BUN 17 07/18/2024 0651    CREATININE 0.71 07/18/2024 0651    Lab Results   Component Value Date/Time    CALCIUM 9.4 07/18/2024 0651    ALKPHOS 75 07/18/2024 0651    AST 23 07/18/2024 0651    ALT 31 07/18/2024 0651    BILITOT 0.4 07/18/2024 0651          Lab Results   Component Value Date/Time    WBC 7.1 11/12/2024 0708    HGB 15.0 11/12/2024 0708    HCT 47.1 (H) 11/12/2024 0708     11/12/2024 0708     Lab Results   Component Value Date/Time    PROTIME 12.0 09/27/2023 0959    INR 1.1 09/27/2023 0959     No results found. However, due to the size of the patient record, not all encounters were searched. Please check Results Review for a complete set of results.     Relevant Problems   Neuro   (+) Anxiety      Hematology   (+) Protein C deficiency (Multi)       Clinical information reviewed:    Allergies  Meds               NPO Detail:  NPO/Void Status  Date of Last Liquid: 11/15/24  Time of Last Liquid: 0000  Date of Last Solid: 11/15/24  Time of Last Solid: 0000         Physical Exam    Airway  Mallampati: II  TM distance: >3 FB     Cardiovascular - normal exam  Rhythm: regular  Rate: normal     Dental - normal exam     Pulmonary - normal exam     Abdominal - normal exam  Abdomen: soft             Anesthesia Plan    History of general anesthesia?: yes  History of complications of general anesthesia?: no    ASA 2     general     The patient is not a current smoker.  Patient was previously instructed to abstain  from smoking on day of procedure.  Patient did not smoke on day of procedure.  Education provided regarding risk of obstructive sleep apnea.  intravenous induction   Postoperative administration of opioids is intended.  Anesthetic plan and risks discussed with patient.  Use of blood products discussed with patient who.    Plan discussed with resident.

## 2024-11-15 NOTE — DISCHARGE INSTRUCTIONS
Below you will find some general instructions for your care after your surgery. Please let me know if you have any questions. Myself or a partner can be reached 24-hours per day at [phone] ([place] Office) or [phone] ([place] Office).     Post-operative guidelines    Call the office if you:   run a fever greater than 100.5 degrees for more than 24 hours   experience vaginal bleeding greater in amount than a regular period (soaking more than 1 pad per hour x 2 hours)   have an unusual vaginal discharge   are persistently nauseated or have vomiting   have difficulty urinating   have increasing pain or pain that is not controlled by the pain medication provided to you      Pain Medication  We recommend that you alternate the use of ibuprofen 600 mg every 6 hours, with Tylenol 1000 mg every 6 hours.  Thus, you will take a set of pills every 3 hours. You can also take both tablets together at the same time every 6 hours if your prefer. That way you will always have some pain medication in your system providing relief.  After the first few days, you can begin to use these medications only as needed.   Use oxycodone 5 mg when pain becomes more intense.  You will notice more pain as you begin to be more active, which is normal.  However, note that oxycodone and other narcotics are very constipating.  If you do not require additional pain relief, it is best to avoid narcotic use.  Narcotic medication can cause nausea in some people.  If you experience nausea after using narcotics, consider taking 1 tablet of Zofran 30 minutes prior to taking oxycodone or another narcotic.  Keep in mind, anti-nausea medications are also constipating.  Some patients have shoulder or neck pain for a couple days after surgery. This is typically due to nerve pain caused by the gas we use to inflate the belly. Over the counter lidocaine patches can be used to help with this pain. The most common brand is SalonPas.      Constipation  If you are requiring narcotic medication, make sure you are using a stool softener or laxative.  Senokot.  Take 1 pill in the morning and 1 pill at night.  This is a gentle combination stool softener and laxative.  It does not work well unless you drink water.  If you are not having any movement with this dose, you can safely double the amount of Senokot you are taking to 2 pills in the morning and 2 pills at night.  After surgery, it is common to be constipated for several days.  Do not expect a bowel movement for 3-5 days after surgery.  If you have not yet had a bowel movement by day 4 after surgery you can consider taking over-the-counter MiraLax in the morning.  If this still has not produced a bowel movement, on day 5 you can consider adding to your regimen milk of magnesia.  Milk of magnesia is a very strong laxative.  It can cause quite a bit of stomach cramping.  It takes about 6-8 hours to work after you take the milk of magnesia.  If you are feeling “full” and close to a bowel movement, but just can't achieve a bowel movement, you might consider using a suppository or enema. These are also over-the-counter and helps evacuate the rectum.  If you are having significant discomfort from gas pain, you can take over-the-counter simethicone.  A common brand name is called Gas-X.    Activity  We encourage you to walk at least 30 minutes every day, broken up in to 10 minute intervals.  You may be walking slowly, but it is important to move your body after surgery.  Pelvic rest means nothing in the vagina, including no intercourse, tampons, soaking in hot tubs or the pool, douching.  If you have been advised to follow pelvic rest, please follow these guidelines. These restrictions last for 6 weeks.   If you have been advised not to lift greater than 10-20 lb after surgery, your exercise should only be limited to walking.  Lifting heavy items increases pressure on the pelvic floor.  Limited bending  and twisting is okay however is likely to be painful for you. If you have had a laparoscopic or robotic procedure (a procedure completed through small incisions) but not a hysterectomy, these restrictions last for 4 weeks.     Bleeding  You may have spotting or light bleeding after surgery.  You may notice that bleeding becomes a bit heavier as you become more active.  This is nothing to worry about.  However, if you begin bleeding briskly (requiring multiple pad changes in the day or soaking a full pad within 1-2 hours) please let the office know so you can be evaluated.  Your menstrual cycle may be somewhat unpredictable for 1-3 cycles after surgery.  You may have your menstrual cycle immediately after surgery or may skip a full cycle.  This is also within the range of normal.    Dressings/incisions  If you have abdominal incisions, you will likely have small bandages called Steri-Strips on your smaller incisions.  They should fall off on their own within 7-14 days.  If they fall off sooner than 7 days, nothing to worry about.  If they are still on after 1 week, go ahead and remove the Steri-Strips at that point.  You can get the Steri-Strips wet in the shower.  Just do not scrub into the incisions.  Soap up the upper part of your abdomen and let the soapy water run down.  Once the Steri-Strips are off, continue to keep the incisions clean and dry.  There is dissolvable suture under the skin of your incisions.  It is not uncommon to notice small bits of suture coming free from the incisions after a few weeks.  Sometimes it is difficult to care for the belly button incision.  If you become concerned about the belly button incision or it is not easy to keep clean, you can use a cotton ball soaked in hydrogen peroxide to gently wipe the belly button incision once a day.  Please call the office if you note rapidly enlarging redness around the belly button or thick white, opaque yellow or green discharge from the belly  button.  Clear or straw-colored drainage can be present and is not concerning.  You can always send a photograph of your belly button incision through the patient portal if you are concerned about how it looks.  Because the belly button has the largest incision, we use more layers of suture in this area.  You may feel a hard knot under the skin of your belly button incision at the top and bottom of the incision.  This is most likely the suture knot of the bottom layer of suture under your belly button.  This area may be slightly tender, but should not be extremely painful.  These sutures will dissolve slowly over time (3 months).    Best,  Dr. Spicer

## 2024-11-15 NOTE — H&P
Gynecologic Surgery History and Physical    Subjective   Maribel Amador is a 38 y.o. with desire for permanent sterilization presenting for bilateral laparoscopic salpingectomy.    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 Osteoporosis, migraines, anxiety, protein C deficiency  PSH: bilateral femoral fixation surgeries  Imaging: none    Lab Results   Component Value Date    WBC 7.1 2024    HGB 15.0 2024    HCT 47.1 (H) 2024    MCV 94 2024     2024       Lab Results   Component Value Date    GLUCOSE 79 2024    CALCIUM 9.4 2024     2024    K 4.3 2024    CO2 27 2024     2024    BUN 17 2024    CREATININE 0.71 2024         Obstetrical History   OB History    Para Term  AB Living   2 1 1 0 1 1   SAB IAB Ectopic Multiple Live Births   1 0 0 0 1      # Outcome Date GA Lbr Yoshi/2nd Weight Sex Type Anes PTL Lv   2 SAB            1 Term                Past Medical History  Past Medical History:   Diagnosis Date    Abnormal FSH level 2023    Abnormal LH level 2023    Cerebral infarction, unspecified (Multi) 2015    CVA (cerebral infarction)    Muscle contracture 2023    Pain in right hip 2015    Right hip pain    Stress fracture of neck of left femur 2023        Past Surgical History   No past surgical history on file.    Social History  Social History     Tobacco Use    Smoking status: Never    Smokeless tobacco: Never   Substance Use Topics    Alcohol use: Never     Substance and Sexual Activity   Drug Use Never       Allergies  Patient has no known allergies.     Medications  Medications Prior to Admission   Medication Sig Dispense Refill Last Dose/Taking    aspirin 81 mg EC tablet Take 1 tablet (81 mg) by mouth once daily. 30 tablet 2 Past Week    cyanocobalamin, vitamin B-12, (VITAMIN B-12 ORAL) Take by mouth.   Past Week    ferrous fumarate-vitamin  C (Lucia-Sequ[place] 65-25) Take 1 tablet by mouth 3 times daily (morning, midday, late afternoon). Do not crush, chew, or split.   Past Week    phytonadione, vit K1, (vitamin k) 100 mcg tablet Take 1 tablet (100 mcg) by mouth once daily.   Past Week    TURMERIC ORAL Take by mouth.   Past Week    hydrOXYzine HCL (Atarax) 10 mg tablet Take 1 tablet (10 mg) by mouth every 6 hours if needed for anxiety. 30 tablet 2        ROS: negative except per HPI    Objective    Last Vitals  /56   Pulse 83   Temp 36.8 °C (98.2 °F)   Resp 18   Wt 51.8 kg (114 lb 3.2 oz)   SpO2 98%   BMI 23.07 kg/m²     Physical Examination  General: no acute distress  HEENT: normocephalic, atraumatic  Heart: warm and well perfused  Lungs: breathing comfortably on room air  Abdomen: nondistended  Extremities: moving all extremities  Neuro: awake and conversant  Psych: appropriate mood and affect    Lab Review  Results from last 7 days   Lab Units 11/12/24  0708   HEMOGLOBIN g/dL 15.0   HEMATOCRIT % 47.1*   PLATELETS AUTO x10*3/uL 184         Lab Results   Component Value Date    WBC 7.1 11/12/2024    HGB 15.0 11/12/2024    HCT 47.1 (H) 11/12/2024    MCV 94 11/12/2024     11/12/2024       Lab Results   Component Value Date    GLUCOSE 79 07/18/2024    CALCIUM 9.4 07/18/2024     07/18/2024    K 4.3 07/18/2024    CO2 27 07/18/2024     07/18/2024    BUN 17 07/18/2024    CREATININE 0.71 07/18/2024       Assessment/Plan     Maribel Amador is a 39 y.o. with esire for permanent sterilization  presenting for scheduled surgery.    Plan to proceed with bilateral laparoscopic salpingectomy and all indicated procedures.  Surgical consent was reviewed. The risks of surgery were discussed including: bleeding (including need for blood transfusion in life-threatening situations; risks of transfusion), infection, damage to surrounding organs. The patient had the opportunity to answer questions and desired to proceed with surgery following  our discussion. Both verbal and written consents were obtained.    Seen and discussed with Dr. Dannielle Garces MD  PGY-1, Obstetrics and Gynecology

## 2024-11-15 NOTE — ANESTHESIA POSTPROCEDURE EVALUATION
Patient: Maribel Amador    Procedure Summary       Date: 11/15/24 Room / Location: [facility] OR 02 / Virtual [facility] OR    Anesthesia Start: 1035 Anesthesia Stop: 1153    Procedure: Salpingectomy Laparoscopy (Bilateral) Diagnosis:       Encounter for female sterilization procedure      (Encounter for female sterilization procedure [Z30.2])    Surgeons: Brent Spicer MD Responsible Provider: Sylvester Callaway DO    Anesthesia Type: general ASA Status: 2            Anesthesia Type: general    Vitals Value Taken Time   /65 11/15/24 1157   Temp 36.5 11/15/24 1157   Pulse 68 11/15/24 1154   Resp 12 11/15/24 1154   SpO2 99 % 11/15/24 1154   Vitals shown include unfiled device data.    Anesthesia Post Evaluation    Patient location during evaluation: PACU  Patient participation: complete - patient participated  Level of consciousness: awake and alert  Pain management: satisfactory to patient  Airway patency: patent  Cardiovascular status: blood pressure returned to baseline and acceptable  Respiratory status: acceptable  Hydration status: acceptable  Postoperative Nausea and Vomiting: none        There were no known notable events for this encounter.

## 2024-11-15 NOTE — OP NOTE
Date: 11/15/2024  OR Location: [facility] OR    Name: Maribel Amador : 1985, Age: 39 y.o., MRN: [id number], Sex: female    Diagnosis  Pre-op Diagnosis      * Encounter for female sterilization procedure [Z30.2]  Anxiety Post-op Diagnosis     * Encounter for female sterilization procedure [Z30.2]  Anxiety  Concern for Endometriosis     Procedures  Laparoscopic Bilateral Salpingectomy, Excision of endometriosis  08984 - AL LAPAROSCOPY W/RMVL ADNEXAL STRUCTURES  16904 - Laparoscopic, surgical; fulguration or excision of lesions of the ovary, pelvic viscera, or peritoneal surface     Surgeons      * Brent Spicer - Primary    Resident/Fellow/Other Assistant:  Surgeons and Role:     * Michele Yousif MD - Resident - Assisting     * Jackie Garces MD - Resident - Assisting    Staff:   Circulator: Aviva  Scrub Person: Chaitanya  Scrub Person: Floresita Hull Circulator: Margret Hull Scrub: Ankit    Anesthesia Staff: Anesthesiologist: Sylvester Callaway DO  Anesthesia Resident: Barrington Diaz MD    Procedure Summary  Anesthesia: Anesthesia type not filed in the log.  ASA: ASA status not filed in the log.  Estimated Blood Loss: 5mL  Intra-op Medications:   Administrations occurring from 1105 to 1250 on 11/15/24:   Medication Name Total Dose   BUPivacaine HCl (Marcaine) 0.5 % (5 mg/mL) injection 10 mL   sodium chloride 0.9 % irrigation solution 1,000 mL   fentaNYL (Sublimaze) injection 50 mcg/mL 50 mcg   LR bolus Cannot be calculated   ondansetron 2 mg/mL 4 mg   propofol (Diprivan) injection 10 mg/mL 20 mg   rocuronium (ZeMuron) 50 mg/5 mL injection 10 mg   sugammadex (Bridion) 200 mg/2 mL injection 200 mg              Anesthesia Record               Intraprocedure I/O Totals          Intake    LR bolus 400.00 mL    Total Intake 400 mL          Specimen:   ID Type Source Tests Collected by Time   1 : BILATERAL FALLOPIAN TUBES Tissue FALLOPIAN TUBE SALPINGECTOMY LEFT AND RIGHT SURGICAL PATHOLOGY EXAM Brent GARCIA  MD Dannielle 11/15/2024 1125   2 : POSTERIOR  CUL DE SAC Tissue CUL DE SAC EXCISION SURGICAL PATHOLOGY EXAM Brent Spicer MD 11/15/2024 1127                 Procedure Details:  The patient was seen in the preoperative area. The site of surgery was properly noted/marked if necessary per policy. The patient has been actively warmed in preoperative area. Preoperative antibiotics have been ordered and given within 1 hours of incision. Venous thrombosis prophylaxis are not indicated.    Findings: Uterus with fundal sub-serosal fibroid ~1 cm. Normal ovaries and normal fallopian tubes. Gunpowder lesions with surrounding peritoneal fibrosis in the posterior cul-de-sac and right ovarian fossa consistent with endometriosis. Filmy adhesions between small bowel and anterior abdominal wall in the right upper quadrant.      Procedure:  After an informed consent was obtained, the patient was taken to the operating room and the general anesthetic was administered. She was then positioned in the dorsal lithotomy position with arms tucked and prepped and draped in the normal sterile fashion. Hollins catheter was placed. A tenaculum was placed on the anterior lip of the cervix. A uVore manipulator was placed.    Next, attention was then turned to the abdomen. An incision was made at the base of the umbilicus and the 5mm scope and 5mm trocar were introduced to gain intraperitoneal access using direct visualization. Insufflation was activated with an opening pressure of 5mmHg and pneumoperitoneum was established with CO2 gas. Intra-abdominal survey revealed atraumatic entry and findings as above. The patient was placed in Trendelenburg. Two more 5mm trocars were placed under direct visualization, one in the RLQ and one in the LLQ.    The uterus was elevated using the uterine manipulator. The fimbriae of the right fallopian tube was grasped and the ligasure device was used to separate the fallopian tube from the mesosalpinx to  the level of the cornua where the tube was transected and freed. It was removed under direct visualization and passed off for pathology. This procedure was repeated on the left side to remove the left fallopian tube. Attention was then turned to the endometriosis lesion in the posterior cul de sac. The bilateral ureters were visualized transperitoneally and noted to be significantly distant from the lesion. The lesion was then elevated from the side wall and peritoneum incised with monopolar scissors. The underlying tissue was dissected off and lesion excised completely. It was removed and passed off for pathology. Additional lesions noted in the left ovarian fossa; given location overlying ureter and vessels, lesion was left in situ.    Hemostasis was confirmed under decreased insufflation pressure. Pneumoperitoneum was evacuated and the laparoscopic ports were removed. Skin incisions were closed with 4-0 monocryl and steri-strips. All instruments were removed from the vagina and hemostasis confirmed. Hollins catheter was removed. Sponge, lap, and needle counts were correct x2. The patient was taken back to the PACU in stable condition following reversal of anesthesia. Dr. Spicer was present for the entire procedure.      Complications:  None; patient tolerated the procedure well.     Disposition: PACU - hemodynamically stable.  Condition: stable      Jackie Garces MD  PGY-1, Obstetrics and Gynecology

## 2024-11-15 NOTE — ANESTHESIA PROCEDURE NOTES
Airway  Date/Time: 11/15/2024 10:44 AM  Urgency: elective      Staffing  Performed: resident and attending   Authorized by: Sylvester Callaway DO    Performed by: Barrington Diaz MD  Patient location during procedure: OR    Indications and Patient Condition  Indications for airway management: anesthesia  Spontaneous Ventilation: absent  Sedation level: deep  Preoxygenated: yes  Patient position: sniffing  Mask difficulty assessment: 1 - vent by mask    Final Airway Details  Final airway type: endotracheal airway      Successful airway: ETT     Successful intubation technique: direct laryngoscopy  Blade: Sushant  Blade size: #3  ETT size (mm): 7.0  Cormack-Lehane Classification: grade IIb - view of arytenoids or posterior of glottis only  Placement verified by: chest auscultation and capnometry   Measured from: lips  ETT to lips (cm): 21  Number of attempts at approach: 1

## 2024-11-18 ENCOUNTER — APPOINTMENT (OUTPATIENT)
Dept: RADIOLOGY | Facility: CLINIC | Age: 39
End: 2024-11-18
Payer: COMMERCIAL

## 2024-11-22 ENCOUNTER — APPOINTMENT (OUTPATIENT)
Dept: INTEGRATIVE MEDICINE | Facility: CLINIC | Age: 39
End: 2024-11-22
Payer: COMMERCIAL

## 2024-11-22 DIAGNOSIS — M81.0 OSTEOPOROSIS, UNSPECIFIED OSTEOPOROSIS TYPE, UNSPECIFIED PATHOLOGICAL FRACTURE PRESENCE: ICD-10-CM

## 2024-11-22 DIAGNOSIS — F41.9 ANXIETY: Primary | ICD-10-CM

## 2024-11-22 PROCEDURE — 97811 ACUP 1/> W/O ESTIM EA ADD 15: CPT | Performed by: NATUROPATH

## 2024-11-22 PROCEDURE — 97810 ACUP 1/> WO ESTIM 1ST 15 MIN: CPT | Performed by: NATUROPATH

## 2024-11-22 ASSESSMENT — ENCOUNTER SYMPTOMS
TROUBLE SWALLOWING: 0
HEADACHES: 1
COUGH: 0
CHILLS: 0
DIFFICULTY URINATING: 0
FEVER: 0
ARTHRALGIAS: 1
CONSTIPATION: 0
NAUSEA: 0
RHINORRHEA: 0
WHEEZING: 0
SHORTNESS OF BREATH: 0
PALPITATIONS: 0
VOMITING: 0

## 2024-11-22 NOTE — PROGRESS NOTES
Acupuncture Visit:     Subjective   Patient ID: Maribel Amador is a 39 y.o. female who presents for No chief complaint on file.  Sleep- trouble staying asleep. Bad post surgery  No pain now from procedure  Anxiety increased [place] to poor sleep.         Sleep back to normal, staying asleep   No pain today.   Anxiety- much better,  no little attacks.   BM normal        Sleep off the past few nights. Hard time staying asleep  Bone pain gone  Anxiety- OK  BM- normal.       Previous  Bone pain has cleared  Anxiety- low   Sleep good, off night last night  BM-    Previous  Bone pain is improving from medication, but still present  Worse in AM  Arms and shoulders  Anxiety- improved   Sleep- improved, better since less pain.       Previous  Had same SE with boniva, pain in neck mid back  Elbows, shoulder  Pain reduced since stopping  Anxiety still improved  Sleep- hard due to SE, pain when laying        Sleep has been more consistety  Anxiety ahs been stable.   Started on Boniva today, had bad SE with previous meds  Energy good        Previous  Feeling well  4 days of sleep was off, more anxiety since  Has reduced back to normal recently.   No issues with lavela.     Energy good  BM normal            Feels more relaxed overall  Had one episode of more anxiety, was able to clear quickly  Started on Lavela, no issues  Sleep- A  lot better, still waking occ in middle of night, able to get back to sleep;     Energy- good.     BM good.     Started fosamax, had significant bone josefina muscle pain, had to stop. Has FU with PCP for dosing changes            Initial:  Anxiety-  random can wake middle of night  May happen during the day  Can go away quickly or hang for a week  Can feel heart racing, feels sweaty, no nausea, some HA  No diarrhea.   Deep breathing, biking, walking, reading,   Cooking  Tried ashwagandha with no benefit  Was Rx hydroxyzine, but takes rarely PRN      Sleep- tried to relax 8-10p, not always falling asleep  easily. Wakes around 4-430, sometimes falls back asleep.   No meds for sleep    Energy- no issues    Hx of stroke at 14,   Broke both hips from running, dx with osteoporosis. , some pain from surgeries from screws. Occ numbness in right leg to foot post surgery, comes and goes.     Diet:   B: oatmeal, eggs, pb toast, banana  eggs avocado, milk,   L: salad, chickpeas, avocoado,   D; salmon, chicken  Sn: smoothie, yogurt, berries  Dr: water, coffee, latte, tea    BM: daily, well formed.                 Session Information  Is this acupuncture treatment being billed to the patient's insurance company: Yes  This is visit number: 10  The patient has a total number of visits of: 20  Initial Acupuncture Treatment date: 07/03/24  Name of Insurance Company:   Visit Type: Follow-up visit  Medical History Reviewed: I have reviewed pertinent medical history in EHR, and no contraindications are present to provide treatment         Review of Systems   Constitutional:  Negative for chills and fever.   HENT:  Positive for tinnitus (has seen EENT, left is worse). Negative for congestion, ear pain, rhinorrhea and trouble swallowing.    Eyes:  Negative for visual disturbance.   Respiratory:  Negative for cough, shortness of breath and wheezing.    Cardiovascular:  Negative for chest pain and palpitations.   Gastrointestinal:  Negative for constipation, nausea and vomiting.   Genitourinary:  Negative for difficulty urinating and menstrual problem.   Musculoskeletal:  Positive for arthralgias (elbows and hands,).   Skin:  Negative for rash.   Neurological:  Positive for headaches (occ with cycle.).            Provider reviewed plan for the acupuncture session, precautions and contraindications. Patient/guardian/hospital staff has given consent to treat with full understanding of what to expect during the session. Before acupuncture began, provider explained to the patient to communicate at any time if the procedure was causing  discomfort past their tolerance level. Patient agreed to advise acupuncturist. The acupuncturist counseled the patient on the risks of acupuncture treatment including pain, infection, bleeding, and no relief of pain. The patient was positioned comfortably. There was no evidence of infection at the site of needle insertions.    Objective   Physical Exam         Row Name 07/03/24 0819   Acupuncture Treatment   Body Points - Bilateral Jina baker may, yin tong, Li4, HT7, SP6, KI6, LR3   Auricular Points Yes   Auricular Points - Bilateral BFA 1-3   Other Techniques Utilized TDP Lamp   TDP Lamp Descripton feet   Needle Count In 20   Needle Count Out 20   Needle Retention Time (min) 25 minutes   Total Face to Face Time (min) 25 minutes                         Assessment/Plan   Diagnoses and all orders for this visit:  Anxiety (Primary)  Osteoporosis, unspecified osteoporosis type, unspecified pathological fracture presence                        Supplement recommendations:  iGna WS 1265 (60 Softgels) ([employer]): Please take  1 gel, twice / day  ongoing (with a full glass of water. If you get lavender burps and they are bothersome, you can but the whole package in the freezer and take the capsules from there. )

## 2024-11-26 LAB
LABORATORY COMMENT REPORT: NORMAL
PATH REPORT.FINAL DX SPEC: NORMAL
PATH REPORT.GROSS SPEC: NORMAL
PATH REPORT.RELEVANT HX SPEC: NORMAL
PATH REPORT.TOTAL CANCER: NORMAL

## 2024-12-02 ENCOUNTER — OFFICE VISIT (OUTPATIENT)
Dept: OBSTETRICS AND GYNECOLOGY | Facility: CLINIC | Age: 39
End: 2024-12-02
Payer: COMMERCIAL

## 2024-12-02 ENCOUNTER — APPOINTMENT (OUTPATIENT)
Dept: OBSTETRICS AND GYNECOLOGY | Facility: CLINIC | Age: 39
End: 2024-12-02
Payer: COMMERCIAL

## 2024-12-02 VITALS
BODY MASS INDEX: 22.18 KG/M2 | SYSTOLIC BLOOD PRESSURE: 110 MMHG | HEIGHT: 59 IN | WEIGHT: 110 LBS | DIASTOLIC BLOOD PRESSURE: 70 MMHG

## 2024-12-02 DIAGNOSIS — N80.9 ENDOMETRIOSIS DETERMINED BY LAPAROSCOPY: ICD-10-CM

## 2024-12-02 DIAGNOSIS — Z30.2 ENCOUNTER FOR FEMALE STERILIZATION PROCEDURE: Primary | ICD-10-CM

## 2024-12-02 PROCEDURE — 1036F TOBACCO NON-USER: CPT | Performed by: STUDENT IN AN ORGANIZED HEALTH CARE EDUCATION/TRAINING PROGRAM

## 2024-12-02 PROCEDURE — 99024 POSTOP FOLLOW-UP VISIT: CPT | Performed by: STUDENT IN AN ORGANIZED HEALTH CARE EDUCATION/TRAINING PROGRAM

## 2024-12-02 PROCEDURE — 3008F BODY MASS INDEX DOCD: CPT | Performed by: STUDENT IN AN ORGANIZED HEALTH CARE EDUCATION/TRAINING PROGRAM

## 2024-12-02 ASSESSMENT — PAIN SCALES - GENERAL: PAINLEVEL_OUTOF10: 0-NO PAIN

## 2024-12-02 NOTE — ASSESSMENT & PLAN NOTE
Now post op week 2  Pathology reviewed - benign  Meeting all milestones  May return to normal ADLs without restrictions

## 2024-12-02 NOTE — PROGRESS NOTES
2 week POV     Chaperone declined: Yina Vallejo, CM3      Subjective   Patient ID: Maribel Amador is a 39 y.o. female who presents for 2w post op from [facility] bilateral salpingectomy, excision of endometriosis.    Doing well, appetite returning. Reaction to dermabond - itching, redness at LSC sites, now resolved. No other concerns.      Objective   Physical Exam  Constitutional:       General: She is not in acute distress.     Appearance: Normal appearance.   HENT:      Head: Normocephalic.   Cardiovascular:      Rate and Rhythm: Normal rate.   Pulmonary:      Effort: Pulmonary effort is normal. No respiratory distress.   Abdominal:      Comments: Soft, nontender, nondistended. 3 laparoscopic port sites well healed without tenderness or masses   Skin:     General: Skin is warm and dry.   Neurological:      Mental Status: She is alert and oriented to person, place, and time.   Psychiatric:         Mood and Affect: Mood normal.         Behavior: Behavior normal.         Thought Content: Thought content normal.         Judgment: Judgment normal.         Assessment/Plan   Problem List Items Addressed This Visit             ICD-10-CM    Endometriosis determined by laparoscopy N80.9     Confirmed on pathology  Periods previously bad, not currently bothersome  No indication for further intervention at this time         Encounter for female sterilization procedure - Primary Z30.2     Now post op week 2  Pathology reviewed - benign  Meeting all milestones  May return to normal ADLs without restrictions                 Brent Spicer MD 12/02/24 8:58 AM

## 2024-12-02 NOTE — ASSESSMENT & PLAN NOTE
Confirmed on pathology  Periods previously bad, not currently bothersome  No indication for further intervention at this time

## 2024-12-04 ENCOUNTER — HOSPITAL ENCOUNTER (OUTPATIENT)
Dept: RADIOLOGY | Facility: CLINIC | Age: 39
Discharge: HOME | End: 2024-12-04
Payer: COMMERCIAL

## 2024-12-04 DIAGNOSIS — Z01.419 WELL WOMAN EXAM WITH ROUTINE GYNECOLOGICAL EXAM: ICD-10-CM

## 2024-12-04 PROCEDURE — 77067 SCR MAMMO BI INCL CAD: CPT

## 2024-12-04 PROCEDURE — 77067 SCR MAMMO BI INCL CAD: CPT | Performed by: RADIOLOGY

## 2024-12-04 PROCEDURE — 77063 BREAST TOMOSYNTHESIS BI: CPT | Performed by: RADIOLOGY

## 2024-12-06 ENCOUNTER — APPOINTMENT (OUTPATIENT)
Dept: INTEGRATIVE MEDICINE | Facility: CLINIC | Age: 39
End: 2024-12-06
Payer: COMMERCIAL

## 2024-12-06 DIAGNOSIS — F41.9 ANXIETY: Primary | ICD-10-CM

## 2024-12-06 PROCEDURE — 97811 ACUP 1/> W/O ESTIM EA ADD 15: CPT | Performed by: NATUROPATH

## 2024-12-06 PROCEDURE — 97810 ACUP 1/> WO ESTIM 1ST 15 MIN: CPT | Performed by: NATUROPATH

## 2024-12-06 ASSESSMENT — ENCOUNTER SYMPTOMS
PALPITATIONS: 0
FEVER: 0
SHORTNESS OF BREATH: 0
NAUSEA: 0
COUGH: 0
VOMITING: 0
CONSTIPATION: 0
DIFFICULTY URINATING: 0
WHEEZING: 0
HEADACHES: 1
ARTHRALGIAS: 1
RHINORRHEA: 0
TROUBLE SWALLOWING: 0
CHILLS: 0

## 2024-12-06 NOTE — PROGRESS NOTES
Acupuncture Visit:     Subjective   Patient ID: Maribel Amador is a 39 y.o. female who presents for No chief complaint on file.  Sleep getting back to normal  Anxiety - better,   No pain  BM normal        Sleep- trouble staying asleep. Bad post surgery  No pain now from procedure  Anxiety increased [place] to poor sleep.         Sleep back to normal, staying asleep   No pain today.   Anxiety- much better,  no little attacks.   BM normal        Sleep off the past few nights. Hard time staying asleep  Bone pain gone  Anxiety- OK  BM- normal.       Previous  Bone pain has cleared  Anxiety- low   Sleep good, off night last night  BM-    Previous  Bone pain is improving from medication, but still present  Worse in AM  Arms and shoulders  Anxiety- improved   Sleep- improved, better since less pain.       Previous  Had same SE with boniva, pain in neck mid back  Elbows, shoulder  Pain reduced since stopping  Anxiety still improved  Sleep- hard due to SE, pain when laying        Sleep has been more consistety  Anxiety ahs been stable.   Started on Boniva today, had bad SE with previous meds  Energy good        Previous  Feeling well  4 days of sleep was off, more anxiety since  Has reduced back to normal recently.   No issues with lavela.     Energy good  BM normal            Feels more relaxed overall  Had one episode of more anxiety, was able to clear quickly  Started on Lavela, no issues  Sleep- A  lot better, still waking occ in middle of night, able to get back to sleep;     Energy- good.     BM good.     Started fosamax, had significant bone josefina muscle pain, had to stop. Has FU with PCP for dosing changes            Initial:  Anxiety-  random can wake middle of night  May happen during the day  Can go away quickly or hang for a week  Can feel heart racing, feels sweaty, no nausea, some HA  No diarrhea.   Deep breathing, biking, walking, reading,   Cooking  Tried ashwagandha with no benefit  Was Rx hydroxyzine, but  takes rarely PRN      Sleep- tried to relax 8-10p, not always falling asleep easily. Wakes around 4-430, sometimes falls back asleep.   No meds for sleep    Energy- no issues    Hx of stroke at 14,   Broke both hips from running, dx with osteoporosis. , some pain from surgeries from screws. Occ numbness in right leg to foot post surgery, comes and goes.     Diet:   B: oatmeal, eggs, pb toast, banana  eggs avocado, milk,   L: salad, chickpeas, avocoado,   D; salmon, chicken  Sn: smoothie, yogurt, berries  Dr: water, coffee, latte, tea    BM: daily, well formed.                           Review of Systems   Constitutional:  Negative for chills and fever.   HENT:  Positive for tinnitus (has seen EENT, left is worse). Negative for congestion, ear pain, rhinorrhea and trouble swallowing.    Eyes:  Negative for visual disturbance.   Respiratory:  Negative for cough, shortness of breath and wheezing.    Cardiovascular:  Negative for chest pain and palpitations.   Gastrointestinal:  Negative for constipation, nausea and vomiting.   Genitourinary:  Negative for difficulty urinating and menstrual problem.   Musculoskeletal:  Positive for arthralgias (elbows and hands,).   Skin:  Negative for rash.   Neurological:  Positive for headaches (occ with cycle.).            Provider reviewed plan for the acupuncture session, precautions and contraindications. Patient/guardian/hospital staff has given consent to treat with full understanding of what to expect during the session. Before acupuncture began, provider explained to the patient to communicate at any time if the procedure was causing discomfort past their tolerance level. Patient agreed to advise acupuncturist. The acupuncturist counseled the patient on the risks of acupuncture treatment including pain, infection, bleeding, and no relief of pain. The patient was positioned comfortably. There was no evidence of infection at the site of needle insertions.    Objective    Physical Exam         Row Name 07/03/24 0819   Acupuncture Treatment   Body Points - Bilateral Jina olivia olvera, yin tong, Li4, HT7, SP6, KI6, LR3   Auricular Points Yes   Auricular Points - Bilateral BFA 1-3   Other Techniques Utilized TDP Lamp   TDP Lamp Descripton feet   Needle Count In 20   Needle Count Out 20   Needle Retention Time (min) 25 minutes   Total Face to Face Time (min) 25 minutes                         Assessment/Plan   Diagnoses and all orders for this visit:  Anxiety (Primary)                          Supplement recommendations:  Gina WS 1265 (60 Softgels) ([employer]): Please take  1 gel, twice / day  ongoing (with a full glass of water. If you get lavender burps and they are bothersome, you can but the whole package in the freezer and take the capsules from there. )

## 2024-12-09 DIAGNOSIS — R92.1 BREAST CALCIFICATION SEEN ON MAMMOGRAM: Primary | ICD-10-CM

## 2024-12-13 ENCOUNTER — APPOINTMENT (OUTPATIENT)
Dept: INTEGRATIVE MEDICINE | Facility: CLINIC | Age: 39
End: 2024-12-13
Payer: COMMERCIAL

## 2024-12-16 ENCOUNTER — LAB (OUTPATIENT)
Dept: LAB | Facility: LAB | Age: 39
End: 2024-12-16
Payer: COMMERCIAL

## 2024-12-16 DIAGNOSIS — E53.8 VITAMIN B12 DEFICIENCY: ICD-10-CM

## 2024-12-16 LAB
HCYS SERPL-SCNC: 9.55 UMOL/L (ref 5–13.9)
VIT B12 SERPL-MCNC: 1243 PG/ML (ref 211–911)

## 2024-12-16 PROCEDURE — 83090 ASSAY OF HOMOCYSTEINE: CPT

## 2024-12-16 PROCEDURE — 82607 VITAMIN B-12: CPT

## 2024-12-16 PROCEDURE — 36415 COLL VENOUS BLD VENIPUNCTURE: CPT

## 2024-12-17 NOTE — PROGRESS NOTES
Maribel Amador female   1985 39 y.o.   [id number]      Chief Complaint  Abnormal mammogram    History Of Present Illness  Maribel Amador is a very pleasant 39 y.o.  female [facility] by Corinne Bazella for abnormal mammogram. She has family history of breast cancer in her mother, age 50 with contralateral recurrence age 57 and maternal aunt, 60s. She denies breast surgery or biopsy.    BREAST IMAGIN2024 Bilateral Baseline screening mammogram, indicates BI-RADS Category 0. Left breast calcifications warranting additional views.     REPRODUCTIVE HISTORY: menarche age 11, , first birth age 27,  x 18 months, no OCP's, history of bilateral salpingectomy, premenopausal, regular monthly cycles, heterogeneously dense tissue    FAMILY CANCER HISTORY:   Mother: Breast cancer, age 50, with contralateral recurrence age 57, alive, unsure of Genetic testing  Maternal Aunt (1): Breast cancer, 60s  Maternal Aunt (2): Suspected Breast cancer, 60s    Surgical History  She has no past surgical history on file.     Social History  She reports that she has never smoked. She has never used smokeless tobacco. She reports that she does not drink alcohol and does not use drugs.    Family History  Family History   Problem Relation Name Age of Onset    Breast cancer Mother  50    Bilateral breast cancer Mother  51        twice    Peripheral vascular disease Mother          ulcers on her legs    Diabetes Father      Other (chronic hypertenstion) Father      Hyperlipidemia Father          Allergies  Fluorouracil-adhesive bandage    Medications  Current Outpatient Medications   Medication Instructions    aspirin 81 mg, oral, Daily    cyanocobalamin, vitamin B-12, (VITAMIN B-12 ORAL) Take by mouth.    ergocalciferol (VITAMIN D-2) 1.25 mg, Weekly    ferrous fumarate-vitamin C (Lucia-Sequeles 65-25) 1 tablet, 3 times daily (morning, midday, late afternoon)    hydrOXYzine HCL (ATARAX) 10 mg,  oral, Every 6 hours PRN    ibuprofen 600 mg, oral, Every 6 hours PRN    TURMERIC ORAL Take by mouth.    vitamin k 100 mcg, Daily         REVIEW OF SYSTEMS    Constitutional:  Negative for appetite change, fatigue, fever and unexpected weight change.   HENT:  Negative for ear pain, hearing loss, nosebleeds, sore throat and trouble swallowing.    Eyes:  Negative for discharge, itching and visual disturbance.   Respiratory:  Negative for cough, chest tightness and shortness of breath.    Cardiovascular:  Negative for chest pain, palpitations and leg swelling.   Breast: as indicated in HPI  Gastrointestinal:  Negative for abdominal pain, constipation, diarrhea and nausea.   Endocrine: Negative for cold intolerance and heat intolerance.   Genitourinary:  Negative for dysuria, frequency, hematuria, pelvic pain and vaginal bleeding.   Musculoskeletal:  Negative for arthralgias, back pain, gait problem, joint swelling and myalgias.   Skin:  Negative for color change and rash.   Allergic/Immunologic: Negative for environmental allergies and food allergies.   Neurological:  Negative for dizziness, tremors, speech difficulty, weakness, numbness and headaches.   Hematological:  Does not bruise/bleed easily.   Psychiatric/Behavioral:  Negative for agitation, dysphoric mood and sleep disturbance. The patient is not nervous/anxious.         Past Medical History  She has a past medical history of Abnormal FSH level (09/20/2023), Abnormal LH level (09/20/2023), Cerebral infarction, unspecified (03/24/2015), Muscle contracture (09/20/2023), Pain in right hip (04/24/2015), and Stress fracture of neck of left femur (09/20/2023).     Physical Exam  Patient is alert and oriented x3 and in a relaxed and appropriate mood. Her gait is steady and hand grasps are equal. Sclera is clear. The breasts are nearly symmetrical. The tissue is dense without palpable abnormalities, discrete nodules or masses. The skin and nipples appear normal. There  is no cervical, supraclavicular or axillary lymphadenopathy. Heart rate and rhythm normal, S1 and S2 appreciated. The lungs are clear to auscultation bilaterally. Abdomen is soft and non-tender.       Physical Exam     Last Recorded Vitals  Vitals:    12/30/24 0948   BP: 126/70   Pulse: 97   Resp: 16   Temp: 36.5 °C (97.7 °F)   SpO2: 97%       Relevant Results   Time was spent viewing digital images of the radiology testing with the patient. I explained the results in depth, along with suggested explanation for follow up recommendations based on the testing results. BI-RADS Category 3      Imaging  Narrative & Impression   Interpreted By:  Rosalie Hayden,   STUDY:  BI MAMMO LEFT DIAGNOSTIC;  12/30/2024 10:20 am      ACCESSION NUMBER(S):  [id number]      ORDERING CLINICIAN:  CORINNE BAZELLA      INDICATION:  Patient was recalled from screening mammogram dated 12/04/2024 for  left breast calcifications.      ,R92.1 Mammographic calcification found on diagnostic imaging of  breast      COMPARISON:  12/04/2024.      FINDINGS:  Density:  The breasts are heterogeneously dense, which may obscure  small masses.      Spot magnification images demonstrate loosely grouped round  calcifications in the deep inferior left breast. These could not be  included on the craniocaudal image. The calcifications demonstrate  probably benign morphology.      IMPRESSION:  Probably benign left breast calcifications. Short-term mammogram  follow-up in 6 months is recommended to ensure stability.      BI-RADS CATEGORY:  BI-RADS Category:  3 Probably Benign.  Recommendation:  Short Interval Follow-up.  Recommended Date:  6 Months.  Laterality:  Left.           BI mammo bilateral screening tomosynthesis 12/04/2024    Narrative  Interpreted By:  Dashawn Farmer,  STUDY:  BI MAMMO BILATERAL SCREENING TOMOSYNTHESIS; 12/4/2024 7:02 am    ACCESSION NUMBER(S):  [id number]    ORDERING CLINICIAN:  CORINNE  BRENNA    INDICATION:  Screening.    ,Z01.419 Encounter for gynecological examination (general) (routine)  without abnormal findings    COMPARISON:  None. Baseline.    FINDINGS:  2D and tomosynthesis images were reviewed at 1 mm slice thickness.    Density:  The breasts are heterogeneously dense, which may obscure  small masses.    Indeterminate calcifications are seen posteriorly in the MLO view of  the left breast. This requires further evaluation with a diagnostic  mammogram. No mammographic evidence of malignancy in the right breast.    Impression  Left calcifications    BI-RADS CATEGORY:  BI-RADS Category:  0 Incomplete; Need Additional Imaging Evaluation  Recommendation:  Additional Imaging.  Recommended Date:  Immediate.  Laterality:  Left.    Based on the Tyrer-Cuzick model for breast cancer risk assessment,  this patient is at an elevated risk of developing breast cancer and  may benefit from additional screening with breast MRI or ultrasound.  Please note that this estimate is based on responses provided on the  patient questionnaire. For more information regarding high risk  consultation, please call [phone].    For any future breast imaging appointments, please call [phone] ([phone]).      MACRO:  None    Signed by: Dashawn Farmer 12/7/2024 5:30 PM  Dictation workstation:   [id number]       Assessment/Plan   Normal clinical exam, stable imaging, left breast calcifications, family history of breast cancer, heterogeneously dense tissue    Plan: Return in July 2025 for left diagnostic mammogram and office visit. High risk evaluation at next visit. Encouraged to discuss genetic testing with mother and contact office for referral if needed.     Patient Discussion/Summary  Your clinical examination and imaging are stable. Please return in July 2025 for left diagnostic mammogram and office visit or sooner if you have any problems or concerns.     You can see your health information, review clinical  summaries from office visits & test results online when you follow your health with MY  Chart, a personal health record. To sign up go to [email or web address]. If you need assistance with signing up or trouble getting into your account call [employer] Patient Line 24/7 at [phone].    My office phone number is [phone] if you need to get in touch with me or have additional questions or concerns. Thank you for choosing [facility] and trusting me as your healthcare provider. I look forward to seeing you again at your next office visit. I am honored to be a provider on your health care team and I remain dedicated to helping you achieve your health goals.      Maida Hinojosa, APRN-CNP

## 2024-12-18 ENCOUNTER — APPOINTMENT (OUTPATIENT)
Dept: INTEGRATIVE MEDICINE | Facility: CLINIC | Age: 39
End: 2024-12-18
Payer: COMMERCIAL

## 2024-12-18 VITALS
BODY MASS INDEX: 23.23 KG/M2 | WEIGHT: 115 LBS | SYSTOLIC BLOOD PRESSURE: 96 MMHG | OXYGEN SATURATION: 99 % | HEART RATE: 61 BPM | DIASTOLIC BLOOD PRESSURE: 67 MMHG

## 2024-12-18 DIAGNOSIS — M81.0 OSTEOPOROSIS, UNSPECIFIED OSTEOPOROSIS TYPE, UNSPECIFIED PATHOLOGICAL FRACTURE PRESENCE: Primary | ICD-10-CM

## 2024-12-18 DIAGNOSIS — E55.9 VITAMIN D DEFICIENCY: ICD-10-CM

## 2024-12-18 DIAGNOSIS — E53.8 VITAMIN B12 DEFICIENCY: ICD-10-CM

## 2024-12-18 PROCEDURE — 99214 OFFICE O/P EST MOD 30 MIN: CPT | Performed by: INTERNAL MEDICINE

## 2024-12-18 RX ORDER — ERGOCALCIFEROL 1.25 MG/1
1.25 CAPSULE ORAL WEEKLY
COMMUNITY

## 2024-12-18 ASSESSMENT — ENCOUNTER SYMPTOMS
SLEEP DISTURBANCE: 1
NUMBNESS: 0
ABDOMINAL PAIN: 0
NERVOUS/ANXIOUS: 1
UNEXPECTED WEIGHT CHANGE: 0
FATIGUE: 0
ABDOMINAL DISTENTION: 0
ARTHRALGIAS: 1
DIARRHEA: 0
COUGH: 0
CONSTIPATION: 0
DIZZINESS: 1
FREQUENCY: 0
BACK PAIN: 0

## 2024-12-18 NOTE — PROGRESS NOTES
Integrative Medicine Follow-up Visit :     Subjective   Patient ID: Maribel Amador is a 39 y.o. female who presents for Follow-up       HPI  She has a bit more energy since taking b12. Got her blood work done.   She is doing balance exercisees on her own.   She just had tubal ligation and recovered well.  She has just started walking again.  Thinking about joining a [employer] club.      Tried a new recipe from [employer]. She is striving to get dark green leafy vegetables daily. She puts kale in her soups.   Working on eating almonds. She is eating oatmeal five days per week with prunes and pumpkin seeds.   Drinking more herbal tea.   Could not switch her milk to soy milk. Still drinking cow's milk. Does include edamame in her diet.     Pain:none    Review of Systems   Constitutional:  Negative for fatigue and unexpected weight change.   HENT:  Negative for congestion.    Respiratory:  Negative for cough.    Gastrointestinal:  Negative for abdominal distention, abdominal pain, constipation and diarrhea.   Genitourinary:  Negative for frequency and menstrual problem.        Had difficulty getting pregnant. Almost gave up. Took several years to get pregnant. No miscarriages   Musculoskeletal:  Positive for arthralgias (residual hip pain). Negative for back pain.   Neurological:  Positive for dizziness (not as bad). Negative for numbness (right numbness down right leg sometimes).        Short term memory issues- has trouble with words sometimes.  Can't remember where she put something sometimes.    Psychiatric/Behavioral:  Positive for sleep disturbance (wake in middle of the night with sweats). Negative for suicidal ideas. The patient is nervous/anxious.                   Objective   BP 96/67 (BP Location: Left arm, Patient Position: Sitting, BP Cuff Size: Adult)   Pulse 61   Wt 52.2 kg (115 lb)   LMP 11/22/2024   SpO2 99%   BMI 23.23 kg/m²     Physical Exam  HENT:      Head: Normocephalic and atraumatic.       Mouth/Throat:      Mouth: Mucous membranes are moist.   Cardiovascular:      Rate and Rhythm: Normal rate.   Pulmonary:      Effort: Pulmonary effort is normal. No respiratory distress.   Musculoskeletal:         General: Tenderness (right upper back pain under shoulder, imporved with stretching) present. No swelling or deformity.      Cervical back: Normal range of motion.   Skin:     General: Skin is dry.      Findings: No rash.   Neurological:      General: No focal deficit present.      Mental Status: She is alert and oriented to person, place, and time.   Psychiatric:      Comments: Normal affect                      Assessment/Plan     Problem List Items Addressed This Visit             ICD-10-CM    Osteoporosis - Primary M81.0     Discussed diet and lifestyle.  Food choices. Strength training once she recovers.          Vitamin B12 deficiency E53.8    Vitamin D deficiency E55.9     Continue supplementation. Check level yearly.            Think about going to a hiking club event.   Try out cinnamon on your oats as well.   Keep walking.   Strive to get at least 2 x per week of 30 minutes of strength and balance work.   Daysi Reid 15 minute workouts on [employer].   Keep taking vitamin d.   Make sure you are getting five servings of vegetables and fruits.  Keep eating edamame  Try the green Silk soy milk (organic unsweetened.)  Nutritionfacts.org  soy and osteoporosis  Follow up in April 2025    Recommend Follow up in : 4 months    Silvana Andrade MD PhD    Time Spent  Prep time on day of patient encounter: 2 minutes  Time spent directly with patient, family or caregiver: 30 minutes  Additional Time Spent on Patient Care Activities: 0 minutes  Documentation Time: 4 minutes  Other Time Spent: 0 minutes  Total: 36 minutes

## 2024-12-18 NOTE — PATIENT INSTRUCTIONS
Think about going to a hiking club event.   Try out cinnamon on your oats as well.   Keep walking.   Strive to get at least 2 x per week of 30 minutes of strength and balance work.   Daysi Reid 15 minute workouts on [employer].   Keep taking vitamin d.   Make sure you are getting five servings of vegetables and fruits.  Keep eating edamame  Try the green Silk soy milk (organic unsweetened.)  Nutritionfacts.org  soy and osteoporosis  Follow up in April 2025  Silvana Andrade MD PhD

## 2024-12-20 ENCOUNTER — APPOINTMENT (OUTPATIENT)
Dept: INTEGRATIVE MEDICINE | Facility: CLINIC | Age: 39
End: 2024-12-20
Payer: COMMERCIAL

## 2024-12-20 DIAGNOSIS — F41.9 ANXIETY: Primary | ICD-10-CM

## 2024-12-20 DIAGNOSIS — M81.0 OSTEOPOROSIS, UNSPECIFIED OSTEOPOROSIS TYPE, UNSPECIFIED PATHOLOGICAL FRACTURE PRESENCE: ICD-10-CM

## 2024-12-20 PROCEDURE — 97810 ACUP 1/> WO ESTIM 1ST 15 MIN: CPT | Performed by: NATUROPATH

## 2024-12-20 PROCEDURE — 97811 ACUP 1/> W/O ESTIM EA ADD 15: CPT | Performed by: NATUROPATH

## 2024-12-20 ASSESSMENT — ENCOUNTER SYMPTOMS
CHILLS: 0
TROUBLE SWALLOWING: 0
ARTHRALGIAS: 1
NAUSEA: 0
VOMITING: 0
RHINORRHEA: 0
WHEEZING: 0
DIFFICULTY URINATING: 0
HEADACHES: 1
FEVER: 0
COUGH: 0
CONSTIPATION: 0
SHORTNESS OF BREATH: 0
PALPITATIONS: 0

## 2024-12-20 NOTE — PROGRESS NOTES
Acupuncture Visit:     Subjective   Patient ID: Maribel Amador is a 39 y.o. female who presents for No chief complaint on file.  Sleep still improved   Anxiety low  Pain generally low, weather makes stiff, but better after she gets moving  BM normal          Sleep getting back to normal  Anxiety - better,   No pain  BM normal        Sleep- trouble staying asleep. Bad post surgery  No pain now from procedure  Anxiety increased [place] to poor sleep.         Sleep back to normal, staying asleep   No pain today.   Anxiety- much better,  no little attacks.   BM normal        Sleep off the past few nights. Hard time staying asleep  Bone pain gone  Anxiety- OK  BM- normal.       Previous  Bone pain has cleared  Anxiety- low   Sleep good, off night last night  BM-    Previous  Bone pain is improving from medication, but still present  Worse in AM  Arms and shoulders  Anxiety- improved   Sleep- improved, better since less pain.       Previous  Had same SE with boniva, pain in neck mid back  Elbows, shoulder  Pain reduced since stopping  Anxiety still improved  Sleep- hard due to SE, pain when laying        Sleep has been more consistety  Anxiety ahs been stable.   Started on Boniva today, had bad SE with previous meds  Energy good        Previous  Feeling well  4 days of sleep was off, more anxiety since  Has reduced back to normal recently.   No issues with lavela.     Energy good  BM normal            Feels more relaxed overall  Had one episode of more anxiety, was able to clear quickly  Started on Lavela, no issues  Sleep- A  lot better, still waking occ in middle of night, able to get back to sleep;     Energy- good.     BM good.     Started fosamax, had significant bone josefina muscle pain, had to stop. Has FU with PCP for dosing changes            Initial:  Anxiety-  random can wake middle of night  May happen during the day  Can go away quickly or hang for a week  Can feel heart racing, feels sweaty, no nausea, some  HA  No diarrhea.   Deep breathing, biking, walking, reading,   Cooking  Tried ashwagandha with no benefit  Was Rx hydroxyzine, but takes rarely PRN      Sleep- tried to relax 8-10p, not always falling asleep easily. Wakes around 4-430, sometimes falls back asleep.   No meds for sleep    Energy- no issues    Hx of stroke at 14,   Broke both hips from running, dx with osteoporosis. , some pain from surgeries from screws. Occ numbness in right leg to foot post surgery, comes and goes.     Diet:   B: oatmeal, eggs, pb toast, banana  eggs avocado, milk,   L: salad, chickpeas, avocoado,   D; salmon, chicken  Sn: smoothie, yogurt, berries  Dr: water, coffee, latte, tea    BM: daily, well formed.                 Session Information  Is this acupuncture treatment being billed to the patient's insurance company: Yes  This is visit number: 11  The patient has a total number of visits of: 20  Initial Acupuncture Treatment date: 07/03/24  Name of Insurance Company:   Visit Type: Follow-up visit         Review of Systems   Constitutional:  Negative for chills and fever.   HENT:  Positive for tinnitus (has seen EENT, left is worse). Negative for congestion, ear pain, rhinorrhea and trouble swallowing.    Eyes:  Negative for visual disturbance.   Respiratory:  Negative for cough, shortness of breath and wheezing.    Cardiovascular:  Negative for chest pain and palpitations.   Gastrointestinal:  Negative for constipation, nausea and vomiting.   Genitourinary:  Negative for difficulty urinating and menstrual problem.   Musculoskeletal:  Positive for arthralgias (elbows and hands,).   Skin:  Negative for rash.   Neurological:  Positive for headaches (occ with cycle.).            Provider reviewed plan for the acupuncture session, precautions and contraindications. Patient/guardian/hospital staff has given consent to treat with full understanding of what to expect during the session. Before acupuncture began, provider explained to  the patient to communicate at any time if the procedure was causing discomfort past their tolerance level. Patient agreed to advise acupuncturist. The acupuncturist counseled the patient on the risks of acupuncture treatment including pain, infection, bleeding, and no relief of pain. The patient was positioned comfortably. There was no evidence of infection at the site of needle insertions.    Objective   Physical Exam         Row Name 07/03/24 0819   Acupuncture Treatment   Body Points - Bilateral Jina baker may, yin tong, Li4, HT7, SP6, KI6, LR3   Auricular Points Yes   Auricular Points - Bilateral BFA 1-3   Other Techniques Utilized TDP Lamp   TDP Lamp Descripton feet   Needle Count In 20   Needle Count Out 20   Needle Retention Time (min) 25 minutes   Total Face to Face Time (min) 25 minutes                         Assessment/Plan   Diagnoses and all orders for this visit:  Anxiety (Primary)  Osteoporosis, unspecified osteoporosis type, unspecified pathological fracture presence                            Supplement recommendations:  Lavela WS 1265 (60 Softgels) ([employer]): Please take  1 gel, twice / day  ongoing (with a full glass of water. If you get lavender burps and they are bothersome, you can but the whole package in the freezer and take the capsules from there. )

## 2024-12-23 ENCOUNTER — APPOINTMENT (OUTPATIENT)
Dept: RADIOLOGY | Facility: CLINIC | Age: 39
End: 2024-12-23
Payer: COMMERCIAL

## 2024-12-23 ENCOUNTER — APPOINTMENT (OUTPATIENT)
Dept: SURGICAL ONCOLOGY | Facility: CLINIC | Age: 39
End: 2024-12-23
Payer: COMMERCIAL

## 2024-12-23 ENCOUNTER — APPOINTMENT (OUTPATIENT)
Dept: SLEEP MEDICINE | Facility: CLINIC | Age: 39
End: 2024-12-23
Payer: COMMERCIAL

## 2024-12-30 ENCOUNTER — APPOINTMENT (OUTPATIENT)
Dept: SURGICAL ONCOLOGY | Facility: CLINIC | Age: 39
End: 2024-12-30
Payer: COMMERCIAL

## 2024-12-30 ENCOUNTER — APPOINTMENT (OUTPATIENT)
Dept: RADIOLOGY | Facility: CLINIC | Age: 39
End: 2024-12-30
Payer: COMMERCIAL

## 2024-12-30 ENCOUNTER — HOSPITAL ENCOUNTER (OUTPATIENT)
Dept: RADIOLOGY | Facility: CLINIC | Age: 39
Discharge: HOME | End: 2024-12-30
Payer: COMMERCIAL

## 2024-12-30 ENCOUNTER — OFFICE VISIT (OUTPATIENT)
Dept: SURGICAL ONCOLOGY | Facility: CLINIC | Age: 39
End: 2024-12-30
Payer: COMMERCIAL

## 2024-12-30 ENCOUNTER — TELEPHONE (OUTPATIENT)
Dept: OBSTETRICS AND GYNECOLOGY | Facility: CLINIC | Age: 39
End: 2024-12-30

## 2024-12-30 VITALS
OXYGEN SATURATION: 97 % | DIASTOLIC BLOOD PRESSURE: 70 MMHG | HEART RATE: 97 BPM | SYSTOLIC BLOOD PRESSURE: 126 MMHG | TEMPERATURE: 97.7 F | RESPIRATION RATE: 16 BRPM | BODY MASS INDEX: 23.33 KG/M2 | WEIGHT: 115.52 LBS

## 2024-12-30 DIAGNOSIS — R92.8 ABNORMAL MAMMOGRAM: Primary | ICD-10-CM

## 2024-12-30 DIAGNOSIS — R92.1 BREAST CALCIFICATION SEEN ON MAMMOGRAM: ICD-10-CM

## 2024-12-30 DIAGNOSIS — R92.1 CALCIFICATION OF LEFT BREAST: ICD-10-CM

## 2024-12-30 PROCEDURE — 77065 DX MAMMO INCL CAD UNI: CPT | Mod: LEFT SIDE | Performed by: STUDENT IN AN ORGANIZED HEALTH CARE EDUCATION/TRAINING PROGRAM

## 2024-12-30 PROCEDURE — 99213 OFFICE O/P EST LOW 20 MIN: CPT | Performed by: NURSE PRACTITIONER

## 2024-12-30 PROCEDURE — 77065 DX MAMMO INCL CAD UNI: CPT | Mod: LT

## 2024-12-30 ASSESSMENT — PAIN SCALES - GENERAL: PAINLEVEL_OUTOF10: 0-NO PAIN

## 2024-12-30 NOTE — TELEPHONE ENCOUNTER
12/30/24 LM for pt to let her know I will put the ordwer in the system and if she has any questions to please feel free to call us    ----- Message from Teresita Rodrigues sent at 12/30/2024  1:43 PM EST -----  Regarding: Call the patient and let her know the results, please.  Call the patient and let her know the results, please.  ----- Message -----  From: Interface, Radiology Results In  Sent: 12/30/2024   1:06 PM EST  To: Corinne A Bazella, MD

## 2024-12-30 NOTE — PATIENT INSTRUCTIONS
Your clinical examination and imaging are stable. Please return in July 2025 for left diagnostic mammogram and office visit or sooner if you have any problems or concerns.     You can see your health information, review clinical summaries from office visits & test results online when you follow your health with MY  Chart, a personal health record. To sign up go to [email or web address]. If you need assistance with signing up or trouble getting into your account call [employer] Patient Line 24/7 at [phone].    My office phone number is [phone] if you need to get in touch with me or have additional questions or concerns. Thank you for choosing [facility] and trusting me as your healthcare provider. I look forward to seeing you again at your next office visit. I am honored to be a provider on your health care team and I remain dedicated to helping you achieve your health goals.

## 2025-01-17 ENCOUNTER — APPOINTMENT (OUTPATIENT)
Dept: INTEGRATIVE MEDICINE | Facility: CLINIC | Age: 40
End: 2025-01-17
Payer: COMMERCIAL

## 2025-01-17 DIAGNOSIS — F41.9 ANXIETY: Primary | ICD-10-CM

## 2025-01-17 PROCEDURE — 97811 ACUP 1/> W/O ESTIM EA ADD 15: CPT | Performed by: NATUROPATH

## 2025-01-17 PROCEDURE — 97810 ACUP 1/> WO ESTIM 1ST 15 MIN: CPT | Performed by: NATUROPATH

## 2025-01-17 ASSESSMENT — ENCOUNTER SYMPTOMS
PALPITATIONS: 0
VOMITING: 0
HEADACHES: 1
RHINORRHEA: 0
SHORTNESS OF BREATH: 0
CHILLS: 0
WHEEZING: 0
CONSTIPATION: 0
FEVER: 0
DIFFICULTY URINATING: 0
ARTHRALGIAS: 1
COUGH: 0
NAUSEA: 0
TROUBLE SWALLOWING: 0

## 2025-01-17 NOTE — PROGRESS NOTES
Acupuncture Visit:     Subjective   Patient ID: Maribel Amador is a 39 y.o. female who presents for No chief complaint on file.  Sleeping well  Has been back on bike-  sore but feeling well.  Pain- still sore in knuckles.   BM- low  Anxiety-  good.              Sleep still improved   Anxiety low  Pain generally low, weather makes stiff, but better after she gets moving  BM normal          Sleep getting back to normal  Anxiety - better,   No pain  BM normal        Sleep- trouble staying asleep. Bad post surgery  No pain now from procedure  Anxiety increased [place] to poor sleep.         Sleep back to normal, staying asleep   No pain today.   Anxiety- much better,  no little attacks.   BM normal        Sleep off the past few nights. Hard time staying asleep  Bone pain gone  Anxiety- OK  BM- normal.       Previous  Bone pain has cleared  Anxiety- low   Sleep good, off night last night  BM-    Previous  Bone pain is improving from medication, but still present  Worse in AM  Arms and shoulders  Anxiety- improved   Sleep- improved, better since less pain.       Previous  Had same SE with boniva, pain in neck mid back  Elbows, shoulder  Pain reduced since stopping  Anxiety still improved  Sleep- hard due to SE, pain when laying        Sleep has been more consistety  Anxiety ahs been stable.   Started on Boniva today, had bad SE with previous meds  Energy good        Previous  Feeling well  4 days of sleep was off, more anxiety since  Has reduced back to normal recently.   No issues with lavela.     Energy good  BM normal            Feels more relaxed overall  Had one episode of more anxiety, was able to clear quickly  Started on Lavela, no issues  Sleep- A  lot better, still waking occ in middle of night, able to get back to sleep;     Energy- good.     BM good.     Started fosamax, had significant bone josefina muscle pain, had to stop. Has FU with PCP for dosing changes            Initial:  Anxiety-  random can wake middle  of night  May happen during the day  Can go away quickly or hang for a week  Can feel heart racing, feels sweaty, no nausea, some HA  No diarrhea.   Deep breathing, biking, walking, reading,   Cooking  Tried ashwagandha with no benefit  Was Rx hydroxyzine, but takes rarely PRN      Sleep- tried to relax 8-10p, not always falling asleep easily. Wakes around 4-430, sometimes falls back asleep.   No meds for sleep    Energy- no issues    Hx of stroke at 14,   Broke both hips from running, dx with osteoporosis. , some pain from surgeries from screws. Occ numbness in right leg to foot post surgery, comes and goes.     Diet:   B: oatmeal, eggs, pb toast, banana  eggs avocado, milk,   L: salad, chickpeas, avocoado,   D; salmon, chicken  Sn: smoothie, yogurt, berries  Dr: water, coffee, latte, tea    BM: daily, well formed.                 Session Information  Is this acupuncture treatment being billed to the patient's insurance company: Yes  This is visit number: 1  The patient has a total number of visits of: 20  Initial Acupuncture Treatment date: 01/17/25  Name of Insurance Company:   Visit Type: Follow-up visit  Medical History Reviewed: I have reviewed pertinent medical history in EHR, and no contraindications are present to provide treatment         Review of Systems   Constitutional:  Negative for chills and fever.   HENT:  Positive for tinnitus (has seen EENT, left is worse). Negative for congestion, ear pain, rhinorrhea and trouble swallowing.    Eyes:  Negative for visual disturbance.   Respiratory:  Negative for cough, shortness of breath and wheezing.    Cardiovascular:  Negative for chest pain and palpitations.   Gastrointestinal:  Negative for constipation, nausea and vomiting.   Genitourinary:  Negative for difficulty urinating and menstrual problem.   Musculoskeletal:  Positive for arthralgias (elbows and hands,).   Skin:  Negative for rash.   Neurological:  Positive for headaches (occ with cycle.).             Provider reviewed plan for the acupuncture session, precautions and contraindications. Patient/guardian/hospital staff has given consent to treat with full understanding of what to expect during the session. Before acupuncture began, provider explained to the patient to communicate at any time if the procedure was causing discomfort past their tolerance level. Patient agreed to advise acupuncturist. The acupuncturist counseled the patient on the risks of acupuncture treatment including pain, infection, bleeding, and no relief of pain. The patient was positioned comfortably. There was no evidence of infection at the site of needle insertions.    Objective   Physical Exam         Row Name 07/03/24 0819   Acupuncture Treatment   Body Points - Bilateral Jina baker may, yin tong, Li4, HT7, SP6, KI6, LR3   Auricular Points Yes   Auricular Points - Bilateral BFA 1-3   Other Techniques Utilized TDP Lamp   TDP Lamp Descripton feet   Needle Count In 20   Needle Count Out 20   Needle Retention Time (min) 25 minutes   Total Face to Face Time (min) 25 minutes                         Assessment/Plan   Diagnoses and all orders for this visit:  Anxiety (Primary)                              Supplement recommendations:  Gina WS 1265 (60 Softgels) ([employer]): Please take  1 gel, twice / day  ongoing (with a full glass of water. If you get lavender burps and they are bothersome, you can but the whole package in the freezer and take the capsules from there. )

## 2025-01-24 ENCOUNTER — APPOINTMENT (OUTPATIENT)
Dept: INTEGRATIVE MEDICINE | Facility: CLINIC | Age: 40
End: 2025-01-24
Payer: COMMERCIAL

## 2025-01-24 DIAGNOSIS — F41.9 ANXIETY: Primary | ICD-10-CM

## 2025-01-24 PROCEDURE — 97810 ACUP 1/> WO ESTIM 1ST 15 MIN: CPT | Performed by: NATUROPATH

## 2025-01-24 PROCEDURE — 97811 ACUP 1/> W/O ESTIM EA ADD 15: CPT | Performed by: NATUROPATH

## 2025-01-24 ASSESSMENT — ENCOUNTER SYMPTOMS
CHILLS: 0
COUGH: 0
VOMITING: 0
CONSTIPATION: 0
DIFFICULTY URINATING: 0
FEVER: 0
RHINORRHEA: 0
NAUSEA: 0
ARTHRALGIAS: 1
TROUBLE SWALLOWING: 0
HEADACHES: 1
PALPITATIONS: 0
WHEEZING: 0
SHORTNESS OF BREATH: 0

## 2025-01-24 NOTE — PROGRESS NOTES
Acupuncture Visit:     Subjective   Patient ID: Maribel Amador is a 39 y.o. female who presents for No chief complaint on file.  Sleep still off, work shift has been irregular  Still pain in knuckles  Anxiety is generally low  BM good.     previous  Sleeping well  Has been back on bike-  sore but feeling well.  Pain- still sore in knuckles.   BM- low  Anxiety-  good.              Sleep still improved   Anxiety low  Pain generally low, weather makes stiff, but better after she gets moving  BM normal          Sleep getting back to normal  Anxiety - better,   No pain  BM normal        Sleep- trouble staying asleep. Bad post surgery  No pain now from procedure  Anxiety increased [place] to poor sleep.         Sleep back to normal, staying asleep   No pain today.   Anxiety- much better,  no little attacks.   BM normal        Sleep off the past few nights. Hard time staying asleep  Bone pain gone  Anxiety- OK  BM- normal.       Previous  Bone pain has cleared  Anxiety- low   Sleep good, off night last night  BM-    Previous  Bone pain is improving from medication, but still present  Worse in AM  Arms and shoulders  Anxiety- improved   Sleep- improved, better since less pain.       Previous  Had same SE with boniva, pain in neck mid back  Elbows, shoulder  Pain reduced since stopping  Anxiety still improved  Sleep- hard due to SE, pain when laying        Sleep has been more consistety  Anxiety ahs been stable.   Started on Boniva today, had bad SE with previous meds  Energy good        Previous  Feeling well  4 days of sleep was off, more anxiety since  Has reduced back to normal recently.   No issues with lavela.     Energy good  BM normal            Feels more relaxed overall  Had one episode of more anxiety, was able to clear quickly  Started on Lavela, no issues  Sleep- A  lot better, still waking occ in middle of night, able to get back to sleep;     Energy- good.     BM good.     Started fosamax, had significant bone  josefina muscle pain, had to stop. Has FU with PCP for dosing changes            Initial:  Anxiety-  random can wake middle of night  May happen during the day  Can go away quickly or hang for a week  Can feel heart racing, feels sweaty, no nausea, some HA  No diarrhea.   Deep breathing, biking, walking, reading,   Cooking  Tried ashwagandha with no benefit  Was Rx hydroxyzine, but takes rarely PRN      Sleep- tried to relax 8-10p, not always falling asleep easily. Wakes around 4-430, sometimes falls back asleep.   No meds for sleep    Energy- no issues    Hx of stroke at 14,   Broke both hips from running, dx with osteoporosis. , some pain from surgeries from screws. Occ numbness in right leg to foot post surgery, comes and goes.     Diet:   B: oatmeal, eggs, pb toast, banana  eggs avocado, milk,   L: salad, chickpeas, avocoado,   D; salmon, chicken  Sn: smoothie, yogurt, berries  Dr: water, coffee, latte, tea    BM: daily, well formed.                 Session Information  Is this acupuncture treatment being billed to the patient's insurance company: Yes  This is visit number: 2  The patient has a total number of visits of: 20  Initial Acupuncture Treatment date: 01/17/25  Name of Insurance Company:   Visit Type: Follow-up visit  Medical History Reviewed: I have reviewed pertinent medical history in EHR, and no contraindications are present to provide treatment         Review of Systems   Constitutional:  Negative for chills and fever.   HENT:  Positive for tinnitus (has seen EENT, left is worse). Negative for congestion, ear pain, rhinorrhea and trouble swallowing.    Eyes:  Negative for visual disturbance.   Respiratory:  Negative for cough, shortness of breath and wheezing.    Cardiovascular:  Negative for chest pain and palpitations.   Gastrointestinal:  Negative for constipation, nausea and vomiting.   Genitourinary:  Negative for difficulty urinating and menstrual problem.   Musculoskeletal:  Positive for  arthralgias (elbows and hands,).   Skin:  Negative for rash.   Neurological:  Positive for headaches (occ with cycle.).            Provider reviewed plan for the acupuncture session, precautions and contraindications. Patient/guardian/hospital staff has given consent to treat with full understanding of what to expect during the session. Before acupuncture began, provider explained to the patient to communicate at any time if the procedure was causing discomfort past their tolerance level. Patient agreed to advise acupuncturist. The acupuncturist counseled the patient on the risks of acupuncture treatment including pain, infection, bleeding, and no relief of pain. The patient was positioned comfortably. There was no evidence of infection at the site of needle insertions.    Objective   Physical Exam         Row Name 07/03/24 0819   Acupuncture Treatment   Body Points - Bilateral Jina baker may, yin tong, Li4, HT7, SP6, KI6, LR3   Auricular Points Yes   Auricular Points - Bilateral BFA 1-3   Other Techniques Utilized TDP Lamp   TDP Lamp Descripton feet   Needle Count In 20   Needle Count Out 20   Needle Retention Time (min) 25 minutes   Total Face to Face Time (min) 25 minutes                         Assessment/Plan   Diagnoses and all orders for this visit:  Anxiety (Primary)                                Supplement recommendations:  Gina WS 1265 (60 Softgels) ([employer]): Please take  1 gel, twice / day  ongoing (with a full glass of water. If you get lavender burps and they are bothersome, you can but the whole package in the freezer and take the capsules from there. )

## 2025-02-04 ENCOUNTER — APPOINTMENT (OUTPATIENT)
Dept: INTEGRATIVE MEDICINE | Facility: CLINIC | Age: 40
End: 2025-02-04
Payer: COMMERCIAL

## 2025-02-07 ENCOUNTER — APPOINTMENT (OUTPATIENT)
Dept: INTEGRATIVE MEDICINE | Facility: CLINIC | Age: 40
End: 2025-02-07
Payer: COMMERCIAL

## 2025-02-07 DIAGNOSIS — F41.9 ANXIETY: Primary | ICD-10-CM

## 2025-02-07 PROCEDURE — 97811 ACUP 1/> W/O ESTIM EA ADD 15: CPT | Performed by: NATUROPATH

## 2025-02-07 PROCEDURE — 97810 ACUP 1/> WO ESTIM 1ST 15 MIN: CPT | Performed by: NATUROPATH

## 2025-02-07 ASSESSMENT — ENCOUNTER SYMPTOMS
HEADACHES: 1
WHEEZING: 0
TROUBLE SWALLOWING: 0
NAUSEA: 0
SHORTNESS OF BREATH: 0
CONSTIPATION: 0
ARTHRALGIAS: 1
FEVER: 0
COUGH: 0
RHINORRHEA: 0
PALPITATIONS: 0
DIFFICULTY URINATING: 0
VOMITING: 0
CHILLS: 0

## 2025-02-07 NOTE — PROGRESS NOTES
Acupuncture Visit:     Subjective   Patient ID: Maribel Amador is a 39 y.o. female who presents for No chief complaint on file.  Poor sleep last night,  kept waking up  Rest of the week was OK  Anxiety- OK, feels related to cycle mostly now  BM- good.       Sleep still off, work shift has been irregular  Still pain in knuckles  Anxiety is generally low  BM good.     previous  Sleeping well  Has been back on bike-  sore but feeling well.  Pain- still sore in knuckles.   BM- low  Anxiety-  good.              Sleep still improved   Anxiety low  Pain generally low, weather makes stiff, but better after she gets moving  BM normal          Sleep getting back to normal  Anxiety - better,   No pain  BM normal        Sleep- trouble staying asleep. Bad post surgery  No pain now from procedure  Anxiety increased [place] to poor sleep.         Sleep back to normal, staying asleep   No pain today.   Anxiety- much better,  no little attacks.   BM normal        Sleep off the past few nights. Hard time staying asleep  Bone pain gone  Anxiety- OK  BM- normal.       Previous  Bone pain has cleared  Anxiety- low   Sleep good, off night last night  BM-    Previous  Bone pain is improving from medication, but still present  Worse in AM  Arms and shoulders  Anxiety- improved   Sleep- improved, better since less pain.       Previous  Had same SE with boniva, pain in neck mid back  Elbows, shoulder  Pain reduced since stopping  Anxiety still improved  Sleep- hard due to SE, pain when laying        Sleep has been more consistety  Anxiety ahs been stable.   Started on Boniva today, had bad SE with previous meds  Energy good        Previous  Feeling well  4 days of sleep was off, more anxiety since  Has reduced back to normal recently.   No issues with lavela.     Energy good  BM normal            Feels more relaxed overall  Had one episode of more anxiety, was able to clear quickly  Started on Lavela, no issues  Sleep- A  lot better, still  waking occ in middle of night, able to get back to sleep;     Energy- good.     BM good.     Started fosamax, had significant bone josefina muscle pain, had to stop. Has FU with PCP for dosing changes            Initial:  Anxiety-  random can wake middle of night  May happen during the day  Can go away quickly or hang for a week  Can feel heart racing, feels sweaty, no nausea, some HA  No diarrhea.   Deep breathing, biking, walking, reading,   Cooking  Tried ashwagandha with no benefit  Was Rx hydroxyzine, but takes rarely PRN      Sleep- tried to relax 8-10p, not always falling asleep easily. Wakes around 4-430, sometimes falls back asleep.   No meds for sleep    Energy- no issues    Hx of stroke at 14,   Broke both hips from running, dx with osteoporosis. , some pain from surgeries from screws. Occ numbness in right leg to foot post surgery, comes and goes.     Diet:   B: oatmeal, eggs, pb toast, banana  eggs avocado, milk,   L: salad, chickpeas, avocoado,   D; salmon, chicken  Sn: smoothie, yogurt, berries  Dr: water, coffee, latte, tea    BM: daily, well formed.                 Session Information  Is this acupuncture treatment being billed to the patient's insurance company: Yes  This is visit number: 3  The patient has a total number of visits of: 20  Initial Acupuncture Treatment date: 01/17/25  Name of Insurance Company:   Visit Type: Follow-up visit  Medical History Reviewed: I have reviewed pertinent medical history in EHR, and no contraindications are present to provide treatment         Review of Systems   Constitutional:  Negative for chills and fever.   HENT:  Positive for tinnitus (has seen EENT, left is worse). Negative for congestion, ear pain, rhinorrhea and trouble swallowing.    Eyes:  Negative for visual disturbance.   Respiratory:  Negative for cough, shortness of breath and wheezing.    Cardiovascular:  Negative for chest pain and palpitations.   Gastrointestinal:  Negative for constipation,  nausea and vomiting.   Genitourinary:  Negative for difficulty urinating and menstrual problem.   Musculoskeletal:  Positive for arthralgias (elbows and hands,).   Skin:  Negative for rash.   Neurological:  Positive for headaches (occ with cycle.).            Provider reviewed plan for the acupuncture session, precautions and contraindications. Patient/guardian/hospital staff has given consent to treat with full understanding of what to expect during the session. Before acupuncture began, provider explained to the patient to communicate at any time if the procedure was causing discomfort past their tolerance level. Patient agreed to advise acupuncturist. The acupuncturist counseled the patient on the risks of acupuncture treatment including pain, infection, bleeding, and no relief of pain. The patient was positioned comfortably. There was no evidence of infection at the site of needle insertions.    Objective   Physical Exam         Row Name 07/03/24 0819   Acupuncture Treatment   Body Points - Bilateral Jina baker may, yin tong, Li4, HT7, SP6, KI6, LR3   Auricular Points Yes   Auricular Points - Bilateral BFA 1-3   Other Techniques Utilized TDP Lamp   TDP Lamp Descripton feet   Needle Count In 20   Needle Count Out 20   Needle Retention Time (min) 25 minutes   Total Face to Face Time (min) 25 minutes                         Assessment/Plan   Diagnoses and all orders for this visit:  Anxiety (Primary)                                  Supplement recommendations:  Gina WS 1265 (60 Softgels) ([employer]): Please take  1 gel, twice / day  ongoing (with a full glass of water. If you get lavender burps and they are bothersome, you can but the whole package in the freezer and take the capsules from there. )

## 2025-03-03 ENCOUNTER — HOSPITAL ENCOUNTER (OUTPATIENT)
Dept: RADIOLOGY | Facility: CLINIC | Age: 40
Discharge: HOME | End: 2025-03-03
Payer: COMMERCIAL

## 2025-03-03 DIAGNOSIS — Z87.81 HISTORY OF FEMUR FRACTURE: ICD-10-CM

## 2025-03-03 PROCEDURE — 72170 X-RAY EXAM OF PELVIS: CPT

## 2025-03-03 PROCEDURE — 73552 X-RAY EXAM OF FEMUR 2/>: CPT | Mod: 50

## 2025-03-05 ENCOUNTER — OFFICE VISIT (OUTPATIENT)
Dept: ORTHOPEDIC SURGERY | Facility: CLINIC | Age: 40
End: 2025-03-05
Payer: COMMERCIAL

## 2025-03-05 DIAGNOSIS — Z87.81 HISTORY OF FEMUR FRACTURE: Primary | ICD-10-CM

## 2025-03-05 DIAGNOSIS — M80.859A PATHOLOGICAL FRACTURE OF FEMUR DUE TO SECONDARY OSTEOPOROSIS (MULTI): ICD-10-CM

## 2025-03-05 PROCEDURE — 1036F TOBACCO NON-USER: CPT | Performed by: ORTHOPAEDIC SURGERY

## 2025-03-05 PROCEDURE — 99213 OFFICE O/P EST LOW 20 MIN: CPT | Performed by: ORTHOPAEDIC SURGERY

## 2025-03-05 PROCEDURE — RXMED WILLOW AMBULATORY MEDICATION CHARGE

## 2025-03-05 RX ORDER — IBUPROFEN 600 MG/1
600 TABLET ORAL 3 TIMES DAILY PRN
Qty: 60 TABLET | Refills: 5 | Status: SHIPPED | OUTPATIENT
Start: 2025-03-05 | End: 2026-03-05

## 2025-03-05 NOTE — PROGRESS NOTES
New patient based on time but established patient with me with bilateral femoral stress fractures treated with prophylactic nailing.  She presents today with new left distal thigh pain.  Going on for about 3 weeks or so.  She had 1 instance of buckling.  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  She is been doing well up until this recent point.  She was doing activities and running up until this point as well.  No pain in her groin.  Her pain is mostly noted after her activities and workouts.  She has no pain at rest.  No pain for See in the morning.    The patient does not endorse fevers and chills. The patient does not endorse any change in her vision or hearing. They do not endorse chest pain, shortness of breath. The patient does not endorse any abdominal discomfort. They do not endorse any skin irritation or lesions. They do not endorse any new numbness and tingling or as otherwise stated in the history of present illness.    Gen: The patient is alert and oriented ×3, is in no acute distress, and appear their stated age and weight.    Psychiatric: Mood and affect are appropriate.    Eyes: Sclera are white, and pupils are round and symmetric.    ENT: Mucous membranes are moist.     Neck: Supple. Thyroid is midline.    Respiratory: Respirations are nonlabored, chest rise is symmetric.    Cardiac: Rate is regular by palpation of distal pulses.     Abdomen: Nondistended.    Integument: No obvious cutaneous lesions are noted. No signs of lymphangitis. No signs of systemic edema.    Musculoskeletal examination of her left lower extremity demonstrates no pain over her proximal incisions.  No pain over her distal incisions.  No concern for wound healing.  She has some discomfort to palpation in the anterior bulk of her distal quadriceps.  No pain at the medial or lateral joint lines.  Mild discomfort with patellar translation.    Bilateral femoral x-rays demonstrate stable hardware with no real  change.  No concern for additional stress fracture which was her primary concern.    39-year-old female with bilateral previous stress fractures treated with prophylactic nailing.  I would like to get in for some physical therapy for some quadriceps weakness and inhibition to help work through the discomfort and get her on a prescription anti-inflammatory.  She can follow-up with me via email or message in 6 to 8 weeks to see how she is doing.  I would repeat an MRI of her knee joint and femur if she was still symptomatic.    Natural History reviewed. All questions answered. The patient was in agreement with the plan.      **This note was created using voice recognition software and was not corrected for typographical or grammatical errors.**

## 2025-03-06 ENCOUNTER — PHARMACY VISIT (OUTPATIENT)
Dept: PHARMACY | Facility: CLINIC | Age: 40
End: 2025-03-06
Payer: COMMERCIAL

## 2025-03-11 NOTE — PROGRESS NOTES
Physical Therapy  Physical Therapy Orthopedic Evaluation    Patient Name: Maribel Amador  MRN: [id number]  Today's Date: 3/13/2025  Time Calculation  Start Time: 0700  Stop Time: 0735  Time Calculation (min): 35 min    Referring Physician: Dr. Patrick Badillo  Visit #: 1 of ?  Approved # of visits: ?  Auth dates: ?  Insurance:  insurance, auth needed      Current Problem  1. Pathological fracture of femur due to secondary osteoporosis (Multi)  Referral to Physical Therapy    Follow Up In Physical Therapy      2. History of femur fracture  Referral to Physical Therapy          Medical history form reviewed: Yes  DOI: 2/1/25    Subjective:   Patient with hx of osteoporosis and prophylactic B femur nail fixation due to stress fx 2021, 2022.   Patient with complaint of L distal thigh pain start of January after starting biking. Treated with rest and stretch with min relief.  Mid-Feb. L knee gave out when going from sit to stand. No other instance of giving out. No catch, no swelling.  Currently, doing better. Stopped biking, but started walking 1-2 miles at slower pace.       Precautions  STEADI Fall Risk Score (The score of 4 or more indicates an increased risk of falling): 1  Pain:  0-10 (Numeric) Pain Score: 1    Pain Exacerbating Factors: advanced activity    Pain Relieving Factors: rest, ice    Imaging completed: X-ray: intact fixation B femur from past sx    Exercise: running    Patient Goals for Treatment: get stronger, return to full activity    Work Status: radiology tech  Current status (improving, unchanged, worsening): unchanged    Current Level of Function: 95%    Patient aware of diagnosis and prognosis: Yes    Living Environment: not a concern  Social Support: Lives with boy friend    Language: English  Medical History Form: Reviewed (scanned into chart)          Objective:  Posture: increased lumbar lordosis, pronation B  Palpation: no pain to palpation B thigh/knee  Gait: normal  Balance: SLS x5 sec  R/L  Squat: + weakness, no pain, slight guarding  Trendelenburg: + B  LE Strength: 4 B  Core Strength: 4  Sensation: intact  LE AROM: WNL  Patellar Mobility: WNL  LE Flexibility: tight hamstring, quad B    Outcome Measures:  Other Measures  Lower Extremity Funtional Score (LEFS): 79/80             EDUCATION: home exercise program, plan of care, activity modifications, pain management, and injury pathology       Goals:  Active       PT Problem       STG       Start:  03/13/25    Expected End:  06/11/25       STG's to be achieved in 6 weeks    1. Decrease L leg pain 50% with activity  2. Increase  LEFS by 1 point to help improve ADL's  3. Increase core/LE strength 1/2 muscle grade to help improve endurance  4. Increase flexibility hamstring/quad by 50% for improved daily function  5. Patient to consider orthotics for walking shoes  6. Demonstrate proper posture with exercise             LTG       Start:  03/13/25    Expected End:  06/11/25       LTG's to be achieved in 12 weeks    1. Decrease L leg pain to tolerable with activity  2. Patient to have no incidents of L knee give way  3. Increase core/LE strength 1 muscle grade to help improve endurance  4. Increase flexibility hamstring/quad to WNL for improved daily function  5. Patient able to resume past level of activity without increase in symptoms  6. Patient to be independent in daily HEP                    Treatments:   Patient instructed in HEP.   Access Code: [email or web address]  URL: [email or web address]  Date: 03/13/2025  Prepared by: Pascale Ramos    Exercises  - Active Straight Leg Raise with Quad Set  - 1 x daily - 7 x weekly - 1-3 sets - 10 reps  - Straight leg raise abduction  - 1 x daily - 7 x weekly - 1-3 sets - 10 reps  - straight leg raise extension  - 1 x daily - 7 x weekly - 1-3 sets - 10 reps  - straight leg raise adduction  - 1 x daily - 7 x weekly - 1-3 sets - 10 reps  - Beginner Bridge  - 1 x daily - 7 x weekly - 1-3 sets - 10  reps  - hamstring stretch standing  - 1 x daily - 7 x weekly - 2 reps - 20 hold  - Quad stretch standing  - 1 x daily - 7 x weekly - 2 reps - 20 hold  Patient provided with written HEP.      Charges: 1 eval-low, 1 TE  Clinical presentation:  stable    Assessment: Patient is a 39 y.o. y/o female  with complaint of L thigh pain and weakness. Patient presents with impaired balance, posture and decreased endurance. Patient exhibits weakness core/LE and tightness hamstrings and quads. Pt would benefit from physical therapy to address the impairments found & listed previously in the objective section in order to return to safe and pain-free ADLs and prior level of function.       Plan:   Rehab Potential: Good  Plan of Care Agreement: Patient  Planned Interventions include: therapeutic exercise, self-care home management, manual therapy, therapeutic activities, gait training, neuromuscular coordination, aquatic therapy  Frequency: 2x/wk  Duration: 12 wks        Pascale Ramos, PT, OCS

## 2025-03-12 ENCOUNTER — APPOINTMENT (OUTPATIENT)
Dept: INTEGRATIVE MEDICINE | Facility: CLINIC | Age: 40
End: 2025-03-12
Payer: COMMERCIAL

## 2025-03-12 ASSESSMENT — ENCOUNTER SYMPTOMS
COUGH: 0
CHILLS: 0
RHINORRHEA: 0
HEADACHES: 1
ARTHRALGIAS: 1
CONSTIPATION: 0
VOMITING: 0
DIFFICULTY URINATING: 0
PALPITATIONS: 0
SHORTNESS OF BREATH: 0
NAUSEA: 0
WHEEZING: 0
FEVER: 0
TROUBLE SWALLOWING: 0

## 2025-03-12 NOTE — PROGRESS NOTES
Acupuncture Visit:     Subjective   Patient ID: Maribel Amador is a 39 y.o. female who presents for No chief complaint on file.  Some knee pain recently, more than in past  Has seen ortho  Sleep- doing well.   Anxiety- sill mostly related to cycle, but good.   BM good  Energy good.         Poor sleep last night,  kept waking up  Rest of the week was OK  Anxiety- OK, feels related to cycle mostly now  BM- good.       Sleep still off, work shift has been irregular  Still pain in knuckles  Anxiety is generally low  BM good.     previous  Sleeping well  Has been back on bike-  sore but feeling well.  Pain- still sore in knuckles.   BM- low  Anxiety-  good.              Sleep still improved   Anxiety low  Pain generally low, weather makes stiff, but better after she gets moving  BM normal          Sleep getting back to normal  Anxiety - better,   No pain  BM normal        Sleep- trouble staying asleep. Bad post surgery  No pain now from procedure  Anxiety increased [place] to poor sleep.         Sleep back to normal, staying asleep   No pain today.   Anxiety- much better,  no little attacks.   BM normal        Sleep off the past few nights. Hard time staying asleep  Bone pain gone  Anxiety- OK  BM- normal.       Previous  Bone pain has cleared  Anxiety- low   Sleep good, off night last night  BM-    Previous  Bone pain is improving from medication, but still present  Worse in AM  Arms and shoulders  Anxiety- improved   Sleep- improved, better since less pain.       Previous  Had same SE with boniva, pain in neck mid back  Elbows, shoulder  Pain reduced since stopping  Anxiety still improved  Sleep- hard due to SE, pain when laying        Sleep has been more consistety  Anxiety ahs been stable.   Started on Boniva today, had bad SE with previous meds  Energy good        Previous  Feeling well  4 days of sleep was off, more anxiety since  Has reduced back to normal recently.   No issues with lavela.     Energy good  BM  normal            Feels more relaxed overall  Had one episode of more anxiety, was able to clear quickly  Started on Lavela, no issues  Sleep- A  lot better, still waking occ in middle of night, able to get back to sleep;     Energy- good.     BM good.     Started fosamax, had significant bone josefina muscle pain, had to stop. Has FU with PCP for dosing changes            Initial:  Anxiety-  random can wake middle of night  May happen during the day  Can go away quickly or hang for a week  Can feel heart racing, feels sweaty, no nausea, some HA  No diarrhea.   Deep breathing, biking, walking, reading,   Cooking  Tried ashwagandha with no benefit  Was Rx hydroxyzine, but takes rarely PRN      Sleep- tried to relax 8-10p, not always falling asleep easily. Wakes around 4-430, sometimes falls back asleep.   No meds for sleep    Energy- no issues    Hx of stroke at 14,   Broke both hips from running, dx with osteoporosis. , some pain from surgeries from screws. Occ numbness in right leg to foot post surgery, comes and goes.     Diet:   B: oatmeal, eggs, pb toast, banana  eggs avocado, milk,   L: salad, chickpeas, avocoado,   D; salmon, chicken  Sn: smoothie, yogurt, berries  Dr: water, coffee, latte, tea    BM: daily, well formed.                 Session Information  Is this acupuncture treatment being billed to the patient's insurance company: Yes  This is visit number: 4  The patient has a total number of visits of: 20  Initial Acupuncture Treatment date: 01/17/25  Name of Insurance Company:   Visit Type: Follow-up visit  Medical History Reviewed: I have reviewed pertinent medical history in EHR, and no contraindications are present to provide treatment         Review of Systems   Constitutional:  Negative for chills and fever.   HENT:  Positive for tinnitus (has seen EENT, left is worse). Negative for congestion, ear pain, rhinorrhea and trouble swallowing.    Eyes:  Negative for visual disturbance.   Respiratory:   Negative for cough, shortness of breath and wheezing.    Cardiovascular:  Negative for chest pain and palpitations.   Gastrointestinal:  Negative for constipation, nausea and vomiting.   Genitourinary:  Negative for difficulty urinating and menstrual problem.   Musculoskeletal:  Positive for arthralgias (elbows and hands,).   Skin:  Negative for rash.   Neurological:  Positive for headaches (occ with cycle.).            Provider reviewed plan for the acupuncture session, precautions and contraindications. Patient/guardian/hospital staff has given consent to treat with full understanding of what to expect during the session. Before acupuncture began, provider explained to the patient to communicate at any time if the procedure was causing discomfort past their tolerance level. Patient agreed to advise acupuncturist. The acupuncturist counseled the patient on the risks of acupuncture treatment including pain, infection, bleeding, and no relief of pain. The patient was positioned comfortably. There was no evidence of infection at the site of needle insertions.    Objective   Physical Exam         Row Name 07/03/24 0819   Acupuncture Treatment   Body Points - Bilateral Jina baker may, yin tong, Li4, HT7, SP6, KI6, LR3   Auricular Points Yes   Auricular Points - Bilateral BFA 1-3   Other Techniques Utilized TDP Lamp   TDP Lamp Descripton feet   Needle Count In 20   Needle Count Out 20   Needle Retention Time (min) 25 minutes   Total Face to Face Time (min) 25 minutes                         Assessment/Plan   There are no diagnoses linked to this encounter.                                  Supplement recommendations:  Gina FALCON 1265 (60 Softgels) ([employer]): Please take  1 gel, twice / day  ongoing (with a full glass of water. If you get lavender burps and they are bothersome, you can but the whole package in the freezer and take the capsules from there. )

## 2025-03-13 ENCOUNTER — EVALUATION (OUTPATIENT)
Dept: PHYSICAL THERAPY | Facility: CLINIC | Age: 40
End: 2025-03-13
Payer: COMMERCIAL

## 2025-03-13 DIAGNOSIS — M80.859A PATHOLOGICAL FRACTURE OF FEMUR DUE TO SECONDARY OSTEOPOROSIS (MULTI): ICD-10-CM

## 2025-03-13 DIAGNOSIS — Z87.81 HISTORY OF FEMUR FRACTURE: ICD-10-CM

## 2025-03-13 PROCEDURE — 97161 PT EVAL LOW COMPLEX 20 MIN: CPT | Mod: GP

## 2025-03-13 PROCEDURE — 97110 THERAPEUTIC EXERCISES: CPT | Mod: GP

## 2025-03-13 ASSESSMENT — ENCOUNTER SYMPTOMS
DEPRESSION: 0
OCCASIONAL FEELINGS OF UNSTEADINESS: 1
LOSS OF SENSATION IN FEET: 0

## 2025-03-13 ASSESSMENT — PAIN SCALES - GENERAL: PAINLEVEL_OUTOF10: 1

## 2025-03-18 ENCOUNTER — TREATMENT (OUTPATIENT)
Dept: PHYSICAL THERAPY | Facility: CLINIC | Age: 40
End: 2025-03-18
Payer: COMMERCIAL

## 2025-03-18 PROCEDURE — 97110 THERAPEUTIC EXERCISES: CPT | Mod: GP,CQ | Performed by: SPECIALIST/TECHNOLOGIST

## 2025-03-18 PROCEDURE — 97112 NEUROMUSCULAR REEDUCATION: CPT | Mod: GP,CQ | Performed by: SPECIALIST/TECHNOLOGIST

## 2025-03-18 ASSESSMENT — PAIN SCALES - GENERAL: PAINLEVEL_OUTOF10: 1

## 2025-03-18 ASSESSMENT — PAIN - FUNCTIONAL ASSESSMENT: PAIN_FUNCTIONAL_ASSESSMENT: 0-10

## 2025-03-18 NOTE — PROGRESS NOTES
"Physical Therapy  Physical Therapy Treatment    Patient Name: Maribel Amador  MRN: [id number]  Today's Date: 3/18/2025  Time Calculation  Start Time: 0700  Stop Time: 0745  Time Calculation (min): 45 min    Insurance:  Visit number: 2 of 15  Authorization info: Auth needed after evaluation  Insurance Type:  Traditional  Cert date start: 3/13/25        Cert date end: 6/11/25                General  Reason for Referral: L thigh pain  Referred By: TREE Badillo MD    Current Problem  No diagnosis found.    Precautions  STEADI Fall Risk Score (The score of 4 or more indicates an increased risk of falling): 1    Pain Assessment: 0-10  0-10 (Numeric) Pain Score: 1    Subjective:   Patient reports mild thigh pain attributed to recent weather change.  Patient notes some soreness after HEP.  HEP Performed:  Yes    Objective:   Anatomic LLD L>R 1/2\"  Pirif WNL  Hams 40°  Quads R 6\" L 8\"   Hip ext R~L WFL  MMT hip ext 3, hip abd 3 hip add 3     Girth   Suprapatellar L<R 0.5 cm   Mid thigh L<R 1.0 cm      Treatments:   Stepper L2 5 min   1/4\" lift on R   DBE 2x2 min   1/2\" riser wall sits 10x10s R/L   Hip ext R/L 44#/44# 20x  Hip abd/add 17.5#/17.5# 20x  Leg Press 40# 2x20  SS R/L Hams, Quads 1'   Swisswing Quads 2'       Charges: TE x2, NME     Assessment: Patient tolerated intensity with mild fatigue noting feeling much looser after swisswing.        Plan: Continue to improve ROM, strength, flexibility and balance to improve gait and ADL      Rayray Best PTA  "

## 2025-03-27 ENCOUNTER — TREATMENT (OUTPATIENT)
Dept: PHYSICAL THERAPY | Facility: CLINIC | Age: 40
End: 2025-03-27
Payer: COMMERCIAL

## 2025-03-27 DIAGNOSIS — M80.859A PATHOLOGICAL FRACTURE OF FEMUR DUE TO SECONDARY OSTEOPOROSIS (MULTI): ICD-10-CM

## 2025-03-27 PROCEDURE — 97112 NEUROMUSCULAR REEDUCATION: CPT | Mod: GP,CQ | Performed by: SPECIALIST/TECHNOLOGIST

## 2025-03-27 PROCEDURE — 97110 THERAPEUTIC EXERCISES: CPT | Mod: GP,CQ | Performed by: SPECIALIST/TECHNOLOGIST

## 2025-03-27 ASSESSMENT — PAIN - FUNCTIONAL ASSESSMENT: PAIN_FUNCTIONAL_ASSESSMENT: 0-10

## 2025-03-27 ASSESSMENT — PAIN SCALES - GENERAL: PAINLEVEL_OUTOF10: 1

## 2025-03-27 NOTE — PROGRESS NOTES
"Physical Therapy  Physical Therapy Treatment    Patient Name: Maribel Amador  MRN: [id number]  Today's Date: 3/27/2025  Time Calculation  Start Time: 0725  Stop Time: 0814  Time Calculation (min): 49 min    Insurance:  Visit number: 3 of 15  Authorization info: Auth needed after evaluation  Insurance Type:  Traditional  Cert date start: 3/13/25        Cert date end: 6/11/25                General  Reason for Referral: L thigh pain  Referred By: TREE Badillo MD    Current Problem  1. Pathological fracture of femur due to secondary osteoporosis (Multi)  Follow Up In Physical Therapy               Pain Assessment: 0-10  0-10 (Numeric) Pain Score: 1 (\" A little\")    Subjective:   Patient reports \"a little\" pain on arrival having walked almost two miles this AM.   Patient had soreness for about a day after last session.   Patient did not recall how long relief lasted after last session.  Patient states lift seemed to help.    HEP Performed:  Yes    Objective:   Anatomic LLD L>R 1/2\"  Pirif WNL  Hams 40°  Quads R 6\" L 8\"   Hip ext R~L WFL  MMT hip ext 3, hip abd 3 hip add 3     Girth   Suprapatellar L<R 0.5 cm   Mid thigh L<R 1.0 cm      Treatments:   Stepper L2 5 min   DBE 2x2 min   4 kg Kb tandem & swing cw/ccw 10x  4 kg KB SL RDL to 6\" step R/L 10x  1/2\" riser wall sits 10x10s R/L   Hip ext R/L 55#/55# 20x  Hip abd/add 17.5#/17.5# 20x  Leg Press 40# 2x20  Hams 20# 20x  SS R/L Hams, Quads 1'   Swisswing Quads 2'     Charges: TE x2, NME     Assessment: Patient tolerated intensity with noting no increase in symptoms with exercises.  Patient noted leg press easier today than previous session.  Patient able to increase intensity on hip extension without problems.        Plan: Continue to improve ROM, strength, flexibility and balance to improve gait and ADL      Rayray Best, PTA  "

## 2025-04-03 ENCOUNTER — TREATMENT (OUTPATIENT)
Dept: PHYSICAL THERAPY | Facility: CLINIC | Age: 40
End: 2025-04-03
Payer: COMMERCIAL

## 2025-04-03 DIAGNOSIS — M80.859A PATHOLOGICAL FRACTURE OF FEMUR DUE TO SECONDARY OSTEOPOROSIS (MULTI): ICD-10-CM

## 2025-04-03 ASSESSMENT — PAIN - FUNCTIONAL ASSESSMENT: PAIN_FUNCTIONAL_ASSESSMENT: 0-10

## 2025-04-03 ASSESSMENT — PAIN SCALES - GENERAL: PAINLEVEL_OUTOF10: 1

## 2025-04-03 NOTE — PROGRESS NOTES
"Physical Therapy  Physical Therapy Treatment    Patient Name: Maribel Amador  MRN: [id number]  Today's Date: 4/3/2025       Insurance:  Visit number: 4 of 15  Authorization info: Auth needed after evaluation  Insurance Type:  Traditional  Cert date start: 3/13/25        Cert date end: 6/11/25                General  Reason for Referral: L thigh pain  Referred By: TREE Badillo MD    Current Problem  1. Pathological fracture of femur due to secondary osteoporosis (Multi)  Follow Up In Physical Therapy          Precautions  STEADI Fall Risk Score (The score of 4 or more indicates an increased risk of falling): 1  Precautions Comment: none    Pain Assessment: 0-10  0-10 (Numeric) Pain Score: 1    Subjective:   Patient arrived full weight bearing noting mild soreness this AM.  Patient reports no soreness after last session.   HEP Performed:  Yes    Objective:   Anatomic LLD L>R 1/2\"  Pirif WNL  Hams 40°  Quads R 6\" L 8\"   Hip ext R~L WFL  MMT hip ext 3, hip abd 3 hip add 3     Girth   Suprapatellar L~R    Mid thigh L<R 0.7 cm      Treatments:   Stepper L2 5 min   DBE 2x2 min   4 kg Kb tandem & swing cw/ccw 10x  4 kg KB SL RDL to 6\" step R/L 10x  1/2\" riser wall sits 10x10s R/L   Hip ext R/L 55#/55# 20x  Hip abd/add 17.5#/17.5# 20x  Leg Press 45# 2x20  Hams 20# 20x  SS R/L Hams, HF, Quads 1'   Swisswing Quads 2'     Charges: TE x2, NME     Assessment: Patient tolerated intensity increasing leg press and hip ext without problems.        Plan: Continue to improve ROM, strength, flexibility and balance to improve gait and ADL      Rayray Best, PTA  "

## 2025-04-08 ENCOUNTER — TREATMENT (OUTPATIENT)
Dept: PHYSICAL THERAPY | Facility: CLINIC | Age: 40
End: 2025-04-08
Payer: COMMERCIAL

## 2025-04-08 DIAGNOSIS — M80.859A PATHOLOGICAL FRACTURE OF FEMUR DUE TO SECONDARY OSTEOPOROSIS (MULTI): ICD-10-CM

## 2025-04-08 PROCEDURE — 97110 THERAPEUTIC EXERCISES: CPT | Mod: GP,CQ | Performed by: SPECIALIST/TECHNOLOGIST

## 2025-04-08 PROCEDURE — 97112 NEUROMUSCULAR REEDUCATION: CPT | Mod: GP,CQ | Performed by: SPECIALIST/TECHNOLOGIST

## 2025-04-08 ASSESSMENT — PAIN SCALES - GENERAL: PAINLEVEL_OUTOF10: 1

## 2025-04-08 ASSESSMENT — PAIN - FUNCTIONAL ASSESSMENT: PAIN_FUNCTIONAL_ASSESSMENT: 0-10

## 2025-04-08 NOTE — PROGRESS NOTES
"Physical Therapy  Physical Therapy Treatment    Patient Name: Maribel Amador  MRN: [id number]  Today's Date: 4/8/2025  Time Calculation  Start Time: 0700  Stop Time: 0745  Time Calculation (min): 45 min    Insurance:  Visit number: 5 of 15  Authorization info: Auth needed after evaluation  Insurance Type:  Traditional  Cert date start: 3/13/25        Cert date end: 6/11/25                General  Reason for Referral: L thigh pain  Referred By: TREE Badillo MD    Current Problem  1. Pathological fracture of femur due to secondary osteoporosis (Multi)  Follow Up In Physical Therapy          Precautions  STEADI Fall Risk Score (The score of 4 or more indicates an increased risk of falling): 1  Precautions Comment: none    Pain Assessment: 0-10  0-10 (Numeric) Pain Score: 1    Subjective:   Patient arrived full weight bearing noting generally feeling better.  Patient notes no limitation with ADL although still gets occasional soreness with activity. Patient reports no soreness after last session.   HEP Performed:  Yes    Objective:   Anatomic LLD L>R 1/2\"  Pirif WNL  Hams 40°  Quads R 6\" L 8\"   Hip ext R~L WFL  MMT hip ext 3, hip abd 3 hip add 3     Girth   Suprapatellar L~R    Mid thigh L<R 0.5 cm      Treatments:   Stepper L2 5 min   DBE 2x2 min   4 kg Kb SLS & swing cw/ccw 10x  4 kg KB SL RDL to 6\" step R/L 10x  1/2\" riser wall sits 10x10s R/L   Hip ext R/L 55#/55# 20x  Hip abd/add 17.5#/17.5# 20x  Leg Press 45# 2x20  Hams 20# 20x  SS R/L Hams, HF, Quads 1'   Swisswing Quads 2'     Charges: TE x2, NME     Assessment: Patient tolerated intensity increasing leg press and hamstrings without problems.  Patient noted mild knee soreness with hamstring curls.  Patient notes no thigh soreness.       Plan: Continue to improve ROM, strength, flexibility and balance to improve gait and ADL  Patient has one further strengthening session prior to re-check with Pascale Ramos, PT, OCS    Rayray Best PTA  "

## 2025-04-11 ENCOUNTER — APPOINTMENT (OUTPATIENT)
Dept: INTEGRATIVE MEDICINE | Facility: CLINIC | Age: 40
End: 2025-04-11
Payer: COMMERCIAL

## 2025-04-11 DIAGNOSIS — F41.9 ANXIETY: Primary | ICD-10-CM

## 2025-04-11 PROCEDURE — 97810 ACUP 1/> WO ESTIM 1ST 15 MIN: CPT | Performed by: NATUROPATH

## 2025-04-11 PROCEDURE — 97811 ACUP 1/> W/O ESTIM EA ADD 15: CPT | Performed by: NATUROPATH

## 2025-04-11 ASSESSMENT — ENCOUNTER SYMPTOMS
FEVER: 0
PALPITATIONS: 0
HEADACHES: 1
DIFFICULTY URINATING: 0
SHORTNESS OF BREATH: 0
RHINORRHEA: 0
CONSTIPATION: 0
COUGH: 0
WHEEZING: 0
ARTHRALGIAS: 1
NAUSEA: 0
TROUBLE SWALLOWING: 0
VOMITING: 0
CHILLS: 0

## 2025-04-11 NOTE — PROGRESS NOTES
Acupuncture Visit:     Subjective   Patient ID: Maribel Amador is a 39 y.o. female who presents for No chief complaint on file.  Knee pain is improving with PT  Wall squats hurt  Sleep- OK overall, a little hard staying asleep recently.   Anxiety- OK, worse due to poor sleep.   Energy lower due to poor sleep      Some knee pain recently, more than in past  Has seen ortho  Sleep- doing well.   Anxiety- sill mostly related to cycle, but good.   BM good  Energy good.         Poor sleep last night,  kept waking up  Rest of the week was OK  Anxiety- OK, feels related to cycle mostly now  BM- good.       Sleep still off, work shift has been irregular  Still pain in knuckles  Anxiety is generally low  BM good.     previous  Sleeping well  Has been back on bike-  sore but feeling well.  Pain- still sore in knuckles.   BM- low  Anxiety-  good.              Sleep still improved   Anxiety low  Pain generally low, weather makes stiff, but better after she gets moving  BM normal          Sleep getting back to normal  Anxiety - better,   No pain  BM normal        Sleep- trouble staying asleep. Bad post surgery  No pain now from procedure  Anxiety increased [place] to poor sleep.         Sleep back to normal, staying asleep   No pain today.   Anxiety- much better,  no little attacks.   BM normal        Sleep off the past few nights. Hard time staying asleep  Bone pain gone  Anxiety- OK  BM- normal.       Previous  Bone pain has cleared  Anxiety- low   Sleep good, off night last night  BM-    Previous  Bone pain is improving from medication, but still present  Worse in AM  Arms and shoulders  Anxiety- improved   Sleep- improved, better since less pain.       Previous  Had same SE with boniva, pain in neck mid back  Elbows, shoulder  Pain reduced since stopping  Anxiety still improved  Sleep- hard due to SE, pain when laying        Sleep has been more consistety  Anxiety ahs been stable.   Started on Boniva today, had bad SE with  previous meds  Energy good        Previous  Feeling well  4 days of sleep was off, more anxiety since  Has reduced back to normal recently.   No issues with lavela.     Energy good  BM normal            Feels more relaxed overall  Had one episode of more anxiety, was able to clear quickly  Started on Lavela, no issues  Sleep- A  lot better, still waking occ in middle of night, able to get back to sleep;     Energy- good.     BM good.     Started fosamax, had significant bone josefina muscle pain, had to stop. Has FU with PCP for dosing changes            Initial:  Anxiety-  random can wake middle of night  May happen during the day  Can go away quickly or hang for a week  Can feel heart racing, feels sweaty, no nausea, some HA  No diarrhea.   Deep breathing, biking, walking, reading,   Cooking  Tried ashwagandha with no benefit  Was Rx hydroxyzine, but takes rarely PRN      Sleep- tried to relax 8-10p, not always falling asleep easily. Wakes around 4-430, sometimes falls back asleep.   No meds for sleep    Energy- no issues    Hx of stroke at 14,   Broke both hips from running, dx with osteoporosis. , some pain from surgeries from screws. Occ numbness in right leg to foot post surgery, comes and goes.     Diet:   B: oatmeal, eggs, pb toast, banana  eggs avocado, milk,   L: salad, chickpeas, avocoado,   D; salmon, chicken  Sn: smoothie, yogurt, berries  Dr: water, coffee, latte, tea    BM: daily, well formed.                 Session Information  Is this acupuncture treatment being billed to the patient's insurance company: Yes  This is visit number: 5  The patient has a total number of visits of: 20  Initial Acupuncture Treatment date: 01/17/25  Name of Insurance Company:   Visit Type: Follow-up visit  Medical History Reviewed: I have reviewed pertinent medical history in EHR, and no contraindications are present to provide treatment         Review of Systems   Constitutional:  Negative for chills and fever.   HENT:   Positive for tinnitus (has seen EENT, left is worse). Negative for congestion, ear pain, rhinorrhea and trouble swallowing.    Eyes:  Negative for visual disturbance.   Respiratory:  Negative for cough, shortness of breath and wheezing.    Cardiovascular:  Negative for chest pain and palpitations.   Gastrointestinal:  Negative for constipation, nausea and vomiting.   Genitourinary:  Negative for difficulty urinating and menstrual problem.   Musculoskeletal:  Positive for arthralgias (elbows and hands,).   Skin:  Negative for rash.   Neurological:  Positive for headaches (occ with cycle.).            Provider reviewed plan for the acupuncture session, precautions and contraindications. Patient/guardian/hospital staff has given consent to treat with full understanding of what to expect during the session. Before acupuncture began, provider explained to the patient to communicate at any time if the procedure was causing discomfort past their tolerance level. Patient agreed to advise acupuncturist. The acupuncturist counseled the patient on the risks of acupuncture treatment including pain, infection, bleeding, and no relief of pain. The patient was positioned comfortably. There was no evidence of infection at the site of needle insertions.    Objective   Physical Exam         Row Name 07/03/24 0819   Acupuncture Treatment   Body Points - Bilateral Jina baker may, yin tong, Li4, HT7, SP6, KI6, LR3   Auricular Points Yes   Auricular Points - Bilateral BFA 1-3   Other Techniques Utilized TDP Lamp   TDP Lamp Descripton feet   Needle Count In 20   Needle Count Out 20   Needle Retention Time (min) 25 minutes   Total Face to Face Time (min) 25 minutes                         Assessment/Plan   Diagnoses and all orders for this visit:  Anxiety (Primary)                                    Supplement recommendations:  Gina FALCON 1265 (60 Softgels) ([employer]): Please take  1 gel, twice / day  ongoing (with a full  glass of water. If you get lavender burps and they are bothersome, you can but the whole package in the freezer and take the capsules from there. )

## 2025-04-15 ENCOUNTER — TREATMENT (OUTPATIENT)
Dept: PHYSICAL THERAPY | Facility: CLINIC | Age: 40
End: 2025-04-15
Payer: COMMERCIAL

## 2025-04-15 DIAGNOSIS — M80.859A PATHOLOGICAL FRACTURE OF FEMUR DUE TO SECONDARY OSTEOPOROSIS (MULTI): ICD-10-CM

## 2025-04-15 PROCEDURE — 97112 NEUROMUSCULAR REEDUCATION: CPT | Mod: GP,CQ | Performed by: SPECIALIST/TECHNOLOGIST

## 2025-04-15 PROCEDURE — 97110 THERAPEUTIC EXERCISES: CPT | Mod: GP,CQ | Performed by: SPECIALIST/TECHNOLOGIST

## 2025-04-15 ASSESSMENT — PAIN - FUNCTIONAL ASSESSMENT: PAIN_FUNCTIONAL_ASSESSMENT: 0-10

## 2025-04-15 ASSESSMENT — PAIN SCALES - GENERAL: PAINLEVEL_OUTOF10: 1

## 2025-04-15 NOTE — PROGRESS NOTES
"Physical Therapy  Physical Therapy Treatment    Patient Name: Maribel Amador  MRN: [id number]  Today's Date: 4/15/2025  Time Calculation  Start Time: 0700  Stop Time: 0745  Time Calculation (min): 45 min    Insurance:  Visit number: 6 of 15  Authorization info: Auth needed after evaluation  Insurance Type:  Traditional  Cert date start: 3/13/25        Cert date end: 6/11/25                General  Reason for Referral: L thigh pain  Referred By: TREE Badillo MD    Current Problem  1. Pathological fracture of femur due to secondary osteoporosis (Multi)  Follow Up In Physical Therapy          Precautions  STEADI Fall Risk Score (The score of 4 or more indicates an increased risk of falling): 1  Precautions Comment: none    Pain Assessment: 0-10  0-10 (Numeric) Pain Score: 1    Subjective:   Patient arrived full weight bearing without a limp.  Patient notes low level pain on arrival 1 of 10 with mild soreness that lasted less than a day. HEP Performed:  Yes    Objective:   Anatomic LLD L>R 1/2\"  Pirif WNL  Hams 40°  Quads R 6\" L 8\"   Hip ext R~L WFL  MMT hip ext 3, hip abd 3 hip add 3     Girth   Suprapatellar L~R    Mid thigh L<R 1.0 cm     Knee AROM L 0°-120° with soreness in flexion, R 0°-137° no discomfort post tx L 0°-126°     Treatments:   Stepper L2 5 min   DBE 2x2 min   4 kg Kb SLS & swing cw/ccw 10x  4 kg KB SL RDL to 6\" step R/L 10x  1/2\" riser wall sits 10x10s R/L   Hip ext R/L 55#/55# 20x  Hip abd/add 17.5#/17.5# 20x  Leg Press 50# 2x20  Hams 20# 20x  SS R/L Hams, HF, Quads 1'   25# band heel slides 2'     Charges: TE x2, NME     Assessment: Patient tolerated intensity increasing leg press and hamstrings without problems.  Patient improved AROM after heel slides with mild soreness.         Plan: Continue to improve ROM, strength, flexibility and balance to improve gait and ADL  Patient has re-check with Pascale Ramos, PT, [facility] on April 29th.     Rayray Best PTA  "

## 2025-04-16 ENCOUNTER — APPOINTMENT (OUTPATIENT)
Dept: ORTHOPEDIC SURGERY | Facility: CLINIC | Age: 40
End: 2025-04-16
Payer: COMMERCIAL

## 2025-04-22 ENCOUNTER — APPOINTMENT (OUTPATIENT)
Dept: INTEGRATIVE MEDICINE | Facility: CLINIC | Age: 40
End: 2025-04-22
Payer: COMMERCIAL

## 2025-04-22 VITALS
DIASTOLIC BLOOD PRESSURE: 72 MMHG | OXYGEN SATURATION: 99 % | SYSTOLIC BLOOD PRESSURE: 114 MMHG | WEIGHT: 116 LBS | HEIGHT: 62 IN | HEART RATE: 84 BPM | BODY MASS INDEX: 21.35 KG/M2

## 2025-04-22 DIAGNOSIS — E55.9 VITAMIN D DEFICIENCY: ICD-10-CM

## 2025-04-22 DIAGNOSIS — F41.9 ANXIETY: ICD-10-CM

## 2025-04-22 DIAGNOSIS — M81.8 IDIOPATHIC OSTEOPOROSIS: Primary | ICD-10-CM

## 2025-04-22 DIAGNOSIS — E53.8 VITAMIN B12 DEFICIENCY: ICD-10-CM

## 2025-04-22 PROCEDURE — 99214 OFFICE O/P EST MOD 30 MIN: CPT | Performed by: INTERNAL MEDICINE

## 2025-04-22 RX ORDER — IBUPROFEN 200 MG
1 CAPSULE ORAL DAILY
COMMUNITY

## 2025-04-22 ASSESSMENT — ENCOUNTER SYMPTOMS
FEVER: 0
SHORTNESS OF BREATH: 0
APPETITE CHANGE: 0
NERVOUS/ANXIOUS: 1
COUGH: 0
ARTHRALGIAS: 0
MYALGIAS: 0
NUMBNESS: 0
SLEEP DISTURBANCE: 0
HEADACHES: 0
FREQUENCY: 0
BACK PAIN: 0
UNEXPECTED WEIGHT CHANGE: 0
ABDOMINAL DISTENTION: 0
DIARRHEA: 0
WEAKNESS: 0
DIFFICULTY URINATING: 0
DYSURIA: 0
ABDOMINAL PAIN: 0
DIZZINESS: 1
CONSTIPATION: 0
FATIGUE: 0

## 2025-04-22 NOTE — ASSESSMENT & PLAN NOTE
Recheck level because dose change 9 months ago and want to make sure it is still above 40 ng/ml  Had pth level years ago. Given how young she is want to repeat this once more to make sure it is not hyperparathyroidism causing her osteoporosis.

## 2025-04-22 NOTE — PATIENT INSTRUCTIONS
Try walking around with a weight in a back pack.   Work on 2 sessions per week of strength for 20-30 minutes especially after completing physical therapy.  Consider soy milk again.   Continue to eat dark green leafy greens daily.   5.  I will let you know about the labs.   Follow up 4 months.   Silvana Andrade MD PhD

## 2025-04-22 NOTE — ASSESSMENT & PLAN NOTE
Doing well. Keep workingon lifestyle.   Continue acupuncture  Increase physical activity  Increase folate in the diet.   Continue to work on sleep.

## 2025-04-22 NOTE — PROGRESS NOTES
Integrative Medicine Follow-up Visit :     Subjective   Patient ID: Maribel Amador is a 39 y.o. female who presents for Follow-up       HPI  Doing ok with PT. Stll having pain in her knee especially.   Still taking calcium.   She is hardly ever listing on her own. She walks 5 to 7 days per week and incorporates hills and stairs. She got ankle weights.   She is doing some resistance band work outs on her own for 15 minutes. She holds weight at the PT sessions. Doing leg press, leg curls.   Takes calcium 600 mg by mouth daily. There was a dose change in her vitamind to 2000 international units by mouth daily.   Still doing oatmeal. Getting kale.   Rare beans and lentils. Getting them a couple times per week.      Lab Results   Component Value Date    PTH 53.0 06/23/2022    CALCIUM 9.4 07/18/2024    She does her walks in the morning which helps her mood. Goes around 6:15 am.   Getting 7-8 hours per night of sleep.   Oatmeal with prunes, pumpkin seeds walnuts. Drinking 2% milk in the morning. Does not like oat, soy or almond milk.   No bowel or bladder issues. No constipation. No diarrhea.  Eating blueberries in her yogurt with dark chocolate.   She switched her heavier meal for her lunch. Getting 1-2 servings of vegetaables at lunch.  Likes hummus with chocolate for dessert. Has soup for dinner. Eating pears at dinner.     Shoulder pain resolved.     Anxiety well managed with walking and seeing acupuncture 1-2 times per month.   Review of Systems   Constitutional:  Negative for appetite change, fatigue, fever and unexpected weight change.   HENT:  Negative for congestion and hearing loss.    Eyes:  Negative for visual disturbance.   Respiratory:  Negative for cough and shortness of breath.    Cardiovascular:  Negative for chest pain and leg swelling.   Gastrointestinal:  Negative for abdominal distention, abdominal pain, constipation and diarrhea.   Genitourinary:  Negative for difficulty urinating, dysuria,  "frequency, menstrual problem and urgency.        Had difficulty getting pregnant. Almost gave up. Took several years to get pregnant. No miscarriages   Musculoskeletal:  Negative for arthralgias, back pain and myalgias.   Skin:  Negative for rash.   Neurological:  Positive for dizziness (not as bad). Negative for weakness, numbness (right numbness down right leg sometimes) and headaches.        Short term memory issues- has trouble with words sometimes.  Can't remember where she put something sometimes.    Psychiatric/Behavioral:  Negative for behavioral problems, sleep disturbance and suicidal ideas. The patient is nervous/anxious.                   Objective   /72 (BP Location: Left arm, Patient Position: Sitting, BP Cuff Size: Adult)   Pulse 84   Ht 1.575 m (5' 2\")   Wt 52.6 kg (116 lb)   SpO2 99%   BMI 21.22 kg/m²     Physical Exam  HENT:      Head: Normocephalic and atraumatic.      Mouth/Throat:      Mouth: Mucous membranes are moist.   Cardiovascular:      Rate and Rhythm: Normal rate.   Pulmonary:      Effort: Pulmonary effort is normal. No respiratory distress.   Musculoskeletal:         General: Tenderness present. No swelling or deformity.      Cervical back: Normal range of motion.   Skin:     General: Skin is dry.      Findings: No rash.   Neurological:      General: No focal deficit present.      Mental Status: She is alert and oriented to person, place, and time.   Psychiatric:      Comments: Normal affect                      Assessment/Plan     Problem List Items Addressed This Visit           ICD-10-CM    Idiopathic osteoporosis - Primary M81.8    Relevant Orders    Vitamin D 25-Hydroxy,Total (for eval of Vitamin D levels)    PTH, intact    Calcium    Anxiety F41.9    Doing well. Keep workingon lifestyle.   Continue acupuncture  Increase physical activity  Increase folate in the diet.   Continue to work on sleep.          Vitamin B12 deficiency E53.8    Vitamin D deficiency E55.9    " Recheck level because dose change 9 months ago and want to make sure it is still above 40 ng/ml  Had pth level years ago. Given how young she is want to repeat this once more to make sure it is not hyperparathyroidism causing her osteoporosis.               Recommend Follow up in : 4 months.     Silvana Andrade MD PhD    Time Spent  Prep time on day of patient encounter: 5 minutes  Time spent directly with patient, family or caregiver: 30 minutes  Additional Time Spent on Patient Care Activities: 0 minutes  Documentation Time: 5 minutes  Other Time Spent: 0 minutes  Total: 40 minutes

## 2025-04-23 ENCOUNTER — APPOINTMENT (OUTPATIENT)
Dept: INTEGRATIVE MEDICINE | Facility: CLINIC | Age: 40
End: 2025-04-23
Payer: COMMERCIAL

## 2025-04-23 ASSESSMENT — ENCOUNTER SYMPTOMS
PALPITATIONS: 0
HEADACHES: 1
CHILLS: 0
VOMITING: 0
ARTHRALGIAS: 1
NAUSEA: 0
FEVER: 0
TROUBLE SWALLOWING: 0
DIFFICULTY URINATING: 0
WHEEZING: 0
RHINORRHEA: 0
COUGH: 0
SHORTNESS OF BREATH: 0
CONSTIPATION: 0

## 2025-04-23 NOTE — PROGRESS NOTES
Acupuncture Visit:     Subjective   Patient ID: Maribel Amador is a 39 y.o. female who presents for No chief complaint on file.  Knee pain still slight, medial and distal to patella  Sleep- Sleep   Energy is good  Anxiety has been stable, low  BM normal.           Knee pain is improving with PT  Wall squats hurt  Sleep- OK overall, a little hard staying asleep recently.   Anxiety- OK, worse due to poor sleep.   Energy lower due to poor sleep      Some knee pain recently, more than in past  Has seen ortho  Sleep- doing well.   Anxiety- sill mostly related to cycle, but good.   BM good  Energy good.         Poor sleep last night,  kept waking up  Rest of the week was OK  Anxiety- OK, feels related to cycle mostly now  BM- good.       Sleep still off, work shift has been irregular  Still pain in knuckles  Anxiety is generally low  BM good.     previous  Sleeping well  Has been back on bike-  sore but feeling well.  Pain- still sore in knuckles.   BM- low  Anxiety-  good.              Sleep still improved   Anxiety low  Pain generally low, weather makes stiff, but better after she gets moving  BM normal          Sleep getting back to normal  Anxiety - better,   No pain  BM normal        Sleep- trouble staying asleep. Bad post surgery  No pain now from procedure  Anxiety increased [place] to poor sleep.         Sleep back to normal, staying asleep   No pain today.   Anxiety- much better,  no little attacks.   BM normal        Sleep off the past few nights. Hard time staying asleep  Bone pain gone  Anxiety- OK  BM- normal.       Previous  Bone pain has cleared  Anxiety- low   Sleep good, off night last night  BM-    Previous  Bone pain is improving from medication, but still present  Worse in AM  Arms and shoulders  Anxiety- improved   Sleep- improved, better since less pain.       Previous  Had same SE with boniva, pain in neck mid back  Elbows, shoulder  Pain reduced since stopping  Anxiety still improved  Sleep- hard  due to SE, pain when laying        Sleep has been more consistety  Anxiety ahs been stable.   Started on Boniva today, had bad SE with previous meds  Energy good        Previous  Feeling well  4 days of sleep was off, more anxiety since  Has reduced back to normal recently.   No issues with lavela.     Energy good  BM normal            Feels more relaxed overall  Had one episode of more anxiety, was able to clear quickly  Started on Lavela, no issues  Sleep- A  lot better, still waking occ in middle of night, able to get back to sleep;     Energy- good.     BM good.     Started fosamax, had significant bone josefina muscle pain, had to stop. Has FU with PCP for dosing changes            Initial:  Anxiety-  random can wake middle of night  May happen during the day  Can go away quickly or hang for a week  Can feel heart racing, feels sweaty, no nausea, some HA  No diarrhea.   Deep breathing, biking, walking, reading,   Cooking  Tried ashwagandha with no benefit  Was Rx hydroxyzine, but takes rarely PRN      Sleep- tried to relax 8-10p, not always falling asleep easily. Wakes around 4-430, sometimes falls back asleep.   No meds for sleep    Energy- no issues    Hx of stroke at 14,   Broke both hips from running, dx with osteoporosis. , some pain from surgeries from screws. Occ numbness in right leg to foot post surgery, comes and goes.     Diet:   B: oatmeal, eggs, pb toast, banana  eggs avocado, milk,   L: salad, chickpeas, avocoado,   D; salmon, chicken  Sn: smoothie, yogurt, berries  Dr: water, coffee, latte, tea    BM: daily, well formed.                 Session Information  Is this acupuncture treatment being billed to the patient's insurance company: Yes  This is visit number: 6  The patient has a total number of visits of: 20  Initial Acupuncture Treatment date: 01/17/25  Name of Insurance Company:   Visit Type: Follow-up visit  Medical History Reviewed: I have reviewed pertinent medical history in EHR, and no  contraindications are present to provide treatment         Review of Systems   Constitutional:  Negative for chills and fever.   HENT:  Positive for tinnitus (has seen EENT, left is worse). Negative for congestion, ear pain, rhinorrhea and trouble swallowing.    Eyes:  Negative for visual disturbance.   Respiratory:  Negative for cough, shortness of breath and wheezing.    Cardiovascular:  Negative for chest pain and palpitations.   Gastrointestinal:  Negative for constipation, nausea and vomiting.   Genitourinary:  Negative for difficulty urinating and menstrual problem.   Musculoskeletal:  Positive for arthralgias (elbows and hands,).   Skin:  Negative for rash.   Neurological:  Positive for headaches (occ with cycle.).            Provider reviewed plan for the acupuncture session, precautions and contraindications. Patient/guardian/hospital staff has given consent to treat with full understanding of what to expect during the session. Before acupuncture began, provider explained to the patient to communicate at any time if the procedure was causing discomfort past their tolerance level. Patient agreed to advise acupuncturist. The acupuncturist counseled the patient on the risks of acupuncture treatment including pain, infection, bleeding, and no relief of pain. The patient was positioned comfortably. There was no evidence of infection at the site of needle insertions.    Objective   Physical Exam         Row Name 07/03/24 0819   Acupuncture Treatment   Body Points - Bilateral Jina baker may, yin tong, Li4, HT7, SP6, KI6, LR3   Auricular Points Yes   Auricular Points - Bilateral BFA 1-3   Other Techniques Utilized TDP Lamp   TDP Lamp Descripton feet   Needle Count In 20   Needle Count Out 20   Needle Retention Time (min) 25 minutes   Total Face to Face Time (min) 25 minutes                         Assessment/Plan   There are no diagnoses linked to this encounter.                                    Supplement  recommendations:  Gina WS 1265 (60 Softgels) ([employer]): Please take  1 gel, twice / day  ongoing (with a full glass of water. If you get lavender burps and they are bothersome, you can but the whole package in the freezer and take the capsules from there. )

## 2025-04-24 NOTE — PROGRESS NOTES
"Physical Therapy  Physical Therapy Orthopedic Progress Note    Patient Name: Maribel Amador  MRN: [id number]  Today's Date: 4/29/2025  Time Calculation  Start Time: 0700  Stop Time: 0745  Time Calculation (min): 45 min    Visit number: 7 of 15  Authorization info: Auth needed after evaluation  Insurance Type:  Traditional  Cert date start: 3/13/25        Cert date end: 6/11/25                 General  Reason for Referral: L thigh pain  Referred By: TREE Badillo MD      Current Problem  1. Pathological fracture of femur due to secondary osteoporosis (Multi)  Follow Up In Physical Therapy             Precautions:  Precautions  STEADI Fall Risk Score (The score of 4 or more indicates an increased risk of falling): 1        Subjective   Patient reports a little stiffness in L hip, but otherwise feeling much better. Patient has been walking outside and goes to gym to use equipment.    Current Condition:   better     PAIN  0-10 (Numeric) Pain Score: 0 - No pain      Self Reported Function (0-100%) = 100%      Objective   Posture: increased lumbar lordosis, pronation B  Palpation: no pain to palpation B thigh/knee  Balance: SLS x5 sec R/L  Squat: + weakness, no pain, slight guarding  Trendelenburg: + B  LE Strength: 4 B  Core Strength: 4+  LE Flexibility: tight hamstring, quad B        Outcome Measures: Updated 4/29/2025  Other Measures  Lower Extremity Funtional Score (LEFS): 79/80           Treatments:    Bike x5 min L5  Re-assess goals  Review HEP  6\" box: fwd step up, step up/over, stand tall, lower R/L ea x1'  4 kg Kb SLS & swing cw/ccw 15x  Bosu squats 2x10  Bosu plank 2x20 sec  Bosu boat pose 2x20 sec  Bosu push up 2x5    Access Code: [email or web address] (SLR x4, bridge, standing HS stretch, standing Quad stretch)     Charges: TE x2, NME     Assessment:   Patient demonstrates improvements with strength, flexibility and endurance. At this time, I feel patient is ready to continue independently.     Goals: Updated " 4/29/2025  Resolved       PT Problem       STG (Adequate for Discharge)       Start:  03/13/25    Expected End:  06/11/25    Resolved:  04/29/25    STG's to be achieved in 6 weeks    1. Decrease L leg pain 50% with activity- goal achieved  2. Increase  LEFS by 1 point to help improve ADL's - in progress  3. Increase core/LE strength 1/2 muscle grade to help improve endurance - goal achieved  4. Increase flexibility hamstring/quad by 50% for improved daily function - goal achieved  5. Patient to consider orthotics for walking shoes - goal achieved  6. Demonstrate proper posture with exercise - goal achieved             LTG (Adequate for Discharge)       Start:  03/13/25    Expected End:  06/11/25    Resolved:  04/29/25    LTG's to be achieved in 12 weeks    1. Decrease L leg pain to tolerable with activity - goal achieved  2. Patient to have no incidents of L knee give way- goal achieved  3. Increase core/LE strength 1 muscle grade to help improve endurance - in progress  4. Increase flexibility hamstring/quad to WNL for improved daily function - in progress  5. Patient able to resume past level of activity without increase in symptoms - goal achieved  6. Patient to be independent in daily HEP - goal achieved               Plan of Care: Updated 4/29/2025   Discharge PT. Encourage continuation of HEP for best results.       Pascale Ramos, PT, OCS

## 2025-04-29 ENCOUNTER — TREATMENT (OUTPATIENT)
Dept: PHYSICAL THERAPY | Facility: CLINIC | Age: 40
End: 2025-04-29
Payer: COMMERCIAL

## 2025-04-29 DIAGNOSIS — M80.859A PATHOLOGICAL FRACTURE OF FEMUR DUE TO SECONDARY OSTEOPOROSIS (MULTI): ICD-10-CM

## 2025-04-29 PROCEDURE — 97112 NEUROMUSCULAR REEDUCATION: CPT | Mod: GP

## 2025-04-29 PROCEDURE — 97110 THERAPEUTIC EXERCISES: CPT | Mod: GP

## 2025-04-29 ASSESSMENT — PAIN SCALES - GENERAL: PAINLEVEL_OUTOF10: 0 - NO PAIN

## 2025-05-09 ENCOUNTER — APPOINTMENT (OUTPATIENT)
Dept: INTEGRATIVE MEDICINE | Facility: CLINIC | Age: 40
End: 2025-05-09
Payer: COMMERCIAL

## 2025-05-09 DIAGNOSIS — F41.9 ANXIETY: Primary | ICD-10-CM

## 2025-05-09 PROCEDURE — 97811 ACUP 1/> W/O ESTIM EA ADD 15: CPT | Performed by: NATUROPATH

## 2025-05-09 PROCEDURE — 97810 ACUP 1/> WO ESTIM 1ST 15 MIN: CPT | Performed by: NATUROPATH

## 2025-05-09 ASSESSMENT — ENCOUNTER SYMPTOMS
TROUBLE SWALLOWING: 0
WHEEZING: 0
VOMITING: 0
PALPITATIONS: 0
ARTHRALGIAS: 1
NAUSEA: 0
SHORTNESS OF BREATH: 0
HEADACHES: 1
RHINORRHEA: 0
FEVER: 0
COUGH: 0
CONSTIPATION: 0
CHILLS: 0
DIFFICULTY URINATING: 0

## 2025-05-09 NOTE — PROGRESS NOTES
Acupuncture Visit:     Subjective   Patient ID: Maribel Amador is a 39 y.o. female who presents for No chief complaint on file.  Knee feeling well, no pain  Sleep- good, no issues  Anxiety- better  BM- normal.         Knee pain still slight, medial and distal to patella  Sleep- Sleep   Energy is good  Anxiety has been stable, low  BM normal.           Knee pain is improving with PT  Wall squats hurt  Sleep- OK overall, a little hard staying asleep recently.   Anxiety- OK, worse due to poor sleep.   Energy lower due to poor sleep      Some knee pain recently, more than in past  Has seen ortho  Sleep- doing well.   Anxiety- sill mostly related to cycle, but good.   BM good  Energy good.         Poor sleep last night,  kept waking up  Rest of the week was OK  Anxiety- OK, feels related to cycle mostly now  BM- good.       Sleep still off, work shift has been irregular  Still pain in knuckles  Anxiety is generally low  BM good.     previous  Sleeping well  Has been back on bike-  sore but feeling well.  Pain- still sore in knuckles.   BM- low  Anxiety-  good.              Sleep still improved   Anxiety low  Pain generally low, weather makes stiff, but better after she gets moving  BM normal          Sleep getting back to normal  Anxiety - better,   No pain  BM normal        Sleep- trouble staying asleep. Bad post surgery  No pain now from procedure  Anxiety increased [place] to poor sleep.         Sleep back to normal, staying asleep   No pain today.   Anxiety- much better,  no little attacks.   BM normal        Sleep off the past few nights. Hard time staying asleep  Bone pain gone  Anxiety- OK  BM- normal.       Previous  Bone pain has cleared  Anxiety- low   Sleep good, off night last night  BM-    Previous  Bone pain is improving from medication, but still present  Worse in AM  Arms and shoulders  Anxiety- improved   Sleep- improved, better since less pain.       Previous  Had same SE with boniva, pain in neck  mid back  Elbows, shoulder  Pain reduced since stopping  Anxiety still improved  Sleep- hard due to SE, pain when laying        Sleep has been more consistety  Anxiety ahs been stable.   Started on Boniva today, had bad SE with previous meds  Energy good        Previous  Feeling well  4 days of sleep was off, more anxiety since  Has reduced back to normal recently.   No issues with lavela.     Energy good  BM normal            Feels more relaxed overall  Had one episode of more anxiety, was able to clear quickly  Started on Lavela, no issues  Sleep- A  lot better, still waking occ in middle of night, able to get back to sleep;     Energy- good.     BM good.     Started fosamax, had significant bone josefina muscle pain, had to stop. Has FU with PCP for dosing changes            Initial:  Anxiety-  random can wake middle of night  May happen during the day  Can go away quickly or hang for a week  Can feel heart racing, feels sweaty, no nausea, some HA  No diarrhea.   Deep breathing, biking, walking, reading,   Cooking  Tried ashwagandha with no benefit  Was Rx hydroxyzine, but takes rarely PRN      Sleep- tried to relax 8-10p, not always falling asleep easily. Wakes around 4-430, sometimes falls back asleep.   No meds for sleep    Energy- no issues    Hx of stroke at 14,   Broke both hips from running, dx with osteoporosis. , some pain from surgeries from screws. Occ numbness in right leg to foot post surgery, comes and goes.     Diet:   B: oatmeal, eggs, pb toast, banana  eggs avocado, milk,   L: salad, chickpeas, avocoado,   D; salmon, chicken  Sn: smoothie, yogurt, berries  Dr: water, coffee, latte, tea    BM: daily, well formed.                           Review of Systems   Constitutional:  Negative for chills and fever.   HENT:  Positive for tinnitus (has seen EENT, left is worse). Negative for congestion, ear pain, rhinorrhea and trouble swallowing.    Eyes:  Negative for visual disturbance.   Respiratory:   Negative for cough, shortness of breath and wheezing.    Cardiovascular:  Negative for chest pain and palpitations.   Gastrointestinal:  Negative for constipation, nausea and vomiting.   Genitourinary:  Negative for difficulty urinating and menstrual problem.   Musculoskeletal:  Positive for arthralgias (elbows and hands,).   Skin:  Negative for rash.   Neurological:  Positive for headaches (occ with cycle.).            Provider reviewed plan for the acupuncture session, precautions and contraindications. Patient/guardian/hospital staff has given consent to treat with full understanding of what to expect during the session. Before acupuncture began, provider explained to the patient to communicate at any time if the procedure was causing discomfort past their tolerance level. Patient agreed to advise acupuncturist. The acupuncturist counseled the patient on the risks of acupuncture treatment including pain, infection, bleeding, and no relief of pain. The patient was positioned comfortably. There was no evidence of infection at the site of needle insertions.    Objective   Physical Exam         Row Name 07/03/24 0819   Acupuncture Treatment   Body Points - Bilateral Jina baker may, yin tong, Li4, HT7, SP6, KI6, LR3   Auricular Points Yes   Auricular Points - Bilateral BFA 1-3   Other Techniques Utilized TDP Lamp   TDP Lamp Descripton feet   Needle Count In 20   Needle Count Out 20   Needle Retention Time (min) 25 minutes   Total Face to Face Time (min) 25 minutes                         Assessment/Plan   Diagnoses and all orders for this visit:  Anxiety (Primary)                                      Supplement recommendations:  Gina WS 1265 (60 Softgels) ([employer]): Please take  1 gel, twice / day  ongoing (with a full glass of water. If you get lavender burps and they are bothersome, you can but the whole package in the freezer and take the capsules from there. )

## 2025-05-27 ENCOUNTER — APPOINTMENT (OUTPATIENT)
Dept: INTEGRATIVE MEDICINE | Facility: CLINIC | Age: 40
End: 2025-05-27
Payer: COMMERCIAL

## 2025-05-27 DIAGNOSIS — F41.9 ANXIETY: Primary | ICD-10-CM

## 2025-05-27 PROCEDURE — 97811 ACUP 1/> W/O ESTIM EA ADD 15: CPT | Performed by: NATUROPATH

## 2025-05-27 PROCEDURE — 97810 ACUP 1/> WO ESTIM 1ST 15 MIN: CPT | Performed by: NATUROPATH

## 2025-05-27 ASSESSMENT — ENCOUNTER SYMPTOMS
VOMITING: 0
TROUBLE SWALLOWING: 0
COUGH: 0
CHILLS: 0
SHORTNESS OF BREATH: 0
FEVER: 0
NAUSEA: 0
HEADACHES: 1
RHINORRHEA: 0
PALPITATIONS: 0
WHEEZING: 0
ARTHRALGIAS: 1
CONSTIPATION: 0
DIFFICULTY URINATING: 0

## 2025-05-27 NOTE — PROGRESS NOTES
Acupuncture Visit:     Subjective   Patient ID: Maribel Amador is a 39 y.o. female who presents for No chief complaint on file.  Knee still doung well  Sleep- falling asleep and staying asleep  Anixety- very low.   BM normal-          Knee feeling well, no pain  Sleep- good, no issues  Anxiety- better  BM- normal.         Knee pain still slight, medial and distal to patella  Sleep- Sleep   Energy is good  Anxiety has been stable, low  BM normal.           Knee pain is improving with PT  Wall squats hurt  Sleep- OK overall, a little hard staying asleep recently.   Anxiety- OK, worse due to poor sleep.   Energy lower due to poor sleep      Some knee pain recently, more than in past  Has seen ortho  Sleep- doing well.   Anxiety- sill mostly related to cycle, but good.   BM good  Energy good.         Poor sleep last night,  kept waking up  Rest of the week was OK  Anxiety- OK, feels related to cycle mostly now  BM- good.       Sleep still off, work shift has been irregular  Still pain in knuckles  Anxiety is generally low  BM good.     previous  Sleeping well  Has been back on bike-  sore but feeling well.  Pain- still sore in knuckles.   BM- low  Anxiety-  good.              Sleep still improved   Anxiety low  Pain generally low, weather makes stiff, but better after she gets moving  BM normal          Sleep getting back to normal  Anxiety - better,   No pain  BM normal        Sleep- trouble staying asleep. Bad post surgery  No pain now from procedure  Anxiety increased [place] to poor sleep.         Sleep back to normal, staying asleep   No pain today.   Anxiety- much better,  no little attacks.   BM normal        Sleep off the past few nights. Hard time staying asleep  Bone pain gone  Anxiety- OK  BM- normal.       Previous  Bone pain has cleared  Anxiety- low   Sleep good, off night last night  BM-    Previous  Bone pain is improving from medication, but still present  Worse in AM  Arms and shoulders  Anxiety-  improved   Sleep- improved, better since less pain.       Previous  Had same SE with boniva, pain in neck mid back  Elbows, shoulder  Pain reduced since stopping  Anxiety still improved  Sleep- hard due to SE, pain when laying        Sleep has been more consistety  Anxiety ahs been stable.   Started on Boniva today, had bad SE with previous meds  Energy good        Previous  Feeling well  4 days of sleep was off, more anxiety since  Has reduced back to normal recently.   No issues with lavela.     Energy good  BM normal            Feels more relaxed overall  Had one episode of more anxiety, was able to clear quickly  Started on Lavela, no issues  Sleep- A  lot better, still waking occ in middle of night, able to get back to sleep;     Energy- good.     BM good.     Started fosamax, had significant bone josefina muscle pain, had to stop. Has FU with PCP for dosing changes            Initial:  Anxiety-  random can wake middle of night  May happen during the day  Can go away quickly or hang for a week  Can feel heart racing, feels sweaty, no nausea, some HA  No diarrhea.   Deep breathing, biking, walking, reading,   Cooking  Tried ashwagandha with no benefit  Was Rx hydroxyzine, but takes rarely PRN      Sleep- tried to relax 8-10p, not always falling asleep easily. Wakes around 4-430, sometimes falls back asleep.   No meds for sleep    Energy- no issues    Hx of stroke at 14,   Broke both hips from running, dx with osteoporosis. , some pain from surgeries from screws. Occ numbness in right leg to foot post surgery, comes and goes.     Diet:   B: oatmeal, eggs, pb toast, banana  eggs avocado, milk,   L: salad, chickpeas, avocoado,   D; salmon, chicken  Sn: smoothie, yogurt, berries  Dr: water, coffee, latte, tea    BM: daily, well formed.                 Session Information  Is this acupuncture treatment being billed to the patient's insurance company: Yes  This is visit number: 8  The patient has a total number of visits  of: 20  Initial Acupuncture Treatment date: 01/17/25  Name of Insurance Company:   Visit Type: Follow-up visit  Medical History Reviewed: I have reviewed pertinent medical history in EHR, and no contraindications are present to provide treatment         Review of Systems   Constitutional:  Negative for chills and fever.   HENT:  Positive for tinnitus (has seen EENT, left is worse). Negative for congestion, ear pain, rhinorrhea and trouble swallowing.    Eyes:  Negative for visual disturbance.   Respiratory:  Negative for cough, shortness of breath and wheezing.    Cardiovascular:  Negative for chest pain and palpitations.   Gastrointestinal:  Negative for constipation, nausea and vomiting.   Genitourinary:  Negative for difficulty urinating and menstrual problem.   Musculoskeletal:  Positive for arthralgias (elbows and hands,).   Skin:  Negative for rash.   Neurological:  Positive for headaches (occ with cycle.).            Provider reviewed plan for the acupuncture session, precautions and contraindications. Patient/guardian/hospital staff has given consent to treat with full understanding of what to expect during the session. Before acupuncture began, provider explained to the patient to communicate at any time if the procedure was causing discomfort past their tolerance level. Patient agreed to advise acupuncturist. The acupuncturist counseled the patient on the risks of acupuncture treatment including pain, infection, bleeding, and no relief of pain. The patient was positioned comfortably. There was no evidence of infection at the site of needle insertions.    Objective   Physical Exam         Row Name 07/03/24 0819   Acupuncture Treatment   Body Points - Bilateral Jina baker may, yin tong, Li4, HT7, SP6, KI6, LR3   Auricular Points Yes   Auricular Points - Bilateral BFA 1-3   Other Techniques Utilized TDP Lamp   TDP Lamp Descripton feet   Needle Count In 20   Needle Count Out 20   Needle Retention Time (min)  25 minutes   Total Face to Face Time (min) 25 minutes                         Assessment/Plan   Diagnoses and all orders for this visit:  Anxiety (Primary)                                        Supplement recommendations:  Gina WS 1265 (60 Softgels) ([employer]): Please take  1 gel, twice / day  ongoing (with a full glass of water. If you get lavender burps and they are bothersome, you can but the whole package in the freezer and take the capsules from there. )

## 2025-06-04 ENCOUNTER — APPOINTMENT (OUTPATIENT)
Dept: RHEUMATOLOGY | Facility: CLINIC | Age: 40
End: 2025-06-04
Payer: COMMERCIAL

## 2025-06-04 VITALS
DIASTOLIC BLOOD PRESSURE: 69 MMHG | WEIGHT: 114.4 LBS | TEMPERATURE: 97.3 F | HEART RATE: 68 BPM | BODY MASS INDEX: 21.05 KG/M2 | HEIGHT: 62 IN | OXYGEN SATURATION: 98 % | SYSTOLIC BLOOD PRESSURE: 104 MMHG

## 2025-06-04 DIAGNOSIS — Z51.81 ENCOUNTER FOR MONITORING TERIPARATIDE THERAPY: Primary | ICD-10-CM

## 2025-06-04 DIAGNOSIS — Z79.899 ENCOUNTER FOR MONITORING TERIPARATIDE THERAPY: Primary | ICD-10-CM

## 2025-06-04 DIAGNOSIS — M81.8 OTHER OSTEOPOROSIS WITHOUT CURRENT PATHOLOGICAL FRACTURE: ICD-10-CM

## 2025-06-04 PROCEDURE — 3008F BODY MASS INDEX DOCD: CPT | Performed by: INTERNAL MEDICINE

## 2025-06-04 PROCEDURE — 99213 OFFICE O/P EST LOW 20 MIN: CPT | Performed by: INTERNAL MEDICINE

## 2025-06-04 PROCEDURE — 1036F TOBACCO NON-USER: CPT | Performed by: INTERNAL MEDICINE

## 2025-06-04 ASSESSMENT — PAIN SCALES - GENERAL: PAINLEVEL_OUTOF10: 0-NO PAIN

## 2025-06-04 NOTE — PROGRESS NOTES
Recall  Ms. Amador is a 36-year-old  female with a diagnosis of osteoporosis, recent bilateral femoral fractures s/p fixation, CVA at the age of 14 with subsequent diagnosis of protein C deficiency, right upper extremity DVT, femoral acetabular impingement and eosinophilia who presents for follow-up. The patient has developed bilateral stress fractures of the femoral neck s/p fixation by orthopedic surgery. DEXA scan was done in May 2022 and showed osteoporosis.   -DEXA in May 2022 showed T-score 2.5 of L1-L4 (Z-score of -1.9, L. forearm radius -2.3 (Z-score of -2.3)     Tymlos started in 7/22 and 5/204.  She could not tolerate Fosamax and Boniva.   Her repeat DXA is showing lowest T-score -1.8 with BMD of 0.726 int he distal 3rd of radius.      She is RF and antI-CCP antibody negative. CRP was normal. CONRAD and anti-cardiolipin antibodies negative    Chief Complaint: Follow up of OP      HPI: At today's visit, the patient reports feeling well. She has no recent fractures. Walking every day. Periods  are regular.     Review of Systems  Constitutional: No fatigue  Skin: No rash but Raynaud's  Head: No headache, oral ulcers or hair loss  Neck: No difficulty swallowing or choking  Eyes: Has dry eyes  Mouth: No dry mouth or oral ulcers  Pulmonary: No wheezing, pleurisy or SOB  Cardiovascular: No chest pain or palpitations or dizziness  Gastrointestinal: No abdominal pain, nausea or blood in stool  Musculoskeletal: As H/P        Active Problems  Problems    · Abnormal FSH level (259.9) (E34.9)   · Abnormal LH level (259.9) (E34.9)   · Acute hip pain (719.45) (M25.559)   · Acute lumbar radiculopathy (724.4) (M54.16)   · Arthritis (716.90) (M19.90)   · Bronchitis, acute (466.0) (J20.9)   · Encounter for hearing examination without abnormal findings (V72.19) (Z01.10)   · Encounter for routine gynecological examination (V72.31) (Z01.419)   · Added by Problem List Migration; 2013-7-21; Moved to Suppressed Nov 29 2013  9:11PM   · Eye irritation (379.99) (H57.89)   · Eye problems (V41.1) (H57.9)   · Finger injury, initial encounter (959.5) (S69.90XA)   · Hand arthritis (716.94) (M19.049)   · High risk medication use (V58.69) (Z79.899)   · Hip flexor tendinitis (727.09) (M76.899)   · Hypercoagulation syndrome (289.81) (D68.59)   · Impacted cerumen of both ears (380.4) (H61.23)   · Infected cat bite (879.9,E906.3) (W55.01XA)   · Left hip pain (719.45) (M25.552)   · Muscle contracture (728.85) (M62.40)   · Neck pain, chronic (723.1,338.29) (M54.2,G89.29)   · Onychomycosis (110.1) (B35.1)   · Orthopedic aftercare (V54.9) (Z47.89)   · Osteopenia (733.90) (M85.80)   · Osteoporosis, idiopathic (733.02) (M81.8)   · Pain in femur (733.90) (M89.8X5)   · Right hip pain (719.45) (M25.551)   · Right shoulder pain (719.41) (M25.511)   · Stress fracture of neck of left femur (733.96) (M84.352A)   · Stress fracture of neck of right femur (733.96) (M84.351A)   · Tinnitus of left ear (388.30) (H93.12)   · TMJ crepitus (524.64) (M26.69)     Past Medical History  Problems    · History of CVA (cerebral infarction) (434.91) (I63.9)   · History of Right hip pain (719.45) (M25.551)   · Resolved Date: 23 May 2021     Family History  Mother    · Family history of Carcinoma of breast, unspecified laterality   · menopausal   · Family history of essential hypertension (V17.49) (Z82.49)  Father    · Family history of diabetes mellitus (V18.0) (Z83.3)  Maternal Aunt    · Family history of Carcinoma of breast, unspecified laterality   · menopausal     Social History  Problems    · Does not use illicit drugs (V49.89) (Z78.9)   · Denied: History of domestic violence   · Never smoker   · Occasional alcohol use   · Occasionally uses seat belt     Allergies  NoKnown    · No Known Allergies   Recorded By: Chel, April; 6/26/2017 6:16:33 PM     Current Meds     Current Outpatient Medications   Medication Sig Dispense Refill    calcium citrate (Calcitrate) 200 mg (950  "mg) tablet Take 1 tablet (200 mg) by mouth once daily.      cyanocobalamin, vitamin B-12, (VITAMIN B-12 ORAL) Take by mouth.      ergocalciferol (Vitamin D-2) 1.25 MG (59855 UT) capsule Take 1 capsule (1.25 mg) by mouth 1 (one) time per week.      ferrous fumarate-vitamin C (Lucia-Sequeles 65-25) Take 1 tablet by mouth 3 times daily (morning, midday, late afternoon). Do not crush, chew, or split.      ibuprofen 600 mg tablet Take 1 tablet (600 mg) by mouth 3 times a day as needed for mild pain (1 - 3) (pain). 60 tablet 5    phytonadione, vit K1, (vitamin k) 100 mcg tablet Take 1 tablet (100 mcg) by mouth once daily.      TURMERIC ORAL Take by mouth.       No current facility-administered medications for this visit.          Vitals  Blood pressure 104/69, pulse 68, temperature 36.3 °C (97.3 °F), temperature source Oral, height 1.575 m (5' 2\"), weight 51.9 kg (114 lb 6.4 oz), SpO2 98%.  Physical Exam  General - NAD, sitting up in chair, well-groomed, pleasant, AAOx3  Head: Normocephalic, atraumatic  Eyes - PERRLA, EOMI. No conjunctiva injection.   Mouth/ENT - Moist oral and nasal mucosa.  Skin - No rashes or ulcers. Skin warm and dry. No erythema on bilateral cheeks.  Extremities - No edema, cyanosis ,or clubbing  Musculoskeletal -  EXAMJOINTDETAILED,  Back: Full range of motion         ORDERING CLINICIAN:  NIMESH PARK      TECHNIQUE:  DEXA BONE DENSITY      FINDINGS:  SPINE L1-L4  Bone Mineral Density: 1.062  T-Score -1.0  Z-Score -1.0  Bone Mineral Density change vs baseline:  0.185  Bone Mineral Density change vs previous: 0.185          LEFT FOREARM  Total  Bone Mineral Density: 0.553  T-Score -2.1  Z-Score -2.1  UD  Bone Mineral Density: 0.370  T-Score -2.2 Z-Score -2.2  MID  Bone Mineral Density: Not reported  T-Score Not reported Z-Score Not reported  1/3  Bone Mineral Density: 0.726  T-Score -1.8   Z-Score -1.8  Bone Mineral Density change vs baseline:  0.047  Bone Mineral Density change vs previous:  " 0.047      Assessment/plan:    39 yr old WF with Osteoporosis, most likely related to her heparin use in pregnancy.   Repeat DXA to assess any bone loss and monitor treatment.    Reviewed and approved by NIMESH PARK on 6/4/25 at 8:37 AM.

## 2025-06-11 ENCOUNTER — APPOINTMENT (OUTPATIENT)
Dept: INTEGRATIVE MEDICINE | Facility: CLINIC | Age: 40
End: 2025-06-11
Payer: COMMERCIAL

## 2025-06-12 NOTE — PROGRESS NOTES
Maribel Amador female   1985 39 y.o.   [id number]      Chief Complaint  High risk evaluation, left breast calcifications    History Of Present Illness  Maribel Amador is a very pleasant 39 y.o.  female [facility] evaluation and left breast calcifications. She has family history of breast cancer in her mother, age 50 with contralateral recurrence age 57 and maternal aunt, 60s. She denies breast surgery or biopsy. She is currently being followed for left breast calcifications since 2024.    BREAST IMAGIN2024 Bilateral Baseline screening mammogram, indicates BI-RADS Category 0. Left breast calcifications warranting additional views. 2024 Left diagnostic mammogram, indicates BI-RADS Category 3. Left breast, probably benign calcifications warranting short term follow up.      REPRODUCTIVE HISTORY: menarche age 11, , first birth age 27,  x 18 months, no OCP's, history of bilateral salpingectomy, premenopausal, regular monthly cycles, heterogeneously dense tissue    FAMILY CANCER HISTORY:   Mother: Breast cancer, age 50, with contralateral recurrence age 57, alive, unsure of Genetic testing  Maternal Aunt (1): Breast cancer, 60s    Surgical History  She has no past surgical history on file.     Social History  She reports that she has never smoked. She has never used smokeless tobacco. She reports that she does not drink alcohol and does not use drugs.    Family History  Family History   Problem Relation Name Age of Onset    Breast cancer Mother  50    Bilateral breast cancer Mother  51        twice    Peripheral vascular disease Mother          ulcers on her legs    Diabetes Father      Other (chronic hypertenstion) Father      Hyperlipidemia Father          Allergies  Fluorouracil-adhesive bandage    Medications  Current Outpatient Medications   Medication Instructions    calcium citrate (Calcitrate) 200 mg (950 mg) tablet 1  tablet, Daily    cyanocobalamin, vitamin B-12, (VITAMIN B-12 ORAL) Take by mouth.    ergocalciferol (VITAMIN D-2) 1.25 mg, Weekly    ferrous fumarate-vitamin C (Lucia-Sequeles 65-25) 1 tablet, 3 times daily (morning, midday, late afternoon)    ibuprofen 600 mg, oral, 3 times daily PRN    phytonadione (VITAMIN K) 100 mcg, Daily    TURMERIC ORAL Take by mouth.         REVIEW OF SYSTEMS    Constitutional:  Negative for appetite change, fatigue, fever and unexpected weight change.   HENT:  Negative for ear pain, hearing loss, nosebleeds, sore throat and trouble swallowing.    Eyes:  Negative for discharge, itching and visual disturbance.   Respiratory:  Negative for cough, chest tightness and shortness of breath.    Cardiovascular:  Negative for chest pain, palpitations and leg swelling.   Breast: as indicated in HPI  Gastrointestinal:  Negative for abdominal pain, constipation, diarrhea and nausea.   Endocrine: Negative for cold intolerance and heat intolerance.   Genitourinary:  Negative for dysuria, frequency, hematuria, pelvic pain and vaginal bleeding.   Musculoskeletal:  Negative for arthralgias, back pain, gait problem, joint swelling and myalgias.   Skin:  Negative for color change and rash.   Allergic/Immunologic: Negative for environmental allergies and food allergies.   Neurological:  Negative for dizziness, tremors, speech difficulty, weakness, numbness and headaches.   Hematological:  Does not bruise/bleed easily.   Psychiatric/Behavioral:  Negative for agitation, dysphoric mood and sleep disturbance. The patient is not nervous/anxious.         Past Medical History  She has a past medical history of Abnormal FSH level (09/20/2023), Abnormal LH level (09/20/2023), Cerebral infarction, unspecified (03/24/2015), Muscle contracture (09/20/2023), Pain in right hip (04/24/2015), and Stress fracture of neck of left femur (09/20/2023).     Physical Exam  Patient is alert and oriented x3 and in a relaxed and  appropriate mood. Her gait is steady and hand grasps are equal. Sclera is clear. The breasts are nearly symmetrical. The tissue is dense without palpable abnormalities, discrete nodules or masses. The skin and nipples appear normal. There is no cervical, supraclavicular or axillary lymphadenopathy. Heart rate and rhythm normal, S1 and S2 appreciated. The lungs are clear to auscultation bilaterally.     Physical Exam     Last Recorded Vitals  Vitals:    06/30/25 0942   BP: 113/75   Pulse: 80   Temp: 36.8 °C (98.3 °F)   SpO2: 97%         Relevant Results   Time was spent viewing digital images of the radiology testing with the patient. I explained the results in depth, along with suggested explanation for follow up recommendations based on the testing results. BI-RADS Category 3      Imaging    arrative & Impression   Interpreted By:  Xander Lenz,   STUDY:  BI MAMMO LEFT DIAGNOSTIC;  6/30/2025 9:31 am      ACCESSION NUMBER(S):  [id number]      ORDERING CLINICIAN:  MILE RIOS      INDICATION:  The patient presents for initial short-term follow-up of probably  benign left breast calcifications.      ,R92.1 Mammographic calcification found on diagnostic imaging of  breast      COMPARISON:  12/30/2024, 12/04/2024      FINDINGS:  MAMMOGRAPHY: 2D and tomosynthesis images were reviewed at 1 mm slice  thickness.      Density:  The breasts are heterogeneously dense, which may obscure  small masses.      Stable loosely grouped calcifications are again seen in the inferior  left breast at posterior depth. The calcifications again could not be  seen on the craniocaudal view.      IMPRESSION:  Stable probably benign left breast calcifications documenting 6  months of stability. Short-term follow-up diagnostic mammogram in 6  months at the time of the patient's bilateral annual mammogram is  recommended.      BI-RADS CATEGORY:      BI-RADS Category:  3 Probably Benign.  Recommendation:  Short-term Interval Follow-up  Imaging.  Recommended Date:  6 Months.  Laterality:  Left.       Risk Profile        Assessment/Plan   Normal clinical exam, stable imaging, left breast calcifications, high risk surveillance care, family history of breast cancer, heterogeneously dense tissue    Plan: Return in December 2025 for bilateral diagnostic mammogram and office visit. Denise Raya with mother's bilateral breast cancer and only one aunt. Were first ran with two aunts and mother with unilateral cancer. She prefers to wait until December 2025 to discuss MRI again. Endocrine therapy not strongly recommended at this time, low five year risk (1.8%).     Patient Discussion/Summary  Your clinical examination and imaging are stable. Please return in December 2025 for bilateral diagnostic mammogram and office visit or sooner if you have any problems or concerns.     High risk breast surveillance care plan:  Yearly mammogram with digital breast tomosynthesis  Twice yearly clinical breast examinations  Breast MRI - Full MRI recommended, discuss again at next visit  Monthly self breast examinations  Vitamin D3 2000 IU/daily (over the counter)  Exercise 3-4 times per week for 45-60 minutes  Limit alcohol to 3-4 drinks per week   Eat a healthy low-fat diet with lots of fruits and vegetables  Risk model indicates you are eligible for endocrine therapy with Tamoxifen. Endocrine therapy reduces lifetime risk of breast cancer by 50% when taken for 5 years.    You can see your health information, review clinical summaries from office visits & test results online when you follow your health with MY  Chart, a personal health record. To sign up go to [email or web address]. If you need assistance with signing up or trouble getting into your account call [employer] Patient Line 24/7 at [phone].    My office phone number is [phone] if you need to get in touch with me or have additional questions or concerns. Thank you for choosing [facility] and trusting me as your healthcare provider. I look forward to seeing you again at your next office visit. I am honored to be a provider on your health care team and I remain dedicated to helping you achieve your health goals.      Maida Hinojosa, APRN-CNP

## 2025-06-13 ENCOUNTER — ALLIED HEALTH (OUTPATIENT)
Dept: INTEGRATIVE MEDICINE | Facility: CLINIC | Age: 40
End: 2025-06-13
Payer: COMMERCIAL

## 2025-06-13 DIAGNOSIS — F41.9 ANXIETY: Primary | ICD-10-CM

## 2025-06-13 PROCEDURE — 97811 ACUP 1/> W/O ESTIM EA ADD 15: CPT | Performed by: NATUROPATH

## 2025-06-13 PROCEDURE — 97810 ACUP 1/> WO ESTIM 1ST 15 MIN: CPT | Performed by: NATUROPATH

## 2025-06-13 ASSESSMENT — ENCOUNTER SYMPTOMS
CONSTIPATION: 0
NAUSEA: 0
WHEEZING: 0
CHILLS: 0
COUGH: 0
SHORTNESS OF BREATH: 0
HEADACHES: 1
FEVER: 0
ARTHRALGIAS: 1
PALPITATIONS: 0
VOMITING: 0
RHINORRHEA: 0
DIFFICULTY URINATING: 0
TROUBLE SWALLOWING: 0

## 2025-06-13 NOTE — PROGRESS NOTES
Acupuncture Visit:     Subjective   Patient ID: Maribel Amador is a 39 y.o. female who presents for No chief complaint on file.  Knee feeling  Sleep good  Anxiety- a lot better,  feels calmer, is being more active with dog.  BM normal         Knee still doung well  Sleep- falling asleep and staying asleep  Anixety- very low.   BM normal-          Knee feeling well, no pain  Sleep- good, no issues  Anxiety- better  BM- normal.         Knee pain still slight, medial and distal to patella  Sleep- Sleep   Energy is good  Anxiety has been stable, low  BM normal.           Knee pain is improving with PT  Wall squats hurt  Sleep- OK overall, a little hard staying asleep recently.   Anxiety- OK, worse due to poor sleep.   Energy lower due to poor sleep      Some knee pain recently, more than in past  Has seen ortho  Sleep- doing well.   Anxiety- sill mostly related to cycle, but good.   BM good  Energy good.         Poor sleep last night,  kept waking up  Rest of the week was OK  Anxiety- OK, feels related to cycle mostly now  BM- good.       Sleep still off, work shift has been irregular  Still pain in knuckles  Anxiety is generally low  BM good.     previous  Sleeping well  Has been back on bike-  sore but feeling well.  Pain- still sore in knuckles.   BM- low  Anxiety-  good.              Sleep still improved   Anxiety low  Pain generally low, weather makes stiff, but better after she gets moving  BM normal          Sleep getting back to normal  Anxiety - better,   No pain  BM normal        Sleep- trouble staying asleep. Bad post surgery  No pain now from procedure  Anxiety increased [place] to poor sleep.         Sleep back to normal, staying asleep   No pain today.   Anxiety- much better,  no little attacks.   BM normal        Sleep off the past few nights. Hard time staying asleep  Bone pain gone  Anxiety- OK  BM- normal.       Previous  Bone pain has cleared  Anxiety- low   Sleep good, off night last  night  BM-    Previous  Bone pain is improving from medication, but still present  Worse in AM  Arms and shoulders  Anxiety- improved   Sleep- improved, better since less pain.       Previous  Had same SE with boniva, pain in neck mid back  Elbows, shoulder  Pain reduced since stopping  Anxiety still improved  Sleep- hard due to SE, pain when laying        Sleep has been more consistety  Anxiety ahs been stable.   Started on Boniva today, had bad SE with previous meds  Energy good        Previous  Feeling well  4 days of sleep was off, more anxiety since  Has reduced back to normal recently.   No issues with lavela.     Energy good  BM normal            Feels more relaxed overall  Had one episode of more anxiety, was able to clear quickly  Started on Lavela, no issues  Sleep- A  lot better, still waking occ in middle of night, able to get back to sleep;     Energy- good.     BM good.     Started fosamax, had significant bone josefina muscle pain, had to stop. Has FU with PCP for dosing changes            Initial:  Anxiety-  random can wake middle of night  May happen during the day  Can go away quickly or hang for a week  Can feel heart racing, feels sweaty, no nausea, some HA  No diarrhea.   Deep breathing, biking, walking, reading,   Cooking  Tried ashwagandha with no benefit  Was Rx hydroxyzine, but takes rarely PRN      Sleep- tried to relax 8-10p, not always falling asleep easily. Wakes around 4-430, sometimes falls back asleep.   No meds for sleep    Energy- no issues    Hx of stroke at 14,   Broke both hips from running, dx with osteoporosis. , some pain from surgeries from screws. Occ numbness in right leg to foot post surgery, comes and goes.     Diet:   B: oatmeal, eggs, pb toast, banana  eggs avocado, milk,   L: salad, chickpeas, avocoado,   D; salmon, chicken  Sn: smoothie, yogurt, berries  Dr: water, coffee, latte, tea    BM: daily, well formed.                           Review of Systems   Constitutional:   Negative for chills and fever.   HENT:  Positive for tinnitus (has seen EENT, left is worse). Negative for congestion, ear pain, rhinorrhea and trouble swallowing.    Eyes:  Negative for visual disturbance.   Respiratory:  Negative for cough, shortness of breath and wheezing.    Cardiovascular:  Negative for chest pain and palpitations.   Gastrointestinal:  Negative for constipation, nausea and vomiting.   Genitourinary:  Negative for difficulty urinating and menstrual problem.   Musculoskeletal:  Positive for arthralgias (elbows and hands,).   Skin:  Negative for rash.   Neurological:  Positive for headaches (occ with cycle.).            Provider reviewed plan for the acupuncture session, precautions and contraindications. Patient/guardian/hospital staff has given consent to treat with full understanding of what to expect during the session. Before acupuncture began, provider explained to the patient to communicate at any time if the procedure was causing discomfort past their tolerance level. Patient agreed to advise acupuncturist. The acupuncturist counseled the patient on the risks of acupuncture treatment including pain, infection, bleeding, and no relief of pain. The patient was positioned comfortably. There was no evidence of infection at the site of needle insertions.    Objective   Physical Exam         Row Name 07/03/24 0819   Acupuncture Treatment   Body Points - Bilateral Jina baker may, yin tong, Li4, HT7, SP6, KI6, LR3   Auricular Points Yes   Auricular Points - Bilateral BFA 1-3   Other Techniques Utilized TDP Lamp   TDP Lamp Descripton feet   Needle Count In 20   Needle Count Out 20   Needle Retention Time (min) 25 minutes   Total Face to Face Time (min) 25 minutes                         Assessment/Plan   There are no diagnoses linked to this encounter.                                        Supplement recommendations:  Gina FALCON 1265 (60 Softgels) ([employer]): Please take  1 gel,  twice / day  ongoing (with a full glass of water. If you get lavender burps and they are bothersome, you can but the whole package in the freezer and take the capsules from there. )

## 2025-06-25 ENCOUNTER — HOSPITAL ENCOUNTER (OUTPATIENT)
Dept: RADIOLOGY | Facility: CLINIC | Age: 40
Discharge: HOME | End: 2025-06-25
Payer: COMMERCIAL

## 2025-06-25 DIAGNOSIS — Z79.899 ENCOUNTER FOR MONITORING TERIPARATIDE THERAPY: ICD-10-CM

## 2025-06-25 DIAGNOSIS — Z51.81 ENCOUNTER FOR MONITORING TERIPARATIDE THERAPY: ICD-10-CM

## 2025-06-25 PROCEDURE — 77080 DXA BONE DENSITY AXIAL: CPT

## 2025-06-25 PROCEDURE — 77080 DXA BONE DENSITY AXIAL: CPT | Performed by: RADIOLOGY

## 2025-06-27 ENCOUNTER — ALLIED HEALTH (OUTPATIENT)
Dept: INTEGRATIVE MEDICINE | Facility: CLINIC | Age: 40
End: 2025-06-27
Payer: COMMERCIAL

## 2025-06-27 DIAGNOSIS — F41.9 ANXIETY: Primary | ICD-10-CM

## 2025-06-27 PROCEDURE — 97810 ACUP 1/> WO ESTIM 1ST 15 MIN: CPT | Performed by: NATUROPATH

## 2025-06-27 PROCEDURE — 97811 ACUP 1/> W/O ESTIM EA ADD 15: CPT | Performed by: NATUROPATH

## 2025-06-27 ASSESSMENT — ENCOUNTER SYMPTOMS
RHINORRHEA: 0
NAUSEA: 0
SHORTNESS OF BREATH: 0
FEVER: 0
COUGH: 0
VOMITING: 0
HEADACHES: 1
TROUBLE SWALLOWING: 0
WHEEZING: 0
CHILLS: 0
PALPITATIONS: 0
ARTHRALGIAS: 1
DIFFICULTY URINATING: 0
CONSTIPATION: 0

## 2025-06-27 NOTE — PROGRESS NOTES
Acupuncture Visit:     Subjective   Patient ID: Maribel Amador is a 39 y.o. female who presents for No chief complaint on file.  Sleep- good, no issues  Anxiety- still improved,  BM- normal   Knee still good.         Knee feeling  Sleep good  Anxiety- a lot better,  feels calmer, is being more active with dog.  BM normal         Knee still doung well  Sleep- falling asleep and staying asleep  Anixety- very low.   BM normal-          Knee feeling well, no pain  Sleep- good, no issues  Anxiety- better  BM- normal.         Knee pain still slight, medial and distal to patella  Sleep- Sleep   Energy is good  Anxiety has been stable, low  BM normal.           Knee pain is improving with PT  Wall squats hurt  Sleep- OK overall, a little hard staying asleep recently.   Anxiety- OK, worse due to poor sleep.   Energy lower due to poor sleep      Some knee pain recently, more than in past  Has seen ortho  Sleep- doing well.   Anxiety- sill mostly related to cycle, but good.   BM good  Energy good.         Poor sleep last night,  kept waking up  Rest of the week was OK  Anxiety- OK, feels related to cycle mostly now  BM- good.       Sleep still off, work shift has been irregular  Still pain in knuckles  Anxiety is generally low  BM good.     previous  Sleeping well  Has been back on bike-  sore but feeling well.  Pain- still sore in knuckles.   BM- low  Anxiety-  good.              Sleep still improved   Anxiety low  Pain generally low, weather makes stiff, but better after she gets moving  BM normal          Sleep getting back to normal  Anxiety - better,   No pain  BM normal        Sleep- trouble staying asleep. Bad post surgery  No pain now from procedure  Anxiety increased [place] to poor sleep.         Sleep back to normal, staying asleep   No pain today.   Anxiety- much better,  no little attacks.   BM normal        Sleep off the past few nights. Hard time staying asleep  Bone pain gone  Anxiety- OK  BM- normal.        Previous  Bone pain has cleared  Anxiety- low   Sleep good, off night last night  BM-    Previous  Bone pain is improving from medication, but still present  Worse in AM  Arms and shoulders  Anxiety- improved   Sleep- improved, better since less pain.       Previous  Had same SE with boniva, pain in neck mid back  Elbows, shoulder  Pain reduced since stopping  Anxiety still improved  Sleep- hard due to SE, pain when laying        Sleep has been more consistety  Anxiety ahs been stable.   Started on Boniva today, had bad SE with previous meds  Energy good        Previous  Feeling well  4 days of sleep was off, more anxiety since  Has reduced back to normal recently.   No issues with lavela.     Energy good  BM normal            Feels more relaxed overall  Had one episode of more anxiety, was able to clear quickly  Started on Lavela, no issues  Sleep- A  lot better, still waking occ in middle of night, able to get back to sleep;     Energy- good.     BM good.     Started fosamax, had significant bone josefina muscle pain, had to stop. Has FU with PCP for dosing changes            Initial:  Anxiety-  random can wake middle of night  May happen during the day  Can go away quickly or hang for a week  Can feel heart racing, feels sweaty, no nausea, some HA  No diarrhea.   Deep breathing, biking, walking, reading,   Cooking  Tried ashwagandha with no benefit  Was Rx hydroxyzine, but takes rarely PRN      Sleep- tried to relax 8-10p, not always falling asleep easily. Wakes around 4-430, sometimes falls back asleep.   No meds for sleep    Energy- no issues    Hx of stroke at 14,   Broke both hips from running, dx with osteoporosis. , some pain from surgeries from screws. Occ numbness in right leg to foot post surgery, comes and goes.     Diet:   B: oatmeal, eggs, pb toast, banana  eggs avocado, milk,   L: salad, chickpeas, avocoado,   D; salmon, chicken  Sn: smoothie, yogurt, berries  Dr: water, coffee, latte, tea    BM:  daily, well formed.                         Review of Systems   Constitutional:  Negative for chills and fever.   HENT:  Positive for tinnitus (has seen EENT, left is worse). Negative for congestion, ear pain, rhinorrhea and trouble swallowing.    Eyes:  Negative for visual disturbance.   Respiratory:  Negative for cough, shortness of breath and wheezing.    Cardiovascular:  Negative for chest pain and palpitations.   Gastrointestinal:  Negative for constipation, nausea and vomiting.   Genitourinary:  Negative for difficulty urinating and menstrual problem.   Musculoskeletal:  Positive for arthralgias (elbows and hands,).   Skin:  Negative for rash.   Neurological:  Positive for headaches (occ with cycle.).            Provider reviewed plan for the acupuncture session, precautions and contraindications. Patient/guardian/hospital staff has given consent to treat with full understanding of what to expect during the session. Before acupuncture began, provider explained to the patient to communicate at any time if the procedure was causing discomfort past their tolerance level. Patient agreed to advise acupuncturist. The acupuncturist counseled the patient on the risks of acupuncture treatment including pain, infection, bleeding, and no relief of pain. The patient was positioned comfortably. There was no evidence of infection at the site of needle insertions.    Objective   Physical Exam         Row Name 07/03/24 0819   Acupuncture Treatment   Body Points - Bilateral Jina baker may, yin tong, Li4, HT7, SP6, KI6, LR3   Auricular Points Yes   Auricular Points - Bilateral BFA 1-3   Other Techniques Utilized TDP Lamp   TDP Lamp Descripton feet   Needle Count In 20   Needle Count Out 20   Needle Retention Time (min) 25 minutes   Total Face to Face Time (min) 25 minutes                         Assessment/Plan   There are no diagnoses linked to this encounter.                                          Supplement  recommendations:  Gina WS 1265 (60 Softgels) ([employer]): Please take  1 gel, twice / day  ongoing (with a full glass of water. If you get lavender burps and they are bothersome, you can but the whole package in the freezer and take the capsules from there. )

## 2025-06-30 ENCOUNTER — OFFICE VISIT (OUTPATIENT)
Dept: SURGICAL ONCOLOGY | Facility: CLINIC | Age: 40
End: 2025-06-30
Payer: COMMERCIAL

## 2025-06-30 ENCOUNTER — HOSPITAL ENCOUNTER (OUTPATIENT)
Dept: RADIOLOGY | Facility: CLINIC | Age: 40
Discharge: HOME | End: 2025-06-30
Payer: COMMERCIAL

## 2025-06-30 VITALS — WEIGHT: 114.42 LBS | BODY MASS INDEX: 21.06 KG/M2 | HEIGHT: 62 IN

## 2025-06-30 VITALS
DIASTOLIC BLOOD PRESSURE: 75 MMHG | SYSTOLIC BLOOD PRESSURE: 113 MMHG | HEART RATE: 80 BPM | WEIGHT: 113.1 LBS | OXYGEN SATURATION: 97 % | TEMPERATURE: 98.3 F | BODY MASS INDEX: 20.68 KG/M2

## 2025-06-30 DIAGNOSIS — R92.1 CALCIFICATION OF LEFT BREAST: ICD-10-CM

## 2025-06-30 DIAGNOSIS — R92.30 DENSE BREAST TISSUE: ICD-10-CM

## 2025-06-30 DIAGNOSIS — Z12.39 BREAST CANCER SCREENING, HIGH RISK PATIENT: Primary | ICD-10-CM

## 2025-06-30 PROCEDURE — 77065 DX MAMMO INCL CAD UNI: CPT | Mod: LT

## 2025-06-30 PROCEDURE — 77065 DX MAMMO INCL CAD UNI: CPT | Mod: LEFT SIDE | Performed by: STUDENT IN AN ORGANIZED HEALTH CARE EDUCATION/TRAINING PROGRAM

## 2025-06-30 PROCEDURE — 1036F TOBACCO NON-USER: CPT | Performed by: NURSE PRACTITIONER

## 2025-06-30 PROCEDURE — 99214 OFFICE O/P EST MOD 30 MIN: CPT | Performed by: NURSE PRACTITIONER

## 2025-06-30 ASSESSMENT — PAIN SCALES - GENERAL: PAINLEVEL_OUTOF10: 0-NO PAIN

## 2025-06-30 NOTE — PATIENT INSTRUCTIONS
Your clinical examination and imaging are stable. Please return in December 2025 for bilateral diagnostic mammogram and office visit or sooner if you have any problems or concerns.     High risk breast surveillance care plan:  Yearly mammogram with digital breast tomosynthesis  Twice yearly clinical breast examinations  Breast MRI - Full MRI recommended, discuss again at next visit  Monthly self breast examinations  Vitamin D3 2000 IU/daily (over the counter)  Exercise 3-4 times per week for 45-60 minutes  Limit alcohol to 3-4 drinks per week   Eat a healthy low-fat diet with lots of fruits and vegetables  Risk model indicates you are eligible for endocrine therapy with Tamoxifen. Endocrine therapy reduces lifetime risk of breast cancer by 50% when taken for 5 years.    You can see your health information, review clinical summaries from office visits & test results online when you follow your health with MY  Chart, a personal health record. To sign up go to [email or web address]. If you need assistance with signing up or trouble getting into your account call [employer] Patient Line 24/7 at [phone].    My office phone number is [phone] if you need to get in touch with me or have additional questions or concerns. Thank you for choosing [facility] and trusting me as your healthcare provider. I look forward to seeing you again at your next office visit. I am honored to be a provider on your health care team and I remain dedicated to helping you achieve your health goals.

## 2025-07-01 LAB
25(OH)D3+25(OH)D2 SERPL-MCNC: 53 NG/ML (ref 30–100)
CALCIUM SERPL-MCNC: 9.5 MG/DL (ref 8.6–10.2)
PTH-INTACT SERPL-MCNC: 18 PG/ML (ref 16–77)

## 2025-07-02 ENCOUNTER — APPOINTMENT (OUTPATIENT)
Dept: INTEGRATIVE MEDICINE | Facility: CLINIC | Age: 40
End: 2025-07-02
Payer: COMMERCIAL

## 2025-07-03 ENCOUNTER — CLINICAL SUPPORT (OUTPATIENT)
Dept: SLEEP MEDICINE | Facility: CLINIC | Age: 40
End: 2025-07-03
Payer: COMMERCIAL

## 2025-07-03 DIAGNOSIS — G47.30 SLEEP APNEA, UNSPECIFIED TYPE: ICD-10-CM

## 2025-07-04 VITALS
HEART RATE: 54 BPM | WEIGHT: 110.23 LBS | BODY MASS INDEX: 22.22 KG/M2 | HEIGHT: 59 IN | SYSTOLIC BLOOD PRESSURE: 115 MMHG | DIASTOLIC BLOOD PRESSURE: 70 MMHG | RESPIRATION RATE: 12 BRPM | OXYGEN SATURATION: 99 %

## 2025-07-04 NOTE — PROGRESS NOTES
[facility] TECH NOTE:     Patient: Maribel Amador   MRN//AGE: [id number]  1985  39 y.o.   Technologist: Carin Verduzco   Room: 439A   Service Date: 2025        Sleep Testing Location: [facility] Sleep lab    Lindsay: No data recorded    TECHNOLOGIST SLEEP STUDY PROCEDURE NOTE:   This sleep study is being conducted according to the policies and procedures outlined by the AAS accreditation standards.  The sleep study procedure and processes involved during this appointment was explained to the patient/patient’s family, questions were answered. The patient/family verbalized understanding.      The patient is a 39 y.o. year old female scheduled for a PSG with montage of: PSG. She arrived for her appointment.      The study that was ultimately completed was a PSG with montage of: PSG.    The full study Was completed.  Patient questionnaires completed?: yes     Consents signed? yes    Initial Fall Risk Screening:     Maribel has not fallen in the last 6 months. Her fall did not result in injury. Maribel does not have a fear of falling. She does not need assistance with sitting, standing, or walking. She does not need assistance walking in her home. She does not need assistance in an unfamiliar setting. The patient is not using an assistive device.     Brief Study observations: Patient is a 39 year old female, here today for a PSG.     Deviation to order/protocol and reason: None      If PAP, which was preferred mask/pressure/mode:       Other: None    After the procedure, the patient/family was informed to ensure followup with ordering clinician for testing results.      Technologist: Carin Verduzco

## 2025-07-15 ENCOUNTER — APPOINTMENT (OUTPATIENT)
Dept: INTEGRATIVE MEDICINE | Facility: CLINIC | Age: 40
End: 2025-07-15
Payer: COMMERCIAL

## 2025-07-15 DIAGNOSIS — F41.9 ANXIETY: Primary | ICD-10-CM

## 2025-07-15 PROCEDURE — 97811 ACUP 1/> W/O ESTIM EA ADD 15: CPT | Performed by: NATUROPATH

## 2025-07-15 PROCEDURE — 97810 ACUP 1/> WO ESTIM 1ST 15 MIN: CPT | Performed by: NATUROPATH

## 2025-07-15 ASSESSMENT — ENCOUNTER SYMPTOMS
TROUBLE SWALLOWING: 0
NAUSEA: 0
RHINORRHEA: 0
CHILLS: 0
DIFFICULTY URINATING: 0
WHEEZING: 0
SHORTNESS OF BREATH: 0
PALPITATIONS: 0
VOMITING: 0
HEADACHES: 1
COUGH: 0
FEVER: 0
ARTHRALGIAS: 1
CONSTIPATION: 0

## 2025-07-15 NOTE — PROGRESS NOTES
Acupuncture Visit:     Subjective   Patient ID: Maribel Amador is a 39 y.o. female who presents for No chief complaint on file.  Anxiety- off last week due to restarting work  Sleep- good also was off last week  BM- normal  Knee good, no issues      Sleep- good, no issues  Anxiety- still improved,  BM- normal   Knee still good.         Knee feeling  Sleep good  Anxiety- a lot better,  feels calmer, is being more active with dog.  BM normal         Knee still doung well  Sleep- falling asleep and staying asleep  Anixety- very low.   BM normal-          Knee feeling well, no pain  Sleep- good, no issues  Anxiety- better  BM- normal.         Knee pain still slight, medial and distal to patella  Sleep- Sleep   Energy is good  Anxiety has been stable, low  BM normal.           Knee pain is improving with PT  Wall squats hurt  Sleep- OK overall, a little hard staying asleep recently.   Anxiety- OK, worse due to poor sleep.   Energy lower due to poor sleep      Some knee pain recently, more than in past  Has seen ortho  Sleep- doing well.   Anxiety- sill mostly related to cycle, but good.   BM good  Energy good.         Poor sleep last night,  kept waking up  Rest of the week was OK  Anxiety- OK, feels related to cycle mostly now  BM- good.       Sleep still off, work shift has been irregular  Still pain in knuckles  Anxiety is generally low  BM good.     previous  Sleeping well  Has been back on bike-  sore but feeling well.  Pain- still sore in knuckles.   BM- low  Anxiety-  good.              Sleep still improved   Anxiety low  Pain generally low, weather makes stiff, but better after she gets moving  BM normal          Sleep getting back to normal  Anxiety - better,   No pain  BM normal        Sleep- trouble staying asleep. Bad post surgery  No pain now from procedure  Anxiety increased [place] to poor sleep.         Sleep back to normal, staying asleep   No pain today.   Anxiety- much better,  no little attacks.    BM normal        Sleep off the past few nights. Hard time staying asleep  Bone pain gone  Anxiety- OK  BM- normal.       Previous  Bone pain has cleared  Anxiety- low   Sleep good, off night last night  BM-    Previous  Bone pain is improving from medication, but still present  Worse in AM  Arms and shoulders  Anxiety- improved   Sleep- improved, better since less pain.       Previous  Had same SE with boniva, pain in neck mid back  Elbows, shoulder  Pain reduced since stopping  Anxiety still improved  Sleep- hard due to SE, pain when laying        Sleep has been more consistety  Anxiety ahs been stable.   Started on Boniva today, had bad SE with previous meds  Energy good        Previous  Feeling well  4 days of sleep was off, more anxiety since  Has reduced back to normal recently.   No issues with lavela.     Energy good  BM normal            Feels more relaxed overall  Had one episode of more anxiety, was able to clear quickly  Started on Lavela, no issues  Sleep- A  lot better, still waking occ in middle of night, able to get back to sleep;     Energy- good.     BM good.     Started fosamax, had significant bone josefina muscle pain, had to stop. Has FU with PCP for dosing changes            Initial:  Anxiety-  random can wake middle of night  May happen during the day  Can go away quickly or hang for a week  Can feel heart racing, feels sweaty, no nausea, some HA  No diarrhea.   Deep breathing, biking, walking, reading,   Cooking  Tried ashwagandha with no benefit  Was Rx hydroxyzine, but takes rarely PRN      Sleep- tried to relax 8-10p, not always falling asleep easily. Wakes around 4-430, sometimes falls back asleep.   No meds for sleep    Energy- no issues    Hx of stroke at 14,   Broke both hips from running, dx with osteoporosis. , some pain from surgeries from screws. Occ numbness in right leg to foot post surgery, comes and goes.     Diet:   B: oatmeal, eggs, pb toast, banana  eggs avocado, milk,   L:  pierre manzano avocoado,   WATSON; salmon, chicken  Sn: smoothie, yogurt, berries  Dr: water, coffee, latte, tea    BM: daily, well formed.                 Session Information  Is this acupuncture treatment being billed to the patient's insurance company: Yes  This is visit number: 11  The patient has a total number of visits of: 20  Initial Acupuncture Treatment date: 01/17/25  Name of Insurance Company:   Visit Type: Follow-up visit  Medical History Reviewed: I have reviewed pertinent medical history in EHR, and no contraindications are present to provide treatment         Review of Systems   Constitutional:  Negative for chills and fever.   HENT:  Positive for tinnitus (has seen EENT, left is worse). Negative for congestion, ear pain, rhinorrhea and trouble swallowing.    Eyes:  Negative for visual disturbance.   Respiratory:  Negative for cough, shortness of breath and wheezing.    Cardiovascular:  Negative for chest pain and palpitations.   Gastrointestinal:  Negative for constipation, nausea and vomiting.   Genitourinary:  Negative for difficulty urinating and menstrual problem.   Musculoskeletal:  Positive for arthralgias (elbows and hands,).   Skin:  Negative for rash.   Neurological:  Positive for headaches (occ with cycle.).            Provider reviewed plan for the acupuncture session, precautions and contraindications. Patient/guardian/hospital staff has given consent to treat with full understanding of what to expect during the session. Before acupuncture began, provider explained to the patient to communicate at any time if the procedure was causing discomfort past their tolerance level. Patient agreed to advise acupuncturist. The acupuncturist counseled the patient on the risks of acupuncture treatment including pain, infection, bleeding, and no relief of pain. The patient was positioned comfortably. There was no evidence of infection at the site of needle insertions.    Objective   Physical Exam          Row Name 07/03/24 0819   Acupuncture Treatment   Body Points - Bilateral Jina olivia olvera, yin tong, Li4, HT7, SP6, KI6, LR3   Auricular Points Yes   Auricular Points - Bilateral BFA 1-3   Other Techniques Utilized TDP Lamp   TDP Lamp Descripton feet   Needle Count In 20   Needle Count Out 20   Needle Retention Time (min) 25 minutes   Total Face to Face Time (min) 25 minutes                         Assessment/Plan   Diagnoses and all orders for this visit:  Anxiety (Primary)                                              Supplement recommendations:  Gina WS 1265 (60 Softgels) ([employer]): Please take  1 gel, twice / day  ongoing (with a full glass of water. If you get lavender burps and they are bothersome, you can but the whole package in the freezer and take the capsules from there. )

## 2025-08-15 ENCOUNTER — APPOINTMENT (OUTPATIENT)
Dept: INTEGRATIVE MEDICINE | Facility: CLINIC | Age: 40
End: 2025-08-15
Payer: COMMERCIAL

## 2025-08-15 DIAGNOSIS — F41.9 ANXIETY: Primary | ICD-10-CM

## 2025-08-15 PROCEDURE — 97811 ACUP 1/> W/O ESTIM EA ADD 15: CPT | Performed by: NATUROPATH

## 2025-08-15 PROCEDURE — 97810 ACUP 1/> WO ESTIM 1ST 15 MIN: CPT | Performed by: NATUROPATH

## 2025-08-15 ASSESSMENT — ENCOUNTER SYMPTOMS
FEVER: 0
ARTHRALGIAS: 1
COUGH: 0
VOMITING: 0
WHEEZING: 0
SHORTNESS OF BREATH: 0
TROUBLE SWALLOWING: 0
DIFFICULTY URINATING: 0
CHILLS: 0
NAUSEA: 0
PALPITATIONS: 0
RHINORRHEA: 0
HEADACHES: 1
CONSTIPATION: 0

## 2025-08-19 ENCOUNTER — APPOINTMENT (OUTPATIENT)
Dept: INTEGRATIVE MEDICINE | Facility: CLINIC | Age: 40
End: 2025-08-19
Payer: COMMERCIAL

## 2025-08-19 VITALS
BODY MASS INDEX: 22.98 KG/M2 | HEIGHT: 59 IN | WEIGHT: 114 LBS | HEART RATE: 83 BPM | DIASTOLIC BLOOD PRESSURE: 65 MMHG | SYSTOLIC BLOOD PRESSURE: 100 MMHG

## 2025-08-19 DIAGNOSIS — E55.9 VITAMIN D DEFICIENCY: ICD-10-CM

## 2025-08-19 DIAGNOSIS — M81.0 OSTEOPOROSIS, UNSPECIFIED OSTEOPOROSIS TYPE, UNSPECIFIED PATHOLOGICAL FRACTURE PRESENCE: ICD-10-CM

## 2025-08-19 DIAGNOSIS — E53.8 VITAMIN B12 DEFICIENCY: ICD-10-CM

## 2025-08-19 DIAGNOSIS — F41.9 ANXIETY: Primary | ICD-10-CM

## 2025-08-19 PROCEDURE — 99215 OFFICE O/P EST HI 40 MIN: CPT | Performed by: INTERNAL MEDICINE

## 2025-08-19 ASSESSMENT — ENCOUNTER SYMPTOMS
FEVER: 0
CONSTIPATION: 0
HEADACHES: 0
DIFFICULTY URINATING: 0
BACK PAIN: 0
ABDOMINAL DISTENTION: 0
NUMBNESS: 0
APPETITE CHANGE: 0
DIZZINESS: 1
UNEXPECTED WEIGHT CHANGE: 0
SLEEP DISTURBANCE: 0
NERVOUS/ANXIOUS: 1
FATIGUE: 0
DIARRHEA: 0
ABDOMINAL PAIN: 0
DYSURIA: 0
MYALGIAS: 0
WEAKNESS: 0
SHORTNESS OF BREATH: 0
COUGH: 0
ARTHRALGIAS: 0
FREQUENCY: 0

## 2025-08-25 ENCOUNTER — PHARMACY VISIT (OUTPATIENT)
Dept: PHARMACY | Facility: CLINIC | Age: 40
End: 2025-08-25
Payer: COMMERCIAL

## 2025-08-25 ENCOUNTER — APPOINTMENT (OUTPATIENT)
Dept: PRIMARY CARE | Facility: CLINIC | Age: 40
End: 2025-08-25
Payer: COMMERCIAL

## 2025-08-25 VITALS
BODY MASS INDEX: 22.38 KG/M2 | WEIGHT: 111 LBS | SYSTOLIC BLOOD PRESSURE: 117 MMHG | OXYGEN SATURATION: 99 % | DIASTOLIC BLOOD PRESSURE: 70 MMHG | HEART RATE: 86 BPM | HEIGHT: 59 IN

## 2025-08-25 DIAGNOSIS — Z00.00 HEALTHCARE MAINTENANCE: Primary | ICD-10-CM

## 2025-08-25 DIAGNOSIS — E78.2 MODERATE MIXED HYPERLIPIDEMIA NOT REQUIRING STATIN THERAPY: ICD-10-CM

## 2025-08-25 DIAGNOSIS — E53.8 B12 DEFICIENCY: ICD-10-CM

## 2025-08-25 DIAGNOSIS — F41.9 ANXIETY: ICD-10-CM

## 2025-08-25 PROBLEM — D68.59 PROTEIN C DEFICIENCY (MULTI): Status: RESOLVED | Noted: 2023-11-21 | Resolved: 2025-08-25

## 2025-08-25 PROCEDURE — 3008F BODY MASS INDEX DOCD: CPT | Performed by: STUDENT IN AN ORGANIZED HEALTH CARE EDUCATION/TRAINING PROGRAM

## 2025-08-25 PROCEDURE — 99396 PREV VISIT EST AGE 40-64: CPT | Performed by: STUDENT IN AN ORGANIZED HEALTH CARE EDUCATION/TRAINING PROGRAM

## 2025-08-25 PROCEDURE — 1036F TOBACCO NON-USER: CPT | Performed by: STUDENT IN AN ORGANIZED HEALTH CARE EDUCATION/TRAINING PROGRAM

## 2025-08-25 PROCEDURE — RXMED WILLOW AMBULATORY MEDICATION CHARGE

## 2025-08-25 RX ORDER — ESCITALOPRAM OXALATE 10 MG/1
10 TABLET ORAL DAILY
Qty: 90 TABLET | Refills: 3 | Status: SHIPPED | OUTPATIENT
Start: 2025-08-25 | End: 2026-08-25

## 2025-08-25 ASSESSMENT — PAIN SCALES - GENERAL: PAINLEVEL_OUTOF10: 0-NO PAIN

## 2025-08-26 ENCOUNTER — ALLIED HEALTH (OUTPATIENT)
Dept: INTEGRATIVE MEDICINE | Facility: CLINIC | Age: 40
End: 2025-08-26
Payer: COMMERCIAL

## 2025-08-26 DIAGNOSIS — F41.9 ANXIETY: Primary | ICD-10-CM

## 2025-08-26 PROCEDURE — 97811 ACUP 1/> W/O ESTIM EA ADD 15: CPT | Performed by: NATUROPATH

## 2025-08-26 PROCEDURE — 97810 ACUP 1/> WO ESTIM 1ST 15 MIN: CPT | Performed by: NATUROPATH

## 2025-08-26 ASSESSMENT — ENCOUNTER SYMPTOMS
HEADACHES: 1
PALPITATIONS: 0
NAUSEA: 0
FEVER: 0
CONSTIPATION: 0
SHORTNESS OF BREATH: 0
ARTHRALGIAS: 1
WHEEZING: 0
RHINORRHEA: 0
DIFFICULTY URINATING: 0
COUGH: 0
TROUBLE SWALLOWING: 0
CHILLS: 0
VOMITING: 0

## 2025-08-27 ENCOUNTER — APPOINTMENT (OUTPATIENT)
Dept: INTEGRATIVE MEDICINE | Facility: CLINIC | Age: 40
End: 2025-08-27
Payer: COMMERCIAL

## 2025-09-04 PROCEDURE — RXMED WILLOW AMBULATORY MEDICATION CHARGE

## 2025-09-05 ENCOUNTER — PHARMACY VISIT (OUTPATIENT)
Dept: PHARMACY | Facility: CLINIC | Age: 40
End: 2025-09-05
Payer: COMMERCIAL

## 2025-09-12 ENCOUNTER — APPOINTMENT (OUTPATIENT)
Dept: INTEGRATIVE MEDICINE | Facility: CLINIC | Age: 40
End: 2025-09-12
Payer: COMMERCIAL

## 2025-09-17 ENCOUNTER — APPOINTMENT (OUTPATIENT)
Dept: INTEGRATIVE MEDICINE | Facility: CLINIC | Age: 40
End: 2025-09-17
Payer: COMMERCIAL

## 2026-02-11 ENCOUNTER — APPOINTMENT (OUTPATIENT)
Dept: INTEGRATIVE MEDICINE | Facility: CLINIC | Age: 41
End: 2026-02-11
Payer: COMMERCIAL

## 2026-06-03 ENCOUNTER — APPOINTMENT (OUTPATIENT)
Dept: RHEUMATOLOGY | Facility: CLINIC | Age: 41
End: 2026-06-03
Payer: COMMERCIAL

## 2026-08-26 ENCOUNTER — APPOINTMENT (OUTPATIENT)
Dept: PRIMARY CARE | Facility: CLINIC | Age: 41
End: 2026-08-26
Payer: COMMERCIAL

## (undated) DEVICE — CARE KIT, LAPAROSCOPIC, ADVANCED

## (undated) DEVICE — COVER, CART, 45 X 27 X 48 IN, CLEAR

## (undated) DEVICE — TUBE SET, PNEUMOCLEAR, SMOKE EVACU, HIGH-FLOW

## (undated) DEVICE — SYSTEM, FIOS FIRST ENTRY, 5 X 100MM, KII ADVANCED FIXATION

## (undated) DEVICE — GLOVE, SURGICAL, PROTEXIS,  6.0, PF, LATEX

## (undated) DEVICE — TOWEL, SURGICAL, NEURO, O/R, 16 X 26, BLUE, STERILE

## (undated) DEVICE — Device

## (undated) DEVICE — CLIPPER, SURGICAL BLADE ASSEMBLY, GENERAL PURPOSE, SINGLE USE

## (undated) DEVICE — SPONGE GAUZE, XRAY SC+RFID, 4X4 16 PLY, STERILE

## (undated) DEVICE — SUTURE, MONOCRYL PLUS 4-0, PS-2, 18IN, UNDYED

## (undated) DEVICE — MANIFOLD, 4 PORT NEPTUNE STANDARD

## (undated) DEVICE — PAD, PREP, SKIN BARRIER, WIPE, PREPPIES

## (undated) DEVICE — HOLSTER, JET SAFETY

## (undated) DEVICE — PREP TRAY, SKIN, DRY, W/GLOVES